# Patient Record
Sex: MALE | Race: WHITE | NOT HISPANIC OR LATINO | Employment: FULL TIME | URBAN - METROPOLITAN AREA
[De-identification: names, ages, dates, MRNs, and addresses within clinical notes are randomized per-mention and may not be internally consistent; named-entity substitution may affect disease eponyms.]

---

## 2017-01-20 RX ORDER — LISINOPRIL 40 MG/1
40 TABLET ORAL DAILY
Qty: 90 TABLET | Refills: 0 | Status: SHIPPED | OUTPATIENT
Start: 2017-01-20 | End: 2017-04-21 | Stop reason: SDUPTHER

## 2017-01-20 NOTE — TELEPHONE ENCOUNTER
Pharmacy called from next door saying pt need refill on Lisinopril told them MA would sent message to Giulia, pt is  Due this month for med check called pt left message no answer to call office he is due for med check.

## 2017-01-23 ENCOUNTER — OFFICE VISIT (OUTPATIENT)
Dept: FAMILY MEDICINE | Facility: CLINIC | Age: 52
End: 2017-01-23
Payer: COMMERCIAL

## 2017-01-23 VITALS
OXYGEN SATURATION: 97 % | HEART RATE: 87 BPM | WEIGHT: 229.25 LBS | BODY MASS INDEX: 31.05 KG/M2 | TEMPERATURE: 98 F | DIASTOLIC BLOOD PRESSURE: 64 MMHG | HEIGHT: 72 IN | SYSTOLIC BLOOD PRESSURE: 98 MMHG

## 2017-01-23 DIAGNOSIS — I10 ESSENTIAL HYPERTENSION: ICD-10-CM

## 2017-01-23 DIAGNOSIS — F90.0 ADHD (ATTENTION DEFICIT HYPERACTIVITY DISORDER), INATTENTIVE TYPE: Primary | ICD-10-CM

## 2017-01-23 PROCEDURE — 99213 OFFICE O/P EST LOW 20 MIN: CPT | Mod: S$GLB,,, | Performed by: NURSE PRACTITIONER

## 2017-01-23 PROCEDURE — 99999 PR PBB SHADOW E&M-EST. PATIENT-LVL IV: CPT | Mod: PBBFAC,,, | Performed by: NURSE PRACTITIONER

## 2017-01-23 RX ORDER — DEXTROAMPHETAMINE SULFATE, DEXTROAMPHETAMINE SACCHARATE, AMPHETAMINE SULFATE AND AMPHETAMINE ASPARTATE 7.5; 7.5; 7.5; 7.5 MG/1; MG/1; MG/1; MG/1
60 CAPSULE, EXTENDED RELEASE ORAL EVERY MORNING
Qty: 180 CAPSULE | Refills: 0 | Status: SHIPPED | OUTPATIENT
Start: 2017-01-23 | End: 2017-04-21 | Stop reason: SDUPTHER

## 2017-01-23 RX ORDER — TRIAMTERENE/HYDROCHLOROTHIAZID 37.5-25 MG
1 TABLET ORAL DAILY
Qty: 90 TABLET | Refills: 1 | Status: SHIPPED | OUTPATIENT
Start: 2017-01-23 | End: 2017-04-21 | Stop reason: SDUPTHER

## 2017-01-23 NOTE — PROGRESS NOTES
"Subjective:       Patient ID: Nick Mcdonough Sr. is a 51 y.o. male.    Chief Complaint: Medication Refill and Muscle Pain (both shoulders started about 2 weeks ago)    HPI Comments: Patient is here today for ADHD medication refill.  Patient is stable on current regimen.  Patient is able to focus and complete tasks effectively.  No side effects reported.    Patient has Hypertension that is controlled on present medication and actually much improved and on the lower end of normal.  BP 98/64  Pulse 87  Temp 97.6 °F (36.4 °C) (Oral)   Ht 5' 11.5" (1.816 m)  Wt 104 kg (229 lb 4.5 oz)  SpO2 97%  BMI 31.53 kg/m2  Patient states he lost weight and completely quit drinking alcohol. Lost 15 pounds in last 4 to 5 months.              Previous Medications    ADDERALL XR 30 MG 24 HR CAPSULE    Take 2 capsules (60 mg total) by mouth every morning.    AMLODIPINE (NORVASC) 10 MG TABLET    Take 1 tablet (10 mg total) by mouth once daily.    DOXAZOSIN (CARDURA) 8 MG TAB    Take 1 tablet (8 mg total) by mouth once daily.    ESOMEPRAZOLE (NEXIUM) 40 MG CAPSULE    Take 1 capsule (40 mg total) by mouth once daily.    FISH OIL-OMEGA-3 FATTY ACIDS 300-1,000 MG CAPSULE    Take 1 g by mouth once daily.    LISINOPRIL (PRINIVIL,ZESTRIL) 40 MG TABLET    Take 1 tablet (40 mg total) by mouth once daily.    NAPROXEN (NAPROSYN) 500 MG TABLET    Take 1 tablet (500 mg total) by mouth 2 (two) times daily with meals.    TRIAMTERENE-HYDROCHLOROTHIAZIDE 75-50 MG (MAXZIDE) 75-50 MG PER TABLET    Take 1 tablet by mouth once daily.       Past Medical History   Diagnosis Date    Adult ADHD     Alcohol dependency 9/2015     last alcohol intake September 2015    GERD (gastroesophageal reflux disease)     Hypertension        Past Surgical History   Procedure Laterality Date    Knee surgery Right     Vasectomy      Hernia repair      Inguinal hernia repair Left 2012     [facility] at [facility]       Family History   Problem Relation Age of Onset    " Hypertension Father     Cancer Maternal Grandmother     No Known Problems Maternal Grandfather     Heart disease Paternal Grandmother     Alcohol abuse Paternal Grandfather        Social History     Social History    Marital status:      Spouse name: N/A    Number of children: N/A    Years of education: N/A     Social History Main Topics    Smoking status: Current Every Day Smoker     Packs/day: 1.00     Years: 15.00    Smokeless tobacco: Never Used    Alcohol use No      Comment: socially    Drug use: No    Sexual activity: Not Asked     Other Topics Concern    None     Social History Narrative       Review of Systems   Constitutional: Negative for activity change, appetite change, fatigue, fever and unexpected weight change.   HENT: Negative for congestion, ear pain, mouth sores, nosebleeds, postnasal drip, rhinorrhea, sinus pressure, sneezing, sore throat, trouble swallowing and voice change.    Eyes: Negative.    Respiratory: Negative for cough, chest tightness and shortness of breath.    Cardiovascular: Negative for chest pain, palpitations and leg swelling.   Gastrointestinal: Negative.  Negative for abdominal pain, blood in stool, constipation, diarrhea, nausea and vomiting.   Endocrine: Negative.    Genitourinary: Negative for difficulty urinating, dysuria, flank pain, hematuria and urgency.   Musculoskeletal: Negative for arthralgias, back pain, gait problem, joint swelling, myalgias and neck pain.   Skin: Negative for color change, rash and wound.   Allergic/Immunologic: Negative for immunocompromised state.   Neurological: Negative for dizziness, tremors, seizures, syncope, speech difficulty and headaches.   Hematological: Negative for adenopathy. Does not bruise/bleed easily.   Psychiatric/Behavioral: Negative for behavioral problems, dysphoric mood, sleep disturbance and suicidal ideas. The patient is not nervous/anxious.          Objective:     Vitals:    01/23/17 1526 01/23/17 1550  "  BP: 100/70 98/64   BP Location: Left arm    Patient Position: Sitting    BP Method: Manual    Pulse: 87    Temp: 97.6 °F (36.4 °C)    TempSrc: Oral    SpO2: 97%    Weight: 104 kg (229 lb 4.5 oz)    Height: 5' 11.5" (1.816 m)           Physical Exam   Constitutional: He is oriented to person, place, and time. He appears well-developed and well-nourished. No distress.   + obesity with Body mass index is 31.53 kg/(m^2).       HENT:   Head: Normocephalic.   Right Ear: External ear normal.   Left Ear: External ear normal.   Nose: Nose normal.   Mouth/Throat: Oropharynx is clear and moist. No oropharyngeal exudate.   Eyes: EOM are normal. Pupils are equal, round, and reactive to light. Right eye exhibits no discharge. Left eye exhibits no discharge. No scleral icterus.   Neck: Normal range of motion. Neck supple. No JVD present. No tracheal deviation present. No thyromegaly present.   Cardiovascular: Normal rate, regular rhythm and normal heart sounds.    No murmur heard.  Pulmonary/Chest: Effort normal and breath sounds normal. No stridor. No respiratory distress.   Abdominal: Soft. He exhibits no distension and no mass. There is no guarding.   Musculoskeletal: Normal range of motion. He exhibits no edema.   Lymphadenopathy:     He has no cervical adenopathy.   Neurological: He is alert and oriented to person, place, and time. Coordination normal.   Skin: Skin is warm and dry. No rash noted. He is not diaphoretic.   Psychiatric: He has a normal mood and affect. His behavior is normal.         Assessment:         ICD-10-CM ICD-9-CM   1. ADHD (attention deficit hyperactivity disorder), inattentive type F90.0 314.00   2. Essential hypertension I10 401.9       Plan:       ADHD (attention deficit hyperactivity disorder), inattentive type  -  Controlled on present medication.  Follow up in 6 months.  -     ADDERALL XR 30 mg 24 hr capsule; Take 2 capsules (60 mg total) by mouth every morning.  Dispense: 180 capsule; Refill: " 0    Essential hypertension  -  Well controlled and actually on the lower end of normal - will cut back Maxzide 50 mg to Maxzide 25 mg.  Continue the Lisinopril and Amlodipine at present doses.  If Blood pressure stable, follow up in 3 months.  If blood pressure running low at home - return to office to recheck.  -     triamterene-hydrochlorothiazide 37.5-25 mg (MAXZIDE-25) 37.5-25 mg per tablet; Take 1 tablet by mouth once daily.  Dispense: 90 tablet; Refill: 1    Return in about 3 months (around 4/23/2017).     Patient's Medications   New Prescriptions    TRIAMTERENE-HYDROCHLOROTHIAZIDE 37.5-25 MG (MAXZIDE-25) 37.5-25 MG PER TABLET    Take 1 tablet by mouth once daily.   Previous Medications    AMLODIPINE (NORVASC) 10 MG TABLET    Take 1 tablet (10 mg total) by mouth once daily.    DOXAZOSIN (CARDURA) 8 MG TAB    Take 1 tablet (8 mg total) by mouth once daily.    ESOMEPRAZOLE (NEXIUM) 40 MG CAPSULE    Take 1 capsule (40 mg total) by mouth once daily.    FISH OIL-OMEGA-3 FATTY ACIDS 300-1,000 MG CAPSULE    Take 1 g by mouth once daily.    LISINOPRIL (PRINIVIL,ZESTRIL) 40 MG TABLET    Take 1 tablet (40 mg total) by mouth once daily.    NAPROXEN (NAPROSYN) 500 MG TABLET    Take 1 tablet (500 mg total) by mouth 2 (two) times daily with meals.   Modified Medications    Modified Medication Previous Medication    ADDERALL XR 30 MG 24 HR CAPSULE ADDERALL XR 30 mg 24 hr capsule       Take 2 capsules (60 mg total) by mouth every morning.    Take 2 capsules (60 mg total) by mouth every morning.   Discontinued Medications    TRIAMTERENE-HYDROCHLOROTHIAZIDE 75-50 MG (MAXZIDE) 75-50 MG PER TABLET    Take 1 tablet by mouth once daily.

## 2017-01-23 NOTE — MR AVS SNAPSHOT
[address] [place] [address]  Phone: [phone]  Fax: [phone]                  Nick Mcdonough [place]   2017 3:20 PM   Office Visit    Description:  Male : 1965   Provider:  Giulia Solis NP   Department:  [facility]           Reason for Visit     Medication Refill     Muscle Pain           Diagnoses this Visit        Comments    ADHD (attention deficit hyperactivity disorder), inattentive type    -  Primary     Essential hypertension                To Do List           Goals (5 Years of Data)     None      Follow-Up and Disposition     Return in about 3 months (around 2017).       These Medications        Disp Refills Start End    triamterene-hydrochlorothiazide 37.5-25 mg (MAXZIDE-25) 37.5-25 mg per tablet 90 tablet 1 2017    Take 1 tablet by mouth once daily. - Oral    Pharmacy: [facility] [place] [address] Ph #: [phone]       ADDERALL XR 30 mg 24 hr capsule 180 capsule 0 2017     Take 2 capsules (60 mg total) by mouth every morning. - Oral    Pharmacy: [facility]  [place] [address] Ph #: [phone]         [facility]     [facility] Nurse Care Line -  Assistance  Registered nurses in the [facility] provide clinical advisement, health education, appointment booking, and other advisory services.  Call for this free service at [phone].             Medications           Message regarding Medications     Verify the changes and/or additions to your medication regime listed below are the same as discussed with your clinician today.  If any of these changes or additions are incorrect, please notify your healthcare provider.        START taking these NEW medications        Refills    triamterene-hydrochlorothiazide 37.5-25 mg (MAXZIDE-25) 37.5-25 mg per tablet 1    Sig: Take 1  "tablet by mouth once daily.    Class: Normal    Route: Oral      STOP taking these medications     triamterene-hydrochlorothiazide 75-50 mg (MAXZIDE) 75-50 mg per tablet Take 1 tablet by mouth once daily.           Verify that the below list of medications is an accurate representation of the medications you are currently taking.  If none reported, the list may be blank. If incorrect, please contact your healthcare provider. Carry this list with you in case of emergency.           Current Medications     ADDERALL XR 30 mg 24 hr capsule Take 2 capsules (60 mg total) by mouth every morning.    amlodipine (NORVASC) 10 MG tablet Take 1 tablet (10 mg total) by mouth once daily.    doxazosin (CARDURA) 8 MG Tab Take 1 tablet (8 mg total) by mouth once daily.    esomeprazole (NEXIUM) 40 MG capsule Take 1 capsule (40 mg total) by mouth once daily.    fish oil-omega-3 fatty acids 300-1,000 mg capsule Take 1 g by mouth once daily.    lisinopril (PRINIVIL,ZESTRIL) 40 MG tablet Take 1 tablet (40 mg total) by mouth once daily.    naproxen (NAPROSYN) 500 MG tablet Take 1 tablet (500 mg total) by mouth 2 (two) times daily with meals.    triamterene-hydrochlorothiazide 37.5-25 mg (MAXZIDE-25) 37.5-25 mg per tablet Take 1 tablet by mouth once daily.           Clinical Reference Information           Vital Signs - Last Recorded  Most recent update: 1/23/2017  3:50 PM by Giulia Solis NP    BP Pulse Temp Ht Wt SpO2    98/64 87 97.6 °F (36.4 °C) (Oral) 5' 11.5" (1.816 m) 104 kg (229 lb 4.5 oz) 97%    BMI                31.53 kg/m2          Blood Pressure          Most Recent Value    BP  98/64      Allergies as of 1/23/2017     Pcn [Penicillins]      Immunizations Administered on Date of Encounter - 1/23/2017     None      Smoking Cessation     If you would like to quit smoking:   You may be eligible for free services if you are a [place] resident and started smoking cigarettes before September 1, 1988.  Call the Smoking Cessation " Trust ([facility]) toll free at ([phone] or [phone] [phone].   Call 5-800-QUIT-NOW if you do not meet the above criteria.

## 2017-01-30 ENCOUNTER — TELEPHONE (OUTPATIENT)
Dept: FAMILY MEDICINE | Facility: CLINIC | Age: 52
End: 2017-01-30

## 2017-01-30 NOTE — TELEPHONE ENCOUNTER
Spoke with pt inform him of paper work that Giulia had to fill out and MARVIN Erickson faxed it back and as soon as we hear something i will let him know, pt understood.

## 2017-01-30 NOTE — TELEPHONE ENCOUNTER
----- Message from Tiff Hurd sent at 1/30/2017  1:02 PM CST -----  Contact: self/[phone]  Patient is waiting on prior authorization for ADDERALL XR 30 mg 24 hr capsule.  Patient says he is all of the medication. Please advise

## 2017-02-01 ENCOUNTER — TELEPHONE (OUTPATIENT)
Dept: FAMILY MEDICINE | Facility: CLINIC | Age: 52
End: 2017-02-01

## 2017-04-21 ENCOUNTER — OFFICE VISIT (OUTPATIENT)
Dept: FAMILY MEDICINE | Facility: CLINIC | Age: 52
End: 2017-04-21
Payer: COMMERCIAL

## 2017-04-21 ENCOUNTER — TELEPHONE (OUTPATIENT)
Dept: FAMILY MEDICINE | Facility: CLINIC | Age: 52
End: 2017-04-21

## 2017-04-21 VITALS
BODY MASS INDEX: 35.53 KG/M2 | TEMPERATURE: 98 F | SYSTOLIC BLOOD PRESSURE: 130 MMHG | HEART RATE: 90 BPM | OXYGEN SATURATION: 97 % | DIASTOLIC BLOOD PRESSURE: 78 MMHG | HEIGHT: 69 IN | WEIGHT: 239.88 LBS

## 2017-04-21 DIAGNOSIS — F90.0 ADHD (ATTENTION DEFICIT HYPERACTIVITY DISORDER), INATTENTIVE TYPE: Primary | ICD-10-CM

## 2017-04-21 DIAGNOSIS — K21.9 GASTROESOPHAGEAL REFLUX DISEASE, ESOPHAGITIS PRESENCE NOT SPECIFIED: ICD-10-CM

## 2017-04-21 DIAGNOSIS — I10 ESSENTIAL HYPERTENSION: ICD-10-CM

## 2017-04-21 LAB
AMPHET+METHAMPHET UR QL: NORMAL
BARBITURATES UR QL SCN>200 NG/ML: NEGATIVE
BENZODIAZ UR QL SCN>200 NG/ML: NEGATIVE
BZE UR QL SCN: NEGATIVE
CANNABINOIDS UR QL SCN: NEGATIVE
CREAT UR-MCNC: 146 MG/DL
ETHANOL UR-MCNC: <10 MG/DL
METHADONE UR QL SCN>300 NG/ML: NEGATIVE
OPIATES UR QL SCN: NEGATIVE
PCP UR QL SCN>25 NG/ML: NEGATIVE
TOXICOLOGY INFORMATION: NORMAL

## 2017-04-21 PROCEDURE — 80307 DRUG TEST PRSMV CHEM ANLYZR: CPT

## 2017-04-21 PROCEDURE — 99999 PR PBB SHADOW E&M-EST. PATIENT-LVL III: CPT | Mod: PBBFAC,,, | Performed by: NURSE PRACTITIONER

## 2017-04-21 PROCEDURE — 99214 OFFICE O/P EST MOD 30 MIN: CPT | Mod: S$GLB,,, | Performed by: NURSE PRACTITIONER

## 2017-04-21 PROCEDURE — 1160F RVW MEDS BY RX/DR IN RCRD: CPT | Mod: S$GLB,,, | Performed by: NURSE PRACTITIONER

## 2017-04-21 PROCEDURE — 3078F DIAST BP <80 MM HG: CPT | Mod: S$GLB,,, | Performed by: NURSE PRACTITIONER

## 2017-04-21 PROCEDURE — 3075F SYST BP GE 130 - 139MM HG: CPT | Mod: S$GLB,,, | Performed by: NURSE PRACTITIONER

## 2017-04-21 RX ORDER — AMLODIPINE BESYLATE 10 MG/1
10 TABLET ORAL DAILY
Qty: 90 TABLET | Refills: 1 | Status: SHIPPED | OUTPATIENT
Start: 2017-04-21 | End: 2017-11-17 | Stop reason: SDUPTHER

## 2017-04-21 RX ORDER — DEXTROAMPHETAMINE SULFATE, DEXTROAMPHETAMINE SACCHARATE, AMPHETAMINE SULFATE AND AMPHETAMINE ASPARTATE 7.5; 7.5; 7.5; 7.5 MG/1; MG/1; MG/1; MG/1
60 CAPSULE, EXTENDED RELEASE ORAL EVERY MORNING
Qty: 180 CAPSULE | Refills: 0 | Status: SHIPPED | OUTPATIENT
Start: 2017-04-21 | End: 2017-06-16 | Stop reason: SDUPTHER

## 2017-04-21 RX ORDER — DOXAZOSIN 8 MG/1
8 TABLET ORAL DAILY
Qty: 90 TABLET | Refills: 1 | Status: SHIPPED | OUTPATIENT
Start: 2017-04-21 | End: 2018-05-30 | Stop reason: SDUPTHER

## 2017-04-21 RX ORDER — ESOMEPRAZOLE MAGNESIUM 40 MG/1
40 CAPSULE, DELAYED RELEASE ORAL DAILY
Qty: 90 CAPSULE | Refills: 1 | Status: SHIPPED | OUTPATIENT
Start: 2017-04-21 | End: 2018-02-12 | Stop reason: SDUPTHER

## 2017-04-21 RX ORDER — LISINOPRIL 40 MG/1
40 TABLET ORAL DAILY
Qty: 90 TABLET | Refills: 1 | Status: SHIPPED | OUTPATIENT
Start: 2017-04-21 | End: 2017-11-17 | Stop reason: SDUPTHER

## 2017-04-21 RX ORDER — TRIAMTERENE/HYDROCHLOROTHIAZID 37.5-25 MG
1 TABLET ORAL DAILY
Qty: 90 TABLET | Refills: 1 | Status: SHIPPED | OUTPATIENT
Start: 2017-04-21 | End: 2017-09-26 | Stop reason: SDUPTHER

## 2017-04-21 NOTE — MR AVS SNAPSHOT
[facility]  [address] [place] [address]  Phone: [phone]  Fax: [phone]                  Nick Mcdonough    2017 4:00 PM   Office Visit    Description:  Male : 1965   Provider:  Giulia Solis NP   Department:  [facility]           Reason for Visit     Medication Refill           Diagnoses this Visit        Comments    ADHD (attention deficit hyperactivity disorder), inattentive type    -  Primary     Gastroesophageal reflux disease, esophagitis presence not specified         Essential hypertension                To Do List           Goals (5 Years of Data)     None      Follow-Up and Disposition     Return in about 3 months (around 2017).       These Medications        Disp Refills Start End    ADDERALL XR 30 mg 24 hr capsule 180 capsule 0 2017     Take 2 capsules (60 mg total) by mouth every morning. - Oral    Pharmacy: [facility] [place] [address] AT [facility] [address] Ph #: [phone]       amlodipine (NORVASC) 10 MG tablet 90 tablet 1 2017     Take 1 tablet (10 mg total) by mouth once daily. - Oral    Pharmacy: [facility] [place] [address] AT [facility] [address] Ph #: [phone]       doxazosin (CARDURA) 8 MG Tab 90 tablet 1 2017     Take 1 tablet (8 mg total) by mouth once daily. - Oral    Pharmacy: [facility] [place] [address] AT [facility] [address] Ph #: [phone]       esomeprazole (NEXIUM) 40 MG capsule 90 capsule 1 2017     Take 1 capsule (40 mg total) by mouth once daily. - Oral    Pharmacy: [facility] [place] [address] AT [facility] [address] Ph #: [phone]       lisinopril (PRINIVIL,ZESTRIL) 40 MG tablet 90 tablet 1 2017     Take 1 tablet (40 mg total) by mouth once daily. - Oral    Pharmacy: [facility]  [id number]  [place], [place] - [address] AT [facility] [address] Ph #: [phone]       triamterene-hydrochlorothiazide 37.5-25 mg (MAXZIDE-25) 37.5-25 mg per tablet 90 tablet 1 4/21/2017 4/21/2018    Take 1 tablet by mouth once daily. - Oral    [facility] [id number]  [place], [place] [facility] [address] [facility] [address] [facility] [address] Ph #: [phone]         [facility]     [facility] On Call Nurse Care Line - 24/7 Assistance  Unless otherwise directed by your provider, please contact [facility], our nurse care line that is available for 24/7 assistance.     Registered nurses in the [facility] provide: appointment scheduling, clinical advisement, health education, and other advisory services.  Call: [phone] (toll free)               Medications           Message regarding Medications     Verify the changes and/or additions to your medication regime listed below are the same as discussed with your clinician today.  If any of these changes or additions are incorrect, please notify your healthcare provider.             Verify that the below list of medications is an accurate representation of the medications you are currently taking.  If none reported, the list may be blank. If incorrect, please contact your healthcare provider. Carry this list with you in case of emergency.           Current Medications     ADDERALL XR 30 mg 24 hr capsule Take 2 capsules (60 mg total) by mouth every morning.    amlodipine (NORVASC) 10 MG tablet Take 1 tablet (10 mg total) by mouth once daily.    doxazosin (CARDURA) 8 MG Tab Take 1 tablet (8 mg total) by mouth once daily.    esomeprazole (NEXIUM) 40 MG capsule Take 1 capsule (40 mg total) by mouth once daily.    fish oil-omega-3 fatty acids 300-1,000 mg capsule Take 1 g by mouth once daily.    lisinopril (PRINIVIL,ZESTRIL) 40 MG tablet Take 1 tablet (40 mg total) by mouth once daily.    naproxen (NAPROSYN) 500 MG tablet Take 1 tablet  "(500 mg total) by mouth 2 (two) times daily with meals.    triamterene-hydrochlorothiazide 37.5-25 mg (MAXZIDE-25) 37.5-25 mg per tablet Take 1 tablet by mouth once daily.           Clinical Reference Information           Your Vitals Were     BP Pulse Temp Height    130/78 (BP Location: Right arm, Patient Position: Sitting, BP Method: Manual) 90 98.3 °F (36.8 °C) (Oral) 5' 9.25" (1.759 m)    Weight SpO2 BMI    108.8 kg (239 lb 13.8 oz) 97% 35.17 kg/m2      Blood Pressure          Most Recent Value    BP  130/78      Allergies as of 4/21/2017     Pcn [Penicillins]      Immunizations Administered on Date of Encounter - 4/21/2017     None      Smoking Cessation     If you would like to quit smoking:   You may be eligible for free services if you are a [place] resident and started smoking cigarettes before September 1, 1988.  Call the Smoking Cessation Trust ([facility]) toll free at ([phone] [phone] or [phone] [phone].   Call 1-800-QUIT-NOW if you do not meet the above criteria.   Contact us via email: [email or web address]   View our website for more information: [email or web address]        Language Assistance Services     ATTENTION: Language assistance services are available, free of charge. Please call [phone].      ATENCIÓN: Si habla español, tiene a mclean disposición servicios gratuitos de asistencia lingüística. Llame al [phone].     CHÚ Ý: N?u b?n nói Ti?ng Vi?t, có các d?ch v? h? tr? ngôn ng? mi?n phí dành cho b?n. G?i s? [phone].         [facility] complies with applicable Federal civil rights laws and does not discriminate on the basis of race, color, national origin, age, disability, or sex.        "

## 2017-04-21 NOTE — PROGRESS NOTES
"Subjective:       Patient ID: Nick Mcdonough Sr. is a 51 y.o. male.    Chief Complaint: Medication Refill    HPI Comments: Patient is here today for 3 month follow up.    Patient is here today for ADHD medication refill. Patient is stable on current regimen. Patient is able to focus and complete tasks effectively. No side effects reported.    Patient has Hypertension that is controlled on current medications.  /78 (BP Location: Right arm, Patient Position: Sitting, BP Method: Manual)  Pulse 90  Temp 98.3 °F (36.8 °C) (Oral)   Ht 5' 9.25" (1.759 m)  Wt 108.8 kg (239 lb 13.8 oz)  SpO2 97%  BMI 35.17 kg/m2        Previous Medications    ADDERALL XR 30 MG 24 HR CAPSULE    Take 2 capsules (60 mg total) by mouth every morning.    AMLODIPINE (NORVASC) 10 MG TABLET    Take 1 tablet (10 mg total) by mouth once daily.    DOXAZOSIN (CARDURA) 8 MG TAB    Take 1 tablet (8 mg total) by mouth once daily.    ESOMEPRAZOLE (NEXIUM) 40 MG CAPSULE    Take 1 capsule (40 mg total) by mouth once daily.    FISH OIL-OMEGA-3 FATTY ACIDS 300-1,000 MG CAPSULE    Take 1 g by mouth once daily.    LISINOPRIL (PRINIVIL,ZESTRIL) 40 MG TABLET    Take 1 tablet (40 mg total) by mouth once daily.    NAPROXEN (NAPROSYN) 500 MG TABLET    Take 1 tablet (500 mg total) by mouth 2 (two) times daily with meals.    TRIAMTERENE-HYDROCHLOROTHIAZIDE 37.5-25 MG (MAXZIDE-25) 37.5-25 MG PER TABLET    Take 1 tablet by mouth once daily.       Past Medical History:   Diagnosis Date    Adult ADHD     Alcohol dependency 9/2015    last alcohol intake September 2015    GERD (gastroesophageal reflux disease)     Hypertension        Past Surgical History:   Procedure Laterality Date    HERNIA REPAIR      INGUINAL HERNIA REPAIR Left 2012    [facility] at [facility]    KNEE SURGERY Right     VASECTOMY         Family History   Problem Relation Age of Onset    Hypertension Father     Cancer Maternal Grandmother     No Known Problems Maternal Grandfather     " Heart disease Paternal Grandmother     Alcohol abuse Paternal Grandfather        Social History     Social History    Marital status:      Spouse name: N/A    Number of children: N/A    Years of education: N/A     Social History Main Topics    Smoking status: Current Every Day Smoker     Packs/day: 1.00     Years: 15.00    Smokeless tobacco: Never Used    Alcohol use No      Comment: socially    Drug use: No    Sexual activity: Not Asked     Other Topics Concern    None     Social History Narrative       Review of Systems   Constitutional: Negative for activity change, appetite change, fatigue, fever and unexpected weight change.   HENT: Negative for congestion, ear pain, mouth sores, nosebleeds, postnasal drip, rhinorrhea, sinus pressure, sneezing, sore throat, trouble swallowing and voice change.    Eyes: Negative.    Respiratory: Negative for cough, chest tightness and shortness of breath.    Cardiovascular: Negative for chest pain, palpitations and leg swelling.   Gastrointestinal: Negative.  Negative for abdominal pain, blood in stool, constipation, diarrhea, nausea and vomiting.   Endocrine: Negative.    Genitourinary: Negative for difficulty urinating, dysuria, flank pain, hematuria and urgency.   Musculoskeletal: Negative for arthralgias, back pain, gait problem, joint swelling, myalgias and neck pain.   Skin: Negative for color change, rash and wound.   Allergic/Immunologic: Negative for immunocompromised state.   Neurological: Negative for dizziness, tremors, seizures, syncope, speech difficulty and headaches.   Hematological: Negative for adenopathy. Does not bruise/bleed easily.   Psychiatric/Behavioral: Negative for behavioral problems, dysphoric mood, sleep disturbance and suicidal ideas. The patient is not nervous/anxious.          Objective:     Vitals:    04/21/17 1541   BP: 130/78   BP Location: Right arm   Patient Position: Sitting   BP Method: Manual   Pulse: 90   Temp: 98.3 °F  "(36.8 °C)   TempSrc: Oral   SpO2: 97%   Weight: 108.8 kg (239 lb 13.8 oz)   Height: 5' 9.25" (1.759 m)          Physical Exam   Constitutional: He is oriented to person, place, and time. He appears well-developed and well-nourished. No distress.   + obesity with Body mass index is 35.17 kg/(m^2).         HENT:   Head: Normocephalic.   Right Ear: External ear normal.   Left Ear: External ear normal.   Nose: Nose normal.   Mouth/Throat: Oropharynx is clear and moist. No oropharyngeal exudate.   Eyes: EOM are normal. Pupils are equal, round, and reactive to light. Right eye exhibits no discharge. Left eye exhibits no discharge. No scleral icterus.   Neck: Normal range of motion. Neck supple. No JVD present. No tracheal deviation present. No thyromegaly present.   Cardiovascular: Normal rate, regular rhythm and normal heart sounds.    No murmur heard.  Pulmonary/Chest: Effort normal and breath sounds normal. No stridor. No respiratory distress.   Abdominal: Soft. He exhibits no distension and no mass. There is no guarding.   Musculoskeletal: Normal range of motion. He exhibits no edema.   Lymphadenopathy:     He has no cervical adenopathy.   Neurological: He is alert and oriented to person, place, and time. Coordination normal.   Skin: Skin is warm and dry. No rash noted. He is not diaphoretic.   Psychiatric: He has a normal mood and affect. His behavior is normal.         Assessment:         ICD-10-CM ICD-9-CM   1. ADHD (attention deficit hyperactivity disorder), inattentive type F90.0 314.00   2. Gastroesophageal reflux disease, esophagitis presence not specified K21.9 530.81   3. Essential hypertension I10 401.9       Plan:       ADHD (attention deficit hyperactivity disorder), inattentive type  -   Controlled on present medications.  -  Random drug screen done today  -     ADDERALL XR 30 mg 24 hr capsule; Take 2 capsules (60 mg total) by mouth every morning.  Dispense: 180 capsule; Refill: 0    Gastroesophageal " reflux disease, esophagitis presence not specified  - Controlled on present medication  -     esomeprazole (NEXIUM) 40 MG capsule; Take 1 capsule (40 mg total) by mouth once daily.  Dispense: 90 capsule; Refill: 1    Essential hypertension  -  Controlled on present medication -follow up in October for wellness  -     triamterene-hydrochlorothiazide 37.5-25 mg (MAXZIDE-25) 37.5-25 mg per tablet; Take 1 tablet by mouth once daily.  Dispense: 90 tablet; Refill: 1    Other orders  -     amlodipine (NORVASC) 10 MG tablet; Take 1 tablet (10 mg total) by mouth once daily.  Dispense: 90 tablet; Refill: 1  -     doxazosin (CARDURA) 8 MG Tab; Take 1 tablet (8 mg total) by mouth once daily.  Dispense: 90 tablet; Refill: 1  -     lisinopril (PRINIVIL,ZESTRIL) 40 MG tablet; Take 1 tablet (40 mg total) by mouth once daily.  Dispense: 90 tablet; Refill: 1    Return in about 3 months (around 7/21/2017). for ADHD check    Patient's Medications   New Prescriptions    No medications on file   Previous Medications    FISH OIL-OMEGA-3 FATTY ACIDS 300-1,000 MG CAPSULE    Take 1 g by mouth once daily.    NAPROXEN (NAPROSYN) 500 MG TABLET    Take 1 tablet (500 mg total) by mouth 2 (two) times daily with meals.   Modified Medications    Modified Medication Previous Medication    ADDERALL XR 30 MG 24 HR CAPSULE ADDERALL XR 30 mg 24 hr capsule       Take 2 capsules (60 mg total) by mouth every morning.    Take 2 capsules (60 mg total) by mouth every morning.    AMLODIPINE (NORVASC) 10 MG TABLET amlodipine (NORVASC) 10 MG tablet       Take 1 tablet (10 mg total) by mouth once daily.    Take 1 tablet (10 mg total) by mouth once daily.    DOXAZOSIN (CARDURA) 8 MG TAB doxazosin (CARDURA) 8 MG Tab       Take 1 tablet (8 mg total) by mouth once daily.    Take 1 tablet (8 mg total) by mouth once daily.    ESOMEPRAZOLE (NEXIUM) 40 MG CAPSULE esomeprazole (NEXIUM) 40 MG capsule       Take 1 capsule (40 mg total) by mouth once daily.    Take 1 capsule  (40 mg total) by mouth once daily.    LISINOPRIL (PRINIVIL,ZESTRIL) 40 MG TABLET lisinopril (PRINIVIL,ZESTRIL) 40 MG tablet       Take 1 tablet (40 mg total) by mouth once daily.    Take 1 tablet (40 mg total) by mouth once daily.    TRIAMTERENE-HYDROCHLOROTHIAZIDE 37.5-25 MG (MAXZIDE-25) 37.5-25 MG PER TABLET triamterene-hydrochlorothiazide 37.5-25 mg (MAXZIDE-25) 37.5-25 mg per tablet       Take 1 tablet by mouth once daily.    Take 1 tablet by mouth once daily.   Discontinued Medications    No medications on file

## 2017-04-21 NOTE — TELEPHONE ENCOUNTER
----- Message from Carmen Reynolds sent at 4/21/2017 11:45 AM CDT -----  Contact: Self [phone]  Patient Returning Your Phone Call

## 2017-06-16 ENCOUNTER — OFFICE VISIT (OUTPATIENT)
Dept: FAMILY MEDICINE | Facility: CLINIC | Age: 52
End: 2017-06-16
Payer: COMMERCIAL

## 2017-06-16 VITALS
HEART RATE: 85 BPM | HEIGHT: 69 IN | DIASTOLIC BLOOD PRESSURE: 80 MMHG | BODY MASS INDEX: 35 KG/M2 | OXYGEN SATURATION: 97 % | TEMPERATURE: 98 F | WEIGHT: 236.31 LBS | SYSTOLIC BLOOD PRESSURE: 120 MMHG

## 2017-06-16 DIAGNOSIS — Z12.11 COLON CANCER SCREENING: ICD-10-CM

## 2017-06-16 DIAGNOSIS — F90.0 ADHD (ATTENTION DEFICIT HYPERACTIVITY DISORDER), INATTENTIVE TYPE: Primary | ICD-10-CM

## 2017-06-16 PROCEDURE — 99213 OFFICE O/P EST LOW 20 MIN: CPT | Mod: S$GLB,,, | Performed by: NURSE PRACTITIONER

## 2017-06-16 PROCEDURE — 99999 PR PBB SHADOW E&M-EST. PATIENT-LVL IV: CPT | Mod: PBBFAC,,, | Performed by: NURSE PRACTITIONER

## 2017-06-16 RX ORDER — DEXTROAMPHETAMINE SULFATE, DEXTROAMPHETAMINE SACCHARATE, AMPHETAMINE SULFATE AND AMPHETAMINE ASPARTATE 7.5; 7.5; 7.5; 7.5 MG/1; MG/1; MG/1; MG/1
60 CAPSULE, EXTENDED RELEASE ORAL EVERY MORNING
Qty: 180 CAPSULE | Refills: 0 | Status: SHIPPED | OUTPATIENT
Start: 2017-06-16 | End: 2017-09-22 | Stop reason: SDUPTHER

## 2017-06-16 NOTE — PROGRESS NOTES
Subjective:       Patient ID: Nick Mcdonough Sr. is a 51 y.o. male.    Chief Complaint: Medication Refill    Patient is here today with report he is here today for ADHD medication refill.    Patient currently takes Adderall XR 30 mg 2 capsules daily - I prescribe #180 - 3 month supply at once.  Patient reports no pharmacy around him carried that may Adderall XR so the pharmacy filled what they had available - quantity of 120 tablets - 2 month supply.   Works Monday - Friday 6:30 AM to 3 PM.  Works as a .                    Previous Medications    ADDERALL XR 30 MG 24 HR CAPSULE    Take 2 capsules (60 mg total) by mouth every morning.    AMLODIPINE (NORVASC) 10 MG TABLET    Take 1 tablet (10 mg total) by mouth once daily.    DOXAZOSIN (CARDURA) 8 MG TAB    Take 1 tablet (8 mg total) by mouth once daily.    ESOMEPRAZOLE (NEXIUM) 40 MG CAPSULE    Take 1 capsule (40 mg total) by mouth once daily.    FISH OIL-OMEGA-3 FATTY ACIDS 300-1,000 MG CAPSULE    Take 1 g by mouth once daily.    LISINOPRIL (PRINIVIL,ZESTRIL) 40 MG TABLET    Take 1 tablet (40 mg total) by mouth once daily.    NAPROXEN (NAPROSYN) 500 MG TABLET    Take 1 tablet (500 mg total) by mouth 2 (two) times daily with meals.    TRIAMTERENE-HYDROCHLOROTHIAZIDE 37.5-25 MG (MAXZIDE-25) 37.5-25 MG PER TABLET    Take 1 tablet by mouth once daily.       Past Medical History:   Diagnosis Date    Adult ADHD     Alcohol dependency 9/2015    last alcohol intake September 2015    GERD (gastroesophageal reflux disease)     Hypertension        Past Surgical History:   Procedure Laterality Date    HERNIA REPAIR      INGUINAL HERNIA REPAIR Left 2012    done at [facility]    KNEE SURGERY Right     VASECTOMY         Family History   Problem Relation Age of Onset    Hypertension Father     Cancer Maternal Grandmother     No Known Problems Maternal Grandfather     Heart disease Paternal Grandmother     Alcohol abuse Paternal Grandfather         Social History     Social History    Marital status:      Spouse name: N/A    Number of children: N/A    Years of education: N/A     Social History Main Topics    Smoking status: Current Every Day Smoker     Packs/day: 1.00     Years: 15.00    Smokeless tobacco: Never Used    Alcohol use No      Comment: socially    Drug use: No    Sexual activity: Not Asked     Other Topics Concern    None     Social History Narrative    None       Review of Systems   Constitutional: Negative for activity change, appetite change, fatigue, fever and unexpected weight change.   HENT: Negative for congestion, ear pain, mouth sores, nosebleeds, postnasal drip, rhinorrhea, sinus pressure, sneezing, sore throat, trouble swallowing and voice change.    Eyes: Negative.  Negative for discharge.   Respiratory: Negative for cough, chest tightness, shortness of breath and wheezing.    Cardiovascular: Negative for chest pain, palpitations and leg swelling.   Gastrointestinal: Negative.  Negative for abdominal pain, blood in stool, constipation, diarrhea, nausea and vomiting.   Endocrine: Negative.  Negative for polydipsia and polyuria.   Genitourinary: Negative for difficulty urinating, dysuria, flank pain, hematuria and urgency.   Musculoskeletal: Negative for arthralgias, back pain, gait problem, joint swelling, myalgias and neck pain.   Skin: Negative for color change, rash and wound.   Allergic/Immunologic: Negative for immunocompromised state.   Neurological: Negative for dizziness, tremors, seizures, syncope, speech difficulty, weakness and headaches.   Hematological: Negative for adenopathy. Does not bruise/bleed easily.   Psychiatric/Behavioral: Negative for behavioral problems, confusion, dysphoric mood, sleep disturbance and suicidal ideas. The patient is not nervous/anxious.          Objective:     Vitals:    06/16/17 1340   BP: 120/80   BP Location: Right arm   Patient Position: Sitting   BP Method: Manual  "  Pulse: 85   Temp: 97.8 °F (36.6 °C)   TempSrc: Oral   SpO2: 97%   Weight: 107.2 kg (236 lb 5.3 oz)   Height: 5' 9.25" (1.759 m)          Physical Exam   Constitutional: He is oriented to person, place, and time. He appears well-developed and well-nourished. No distress.   + obesity with Body mass index is 34.65 kg/m².   HENT:   Head: Normocephalic.   Right Ear: External ear normal.   Left Ear: External ear normal.   Nose: Nose normal.   Mouth/Throat: Oropharynx is clear and moist. No oropharyngeal exudate.   Eyes: EOM are normal. Pupils are equal, round, and reactive to light. Right eye exhibits no discharge. Left eye exhibits no discharge. No scleral icterus.   Neck: Normal range of motion. Neck supple. No JVD present. No tracheal deviation present. No thyromegaly present.   Cardiovascular: Normal rate, regular rhythm and normal heart sounds.    No murmur heard.  Pulmonary/Chest: Effort normal and breath sounds normal. No stridor. No respiratory distress.   Abdominal: Soft. He exhibits no distension and no mass. There is no guarding.   Musculoskeletal: Normal range of motion. He exhibits no edema.   Lymphadenopathy:     He has no cervical adenopathy.   Neurological: He is alert and oriented to person, place, and time. Coordination normal.   Skin: Skin is warm and dry. No rash noted. He is not diaphoretic.   Psychiatric: He has a normal mood and affect. His behavior is normal.         Assessment:         ICD-10-CM ICD-9-CM   1. ADHD (attention deficit hyperactivity disorder), inattentive type F90.0 314.00   2. Colon cancer screening Z12.11 V76.51       Plan:       ADHD (attention deficit hyperactivity disorder), inattentive type  -  Controlled on present medication - follow up in 3 months.  -     ADDERALL XR 30 mg 24 hr capsule; Take 2 capsules (60 mg total) by mouth every morning.  Dispense: 180 capsule; Refill: 0    Colon cancer screening  -     Case request GI: COLONOSCOPY      Return in about 3 months (around " 9/16/2017).     Patient's Medications   New Prescriptions    No medications on file   Previous Medications    AMLODIPINE (NORVASC) 10 MG TABLET    Take 1 tablet (10 mg total) by mouth once daily.    DOXAZOSIN (CARDURA) 8 MG TAB    Take 1 tablet (8 mg total) by mouth once daily.    ESOMEPRAZOLE (NEXIUM) 40 MG CAPSULE    Take 1 capsule (40 mg total) by mouth once daily.    FISH OIL-OMEGA-3 FATTY ACIDS 300-1,000 MG CAPSULE    Take 1 g by mouth once daily.    LISINOPRIL (PRINIVIL,ZESTRIL) 40 MG TABLET    Take 1 tablet (40 mg total) by mouth once daily.    TRIAMTERENE-HYDROCHLOROTHIAZIDE 37.5-25 MG (MAXZIDE-25) 37.5-25 MG PER TABLET    Take 1 tablet by mouth once daily.   Modified Medications    Modified Medication Previous Medication    ADDERALL XR 30 MG 24 HR CAPSULE ADDERALL XR 30 mg 24 hr capsule       Take 2 capsules (60 mg total) by mouth every morning.    Take 2 capsules (60 mg total) by mouth every morning.   Discontinued Medications    NAPROXEN (NAPROSYN) 500 MG TABLET    Take 1 tablet (500 mg total) by mouth 2 (two) times daily with meals.

## 2017-06-22 ENCOUNTER — TELEPHONE (OUTPATIENT)
Dept: GASTROENTEROLOGY | Facility: CLINIC | Age: 52
End: 2017-06-22

## 2017-06-22 NOTE — TELEPHONE ENCOUNTER
Referral was sent from Dr. Solis to schedule patient for an Colonoscopy Screening. Patient stated that he would like to call back at a later time in regards to getting scheduled due to other health problems.

## 2017-07-30 PROBLEM — E87.6 HYPOKALEMIA: Status: RESOLVED | Noted: 2017-07-30 | Resolved: 2017-07-30

## 2017-07-30 PROBLEM — Z72.0 TOBACCO ABUSE: Status: ACTIVE | Noted: 2017-07-30

## 2017-07-30 PROBLEM — Z77.098 CHLORINE GAS EXPOSURE: Status: ACTIVE | Noted: 2017-07-30

## 2017-07-30 PROBLEM — E87.6 HYPOKALEMIA: Status: ACTIVE | Noted: 2017-07-30

## 2017-07-30 PROBLEM — D72.829 LEUKOCYTOSIS: Status: ACTIVE | Noted: 2017-07-30

## 2017-09-22 ENCOUNTER — OFFICE VISIT (OUTPATIENT)
Dept: FAMILY MEDICINE | Facility: CLINIC | Age: 52
End: 2017-09-22
Payer: COMMERCIAL

## 2017-09-22 VITALS
TEMPERATURE: 98 F | DIASTOLIC BLOOD PRESSURE: 84 MMHG | HEART RATE: 93 BPM | OXYGEN SATURATION: 97 % | BODY MASS INDEX: 36.14 KG/M2 | SYSTOLIC BLOOD PRESSURE: 130 MMHG | WEIGHT: 252.44 LBS | HEIGHT: 70 IN

## 2017-09-22 DIAGNOSIS — Z13.0 SCREENING, ANEMIA, DEFICIENCY, IRON: ICD-10-CM

## 2017-09-22 DIAGNOSIS — Z12.11 COLON CANCER SCREENING: ICD-10-CM

## 2017-09-22 DIAGNOSIS — F90.0 ADHD (ATTENTION DEFICIT HYPERACTIVITY DISORDER), INATTENTIVE TYPE: Primary | ICD-10-CM

## 2017-09-22 DIAGNOSIS — Z12.5 PROSTATE CANCER SCREENING: ICD-10-CM

## 2017-09-22 DIAGNOSIS — Z13.1 DIABETES MELLITUS SCREENING: ICD-10-CM

## 2017-09-22 DIAGNOSIS — Z13.29 THYROID DISORDER SCREEN: ICD-10-CM

## 2017-09-22 DIAGNOSIS — Z13.220 SCREENING CHOLESTEROL LEVEL: ICD-10-CM

## 2017-09-22 DIAGNOSIS — Z23 FLU VACCINE NEED: ICD-10-CM

## 2017-09-22 PROCEDURE — 90686 IIV4 VACC NO PRSV 0.5 ML IM: CPT | Mod: S$GLB,,, | Performed by: NURSE PRACTITIONER

## 2017-09-22 PROCEDURE — 99213 OFFICE O/P EST LOW 20 MIN: CPT | Mod: 25,S$GLB,, | Performed by: NURSE PRACTITIONER

## 2017-09-22 PROCEDURE — 3079F DIAST BP 80-89 MM HG: CPT | Mod: S$GLB,,, | Performed by: NURSE PRACTITIONER

## 2017-09-22 PROCEDURE — 3008F BODY MASS INDEX DOCD: CPT | Mod: S$GLB,,, | Performed by: NURSE PRACTITIONER

## 2017-09-22 PROCEDURE — 99999 PR PBB SHADOW E&M-EST. PATIENT-LVL IV: CPT | Mod: PBBFAC,,, | Performed by: NURSE PRACTITIONER

## 2017-09-22 PROCEDURE — 3075F SYST BP GE 130 - 139MM HG: CPT | Mod: S$GLB,,, | Performed by: NURSE PRACTITIONER

## 2017-09-22 PROCEDURE — 90471 IMMUNIZATION ADMIN: CPT | Mod: S$GLB,,, | Performed by: NURSE PRACTITIONER

## 2017-09-22 RX ORDER — DEXTROAMPHETAMINE SULFATE, DEXTROAMPHETAMINE SACCHARATE, AMPHETAMINE SULFATE AND AMPHETAMINE ASPARTATE 7.5; 7.5; 7.5; 7.5 MG/1; MG/1; MG/1; MG/1
60 CAPSULE, EXTENDED RELEASE ORAL EVERY MORNING
Qty: 180 CAPSULE | Refills: 0 | Status: SHIPPED | OUTPATIENT
Start: 2017-09-22 | End: 2017-12-18 | Stop reason: SDUPTHER

## 2017-09-22 NOTE — PROGRESS NOTES
Subjective:       Patient ID: Nick Mcdonough Sr. is a 52 y.o. male.    Chief Complaint: Medication Refill    Patient is here today for ADHD medication refill. Patient is stable on current regimen. Patient is able to focus and complete tasks effectively. No side effects reported.    Patient is due for colon cancer screen.  Reports on shut down so does not have time for colonoscopy.  FITKIT ordered.  FitKit was given to patient on 9/22/2017 4:20 PM             Previous Medications    ADDERALL XR 30 MG 24 HR CAPSULE    Take 2 capsules (60 mg total) by mouth every morning.    AMLODIPINE (NORVASC) 10 MG TABLET    Take 1 tablet (10 mg total) by mouth once daily.    DOXAZOSIN (CARDURA) 8 MG TAB    Take 1 tablet (8 mg total) by mouth once daily.    ESOMEPRAZOLE (NEXIUM) 40 MG CAPSULE    Take 1 capsule (40 mg total) by mouth once daily.    FISH OIL-OMEGA-3 FATTY ACIDS 300-1,000 MG CAPSULE    Take 1 g by mouth once daily.    LISINOPRIL (PRINIVIL,ZESTRIL) 40 MG TABLET    Take 1 tablet (40 mg total) by mouth once daily.    TRIAMTERENE-HYDROCHLOROTHIAZIDE 37.5-25 MG (MAXZIDE-25) 37.5-25 MG PER TABLET    Take 1 tablet by mouth once daily.       Past Medical History:   Diagnosis Date    Adult ADHD     Alcohol dependency 9/2015    last alcohol intake September 2015    GERD (gastroesophageal reflux disease)     Hypertension        Past Surgical History:   Procedure Laterality Date    HERNIA REPAIR      INGUINAL HERNIA REPAIR Left 2012    [place]    KNEE SURGERY Right     VASECTOMY         Family History   Problem Relation Age of Onset    Hypertension Father     Cancer Maternal Grandmother     No Known Problems Maternal Grandfather     Heart disease Paternal Grandmother     Alcohol abuse Paternal Grandfather        Social History     Social History    Marital status:      Spouse name: N/A    Number of children: N/A    Years of education: N/A     Social History Main Topics    Smoking status: Current  "Every Day Smoker     Packs/day: 1.00     Years: 15.00    Smokeless tobacco: Never Used    Alcohol use No      Comment: socially    Drug use: No    Sexual activity: Not Asked     Other Topics Concern    None     Social History Narrative    None       Review of Systems   Constitutional: Negative for activity change and unexpected weight change.   HENT: Negative for hearing loss, rhinorrhea and trouble swallowing.    Eyes: Negative for discharge and visual disturbance.   Respiratory: Negative for chest tightness and wheezing.    Cardiovascular: Negative for chest pain and palpitations.   Gastrointestinal: Negative for blood in stool, constipation, diarrhea and vomiting.   Endocrine: Negative for polydipsia and polyuria.   Genitourinary: Negative for difficulty urinating, hematuria and urgency.   Musculoskeletal: Negative for arthralgias, joint swelling and neck pain.   Neurological: Negative for weakness and headaches.   Psychiatric/Behavioral: Negative for confusion and dysphoric mood.         Objective:     Vitals:    09/22/17 1520 09/22/17 1539   BP: (!) 140/90 130/84   BP Location: Right arm    Patient Position: Sitting    BP Method: Medium (Manual)    Pulse: 93    Temp: 98.4 °F (36.9 °C)    TempSrc: Oral    SpO2: 97%    Weight: 114.5 kg (252 lb 6.8 oz)    Height: 5' 10" (1.778 m)           Physical Exam   Constitutional: He is oriented to person, place, and time. He appears well-developed and well-nourished. No distress.   + obesity with Body mass index is 36.22 kg/m².     HENT:   Head: Normocephalic.   Right Ear: External ear normal.   Left Ear: External ear normal.   Nose: Nose normal.   Mouth/Throat: Oropharynx is clear and moist. No oropharyngeal exudate.   Eyes: EOM are normal. Pupils are equal, round, and reactive to light. Right eye exhibits no discharge. Left eye exhibits no discharge. No scleral icterus.   Neck: Normal range of motion. Neck supple. No JVD present. No tracheal deviation present. No " thyromegaly present.   Cardiovascular: Normal rate, regular rhythm and normal heart sounds.    No murmur heard.  Pulmonary/Chest: Effort normal and breath sounds normal. No stridor. No respiratory distress.   Abdominal: Soft. He exhibits no distension and no mass. There is no guarding.   Musculoskeletal: Normal range of motion. He exhibits no edema.   Lymphadenopathy:     He has no cervical adenopathy.   Neurological: He is alert and oriented to person, place, and time. Coordination normal.   Skin: Skin is warm and dry. No rash noted. He is not diaphoretic.   Psychiatric: He has a normal mood and affect. His behavior is normal.         Assessment:         ICD-10-CM ICD-9-CM   1. ADHD (attention deficit hyperactivity disorder), inattentive type F90.0 314.00   2. Colon cancer screening Z12.11 V76.51   3. Flu vaccine need Z23 V04.81   4. Screening cholesterol level Z13.220 V77.91   5. Prostate cancer screening Z12.5 V76.44   6. Thyroid disorder screen Z13.29 V77.0   7. Screening, anemia, deficiency, iron Z13.0 V78.0   8. Diabetes mellitus screening Z13.1 V77.1       Plan:       ADHD (attention deficit hyperactivity disorder), inattentive type  -  Follow up in 3 months.  -     ADDERALL XR 30 mg 24 hr capsule; Take 2 capsules (60 mg total) by mouth every morning.  Dispense: 180 capsule; Refill: 0    Colon cancer screening  -  Collect at home and turn in to [facility] lab.  -     Fecal Immunochemical Test (iFOBT); Future; Expected date: 09/22/2017    Flu vaccine need  -     Influenza - Quadrivalent (3 years & older) (PF)    Screening cholesterol level  -     Lipid panel; Future; Expected date: 12/04/2017    Prostate cancer screening  -     PSA, Screening; Future; Expected date: 12/04/2017    Thyroid disorder screen  -     TSH; Future; Expected date: 12/04/2017    Screening, anemia, deficiency, iron  -     CBC auto differential; Future; Expected date: 12/04/2017    Diabetes mellitus screening  -     Comprehensive metabolic  panel; Future; Expected date: 12/04/2017      Return in about 3 months (around 12/22/2017) for fasting labs and wellness exam.     Patient's Medications   New Prescriptions    No medications on file   Previous Medications    AMLODIPINE (NORVASC) 10 MG TABLET    Take 1 tablet (10 mg total) by mouth once daily.    DOXAZOSIN (CARDURA) 8 MG TAB    Take 1 tablet (8 mg total) by mouth once daily.    ESOMEPRAZOLE (NEXIUM) 40 MG CAPSULE    Take 1 capsule (40 mg total) by mouth once daily.    FISH OIL-OMEGA-3 FATTY ACIDS 300-1,000 MG CAPSULE    Take 1 g by mouth once daily.    LISINOPRIL (PRINIVIL,ZESTRIL) 40 MG TABLET    Take 1 tablet (40 mg total) by mouth once daily.    TRIAMTERENE-HYDROCHLOROTHIAZIDE 37.5-25 MG (MAXZIDE-25) 37.5-25 MG PER TABLET    Take 1 tablet by mouth once daily.   Modified Medications    Modified Medication Previous Medication    ADDERALL XR 30 MG 24 HR CAPSULE ADDERALL XR 30 mg 24 hr capsule       Take 2 capsules (60 mg total) by mouth every morning.    Take 2 capsules (60 mg total) by mouth every morning.   Discontinued Medications    No medications on file

## 2017-09-26 DIAGNOSIS — I10 ESSENTIAL HYPERTENSION: ICD-10-CM

## 2017-09-26 RX ORDER — TRIAMTERENE/HYDROCHLOROTHIAZID 37.5-25 MG
1 TABLET ORAL DAILY
Qty: 90 TABLET | Refills: 1 | Status: SHIPPED | OUTPATIENT
Start: 2017-09-26 | End: 2018-02-12 | Stop reason: SDUPTHER

## 2017-09-26 NOTE — TELEPHONE ENCOUNTER
Pt requesting refill on Maxzide return on 12/22/17 pt wants medication sent to [facility] in [place].

## 2017-11-17 ENCOUNTER — TELEPHONE (OUTPATIENT)
Dept: FAMILY MEDICINE | Facility: CLINIC | Age: 52
End: 2017-11-17

## 2017-11-17 ENCOUNTER — PATIENT MESSAGE (OUTPATIENT)
Dept: FAMILY MEDICINE | Facility: CLINIC | Age: 52
End: 2017-11-17

## 2017-11-17 RX ORDER — AMLODIPINE BESYLATE 10 MG/1
10 TABLET ORAL DAILY
Qty: 90 TABLET | Refills: 1 | Status: CANCELLED | OUTPATIENT
Start: 2017-11-17

## 2017-11-17 RX ORDER — LISINOPRIL 40 MG/1
TABLET ORAL
Qty: 90 TABLET | Refills: 0 | Status: SHIPPED | OUTPATIENT
Start: 2017-11-17 | End: 2018-05-30 | Stop reason: SDUPTHER

## 2017-11-17 RX ORDER — AMLODIPINE BESYLATE 10 MG/1
10 TABLET ORAL DAILY
Qty: 90 TABLET | Refills: 1 | Status: SHIPPED | OUTPATIENT
Start: 2017-11-17 | End: 2018-05-30 | Stop reason: SDUPTHER

## 2017-11-17 RX ORDER — AMLODIPINE BESYLATE 10 MG/1
TABLET ORAL
Qty: 90 TABLET | Refills: 1 | OUTPATIENT
Start: 2017-11-17

## 2017-11-17 NOTE — TELEPHONE ENCOUNTER
----- Message from Kristen Shahid sent at 11/17/2017  3:16 PM CST -----  Contact: Self/ [phone]  Patient would like a refill for his blood pressure medication he will pick it up at [facility] today. Please advise.

## 2017-11-17 NOTE — TELEPHONE ENCOUNTER
----- Message from Delvin Mas sent at 11/17/2017  1:53 PM CST -----  Contact: [phone]/self   Refill request for  amlodipine (NORVASC) 10 MG tablet.   Pt would like to prescription sent to [facility]

## 2017-12-15 ENCOUNTER — PATIENT MESSAGE (OUTPATIENT)
Dept: ADMINISTRATIVE | Facility: HOSPITAL | Age: 52
End: 2017-12-15

## 2017-12-16 ENCOUNTER — OFFICE VISIT (OUTPATIENT)
Dept: URGENT CARE | Facility: CLINIC | Age: 52
End: 2017-12-16
Payer: COMMERCIAL

## 2017-12-16 VITALS
DIASTOLIC BLOOD PRESSURE: 80 MMHG | HEART RATE: 78 BPM | SYSTOLIC BLOOD PRESSURE: 130 MMHG | OXYGEN SATURATION: 98 % | TEMPERATURE: 96 F | WEIGHT: 252 LBS | HEIGHT: 70 IN | RESPIRATION RATE: 18 BRPM | BODY MASS INDEX: 36.08 KG/M2

## 2017-12-16 DIAGNOSIS — S80.812A INFECTED ABRASION OF LEFT LOWER EXTREMITY, INITIAL ENCOUNTER: ICD-10-CM

## 2017-12-16 DIAGNOSIS — S89.92XA LEFT LEG INJURY, INITIAL ENCOUNTER: Primary | ICD-10-CM

## 2017-12-16 DIAGNOSIS — L08.9 INFECTED ABRASION OF LEFT LOWER EXTREMITY, INITIAL ENCOUNTER: ICD-10-CM

## 2017-12-16 DIAGNOSIS — S80.12XA CONTUSION OF MULTIPLE SITES OF LEFT LOWER EXTREMITY, INITIAL ENCOUNTER: ICD-10-CM

## 2017-12-16 PROCEDURE — 99203 OFFICE O/P NEW LOW 30 MIN: CPT | Mod: S$GLB,,, | Performed by: PHYSICIAN ASSISTANT

## 2017-12-16 RX ORDER — SULFAMETHOXAZOLE AND TRIMETHOPRIM 800; 160 MG/1; MG/1
1 TABLET ORAL 2 TIMES DAILY
Qty: 20 TABLET | Refills: 0 | Status: SHIPPED | OUTPATIENT
Start: 2017-12-16 | End: 2017-12-26

## 2017-12-16 RX ORDER — SULFAMETHOXAZOLE AND TRIMETHOPRIM 800; 160 MG/1; MG/1
1 TABLET ORAL 2 TIMES DAILY
Qty: 20 TABLET | Refills: 0 | Status: SHIPPED | OUTPATIENT
Start: 2017-12-16 | End: 2017-12-16 | Stop reason: SDUPTHER

## 2017-12-16 NOTE — PROGRESS NOTES
"Subjective:       Patient ID: Nick Mcdonough Sr. is a 52 y.o. male.    Vitals:  height is 5' 10" (1.778 m) and weight is 114.3 kg (252 lb). His temperature is 96.4 °F (35.8 °C). His blood pressure is 130/80 and his pulse is 78. His respiration is 18 and oxygen saturation is 98%.     Chief Complaint: Leg Pain    Leg Pain    The incident occurred 5 to 7 days ago. The incident occurred at home. The injury mechanism was a fall. The pain is present in the left leg. The quality of the pain is described as aching and cramping. The pain is at a severity of 9/10. The pain has been constant since onset. Pertinent negatives include no numbness. He reports no foreign bodies present. The symptoms are aggravated by movement and weight bearing. He has tried acetaminophen and rest for the symptoms. The treatment provided no relief.     Review of Systems   Constitution: Negative for weakness and malaise/fatigue.   HENT: Negative for nosebleeds.    Cardiovascular: Negative for chest pain and syncope.   Respiratory: Negative for shortness of breath.    Musculoskeletal: Positive for joint pain, muscle cramps and stiffness. Negative for back pain and neck pain.   Gastrointestinal: Negative for abdominal pain.   Genitourinary: Negative for hematuria.   Neurological: Negative for dizziness and numbness.       Objective:      Physical Exam   Constitutional: He is oriented to person, place, and time. He appears well-developed and well-nourished. He is cooperative.  Non-toxic appearance. He does not appear ill. No distress.   HENT:   Head: Normocephalic and atraumatic. Head is without abrasion, without contusion and without laceration.   Right Ear: Hearing, tympanic membrane, external ear and ear canal normal. No hemotympanum.   Left Ear: Hearing, tympanic membrane, external ear and ear canal normal. No hemotympanum.   Nose: Nose normal. No mucosal edema, rhinorrhea or nasal deformity. No epistaxis. Right sinus exhibits no maxillary sinus " tenderness and no frontal sinus tenderness. Left sinus exhibits no maxillary sinus tenderness and no frontal sinus tenderness.   Mouth/Throat: Uvula is midline, oropharynx is clear and moist and mucous membranes are normal. No trismus in the jaw. Normal dentition. No uvula swelling. No posterior oropharyngeal erythema.   Eyes: Conjunctivae, EOM and lids are normal. Pupils are equal, round, and reactive to light. Right eye exhibits no discharge. Left eye exhibits no discharge. No scleral icterus.   Sclera clear bilat   Neck: Trachea normal, normal range of motion, full passive range of motion without pain and phonation normal. Neck supple. No spinous process tenderness and no muscular tenderness present. No neck rigidity. No tracheal deviation present.   Cardiovascular: Normal rate, regular rhythm, normal heart sounds, intact distal pulses and normal pulses.    Pulmonary/Chest: Effort normal and breath sounds normal. No respiratory distress.   Abdominal: Soft. Normal appearance and bowel sounds are normal. He exhibits no distension, no pulsatile midline mass and no mass. There is no tenderness.   Musculoskeletal: Normal range of motion. He exhibits no deformity.        Left knee: He exhibits swelling, effusion, ecchymosis and bony tenderness. He exhibits normal range of motion, no deformity, no laceration, no erythema, normal alignment, no LCL laxity, normal patellar mobility, normal meniscus and no MCL laxity. Tenderness found. Medial joint line tenderness noted. No lateral joint line, no MCL, no LCL and no patellar tendon tenderness noted.        Left ankle: He exhibits swelling and ecchymosis. He exhibits normal range of motion, no deformity, no laceration and normal pulse. No tenderness. Achilles tendon normal.        Left lower leg: He exhibits swelling and edema. He exhibits no tenderness, no bony tenderness, no deformity and no laceration.        Legs:       Left foot: There is normal range of motion, no  tenderness, no bony tenderness, no swelling, normal capillary refill, no crepitus, no deformity and no laceration.   FULL ROM B LE AND ABLE TO BEAR WEIGHT AND AMBULATE WITH SMOOTH RHYTHMIC GAIT   Neurological: He is alert and oriented to person, place, and time. He has normal strength. No cranial nerve deficit or sensory deficit. He exhibits normal muscle tone. He displays no seizure activity. Coordination normal. GCS eye subscore is 4. GCS verbal subscore is 5. GCS motor subscore is 6.   Skin: Skin is warm, dry and intact. Capillary refill takes less than 2 seconds. No abrasion, no bruising, no burn, no ecchymosis and no laceration noted. He is not diaphoretic. No pallor.   Psychiatric: He has a normal mood and affect. His speech is normal and behavior is normal. Judgment and thought content normal. Cognition and memory are normal.   Nursing note and vitals reviewed.      XRAY: No acute fractures or dislocations    Assessment:       1. Left leg injury, initial encounter    2. Infected abrasion of left lower extremity, initial encounter    3. Contusion of multiple sites of left lower extremity, initial encounter        Plan:         Left leg injury, initial encounter  -     X-Ray Knee 3 View Left; Future; Expected date: 12/16/2017  -     X-Ray Tibia Fibula 2 View Left; Future; Expected date: 12/16/2017    Infected abrasion of left lower extremity, initial encounter  -     sulfamethoxazole-trimethoprim 800-160mg (BACTRIM DS) 800-160 mg Tab; Take 1 tablet by mouth 2 (two) times daily.  Dispense: 20 tablet; Refill: 0    Contusion of multiple sites of left lower extremity, initial encounter        Abrasions  Abrasions are skin scrapes. Their treatment depends on how large and deep the abrasion is.  Home care  You may be prescribed an antibiotic cream or ointment to apply to the wound. This helps prevent infection. Follow instructions when using this medicine.  General care  · To care for the abrasion, do the following  each day for as long as directed by your healthcare provider.  ¨ If you were given a bandage, change it once a day. If your bandage sticks to the wound, soak it in warm water until it loosens.  ¨ Wash the area with soap and warm water. You may do this in a sink or under a tub faucet or shower. Rinse off the soap. Then pat the area dry with a clean towel.  ¨ If antibiotic ointment or cream was prescribed, reapply it to the wound as directed. Cover the wound with a fresh nonstick bandage. If the bandage becomes wet or dirty, change it as soon as possible.  ¨ Some antibiotic ointments or cream can cause an allergic reaction or dermatitis. This may cause redness, itching and or hives. If this occurs, stop using the ointment immediately and wash off any remaining ointment. You may need to take some allergy medicine to relieve symptoms.  · You may use acetaminophen or ibuprofen to control pain unless another pain medicine was prescribed. Talk with your healthcare provider before using these medicines if you have chronic liver or kidney disease or ever had a stomach ulcer or GI bleeding. Dont use ibuprofen in children younger than six months old.  · Most skin wounds heal within 10 days. But an infection may occur even with treatment. So its important to watch the wound for signs of infection as listed below.  Follow-up care  Follow up with your healthcare provider, or as advised.  When to seek medical advice  Call your healthcare provider right away if any of these occur:  · Fever of 100.4ºF (38ºC) or higher, or as directed by your healthcare provider  · Increasing pain, redness, swelling, or drainage from the wound  · Bleeding from the wound that does not stop after a few minutes of steady, firm pressure  · Decreased ability to move any body part near the wound  Date Last Reviewed: 3/3/2017  © 3366-4569 [employer]. [address] [place] [address], [place] [address]. All rights reserved. This information is not  intended as a substitute for professional medical care. Always follow your healthcare professional's instructions.      Soft Tissue Contusion  You have a contusion. This is also called a bruise. There is swelling and some bleeding under the skin. This injury generally takes a few days to a few weeks to heal.  During that time, the bruise will typically change in color from reddish, to purple-blue, to greenish-yellow, then to yellow-brown.  Home care  · Elevate the injured area to reduce pain and swelling. As much as possible, sit or lie down with the injured area raised about the level of your heart. This is especially important during the first 48 hours.  · Ice the injured area to help reduce pain and swelling. Wrap a cold source (ice pack or ice cubes in a plastic bag) in a thin towel. Apply to the bruised area for 20 minutes every 1 to 2 hours the first day. Continue this 3 to 4 times a day until the pain and swelling goes away.  · Unless another medication was prescribed, you can take acetaminophen, ibuprofen, or naproxen to control pain. (If you have chronic liver or kidney disease or ever had a stomach ulcer or GI bleeding, talk with your doctor before using these medicines.)  Follow up  Follow up with your health care provider or our staff as advised. Call if you are not better in 1 to 2 weeks.  When to seek medical advice   Call your health care provider right away if you have any of the following:  · Increased pain or swelling  · Bruise is on an arm or leg and arm or leg becomes cold, blue, numb or tingly  · Signs of infection: Warmth, drainage, or increased redness or pain around the contusion  · Inability to move the injured area or body part   · Bruise is near your eye and you have problems with your eyesight or eye   · Frequent bruising for unknown reasons  Date Last Reviewed: 4/29/2015  © 4877-4110 [employer]. [address] [place] [address], [place] [address]. All rights reserved. This  information is not intended as a substitute for professional medical care. Always follow your healthcare professional's instructions.      Please follow up with your Primary care provider within 2-5 days if your signs and symptoms have not resolved or worsen.     If your condition worsens or fails to improve we recommend that you receive another evaluation at the emergency room immediately or contact your primary medical clinic to discuss your concerns.   You must understand that you have received an Urgent Care treatment only and that you may be released before all of your medical problems are known or treated. You, the patient, will arrange for follow up care as instructed.

## 2017-12-16 NOTE — PATIENT INSTRUCTIONS
Abrasions  Abrasions are skin scrapes. Their treatment depends on how large and deep the abrasion is.  Home care  You may be prescribed an antibiotic cream or ointment to apply to the wound. This helps prevent infection. Follow instructions when using this medicine.  General care  · To care for the abrasion, do the following each day for as long as directed by your healthcare provider.  ¨ If you were given a bandage, change it once a day. If your bandage sticks to the wound, soak it in warm water until it loosens.  ¨ Wash the area with soap and warm water. You may do this in a sink or under a tub faucet or shower. Rinse off the soap. Then pat the area dry with a clean towel.  ¨ If antibiotic ointment or cream was prescribed, reapply it to the wound as directed. Cover the wound with a fresh nonstick bandage. If the bandage becomes wet or dirty, change it as soon as possible.  ¨ Some antibiotic ointments or cream can cause an allergic reaction or dermatitis. This may cause redness, itching and or hives. If this occurs, stop using the ointment immediately and wash off any remaining ointment. You may need to take some allergy medicine to relieve symptoms.  · You may use acetaminophen or ibuprofen to control pain unless another pain medicine was prescribed. Talk with your healthcare provider before using these medicines if you have chronic liver or kidney disease or ever had a stomach ulcer or GI bleeding. Dont use ibuprofen in children younger than six months old.  · Most skin wounds heal within 10 days. But an infection may occur even with treatment. So its important to watch the wound for signs of infection as listed below.  Follow-up care  Follow up with your healthcare provider, or as advised.  When to seek medical advice  Call your healthcare provider right away if any of these occur:  · Fever of 100.4ºF (38ºC) or higher, or as directed by your healthcare provider  · Increasing pain, redness, swelling, or  drainage from the wound  · Bleeding from the wound that does not stop after a few minutes of steady, firm pressure  · Decreased ability to move any body part near the wound  Date Last Reviewed: 3/3/2017  © 0893-1753 [employer]. [address] [place] [address], [place] [address]. All rights reserved. This information is not intended as a substitute for professional medical care. Always follow your healthcare professional's instructions.      Soft Tissue Contusion  You have a contusion. This is also called a bruise. There is swelling and some bleeding under the skin. This injury generally takes a few days to a few weeks to heal.  During that time, the bruise will typically change in color from reddish, to purple-blue, to greenish-yellow, then to yellow-brown.  Home care  · Elevate the injured area to reduce pain and swelling. As much as possible, sit or lie down with the injured area raised about the level of your heart. This is especially important during the first 48 hours.  · Ice the injured area to help reduce pain and swelling. Wrap a cold source (ice pack or ice cubes in a plastic bag) in a thin towel. Apply to the bruised area for 20 minutes every 1 to 2 hours the first day. Continue this 3 to 4 times a day until the pain and swelling goes away.  · Unless another medication was prescribed, you can take acetaminophen, ibuprofen, or naproxen to control pain. (If you have chronic liver or kidney disease or ever had a stomach ulcer or GI bleeding, talk with your doctor before using these medicines.)  Follow up  Follow up with your health care provider or our staff as advised. Call if you are not better in 1 to 2 weeks.  When to seek medical advice   Call your health care provider right away if you have any of the following:  · Increased pain or swelling  · Bruise is on an arm or leg and arm or leg becomes cold, blue, numb or tingly  · Signs of infection: Warmth, drainage, or increased redness or pain around  the contusion  · Inability to move the injured area or body part   · Bruise is near your eye and you have problems with your eyesight or eye   · Frequent bruising for unknown reasons  Date Last Reviewed: 4/29/2015  © 8085-1165 [facility]. [address] [place] [address]. All rights reserved. This information is not intended as a substitute for professional medical care. Always follow your healthcare professional's instructions.      Please follow up with your Primary care provider within 2-5 days if your signs and symptoms have not resolved or worsen.     If your condition worsens or fails to improve we recommend that you receive another evaluation at the emergency room immediately or contact your primary medical clinic to discuss your concerns.   You must understand that you have received an Urgent Care treatment only and that you may be released before all of your medical problems are known or treated. You, the patient, will arrange for follow up care as instructed.

## 2017-12-18 ENCOUNTER — OFFICE VISIT (OUTPATIENT)
Dept: FAMILY MEDICINE | Facility: CLINIC | Age: 52
End: 2017-12-18
Payer: COMMERCIAL

## 2017-12-18 VITALS
SYSTOLIC BLOOD PRESSURE: 130 MMHG | HEIGHT: 70 IN | WEIGHT: 249.13 LBS | HEART RATE: 103 BPM | TEMPERATURE: 98 F | OXYGEN SATURATION: 98 % | BODY MASS INDEX: 35.66 KG/M2 | DIASTOLIC BLOOD PRESSURE: 80 MMHG

## 2017-12-18 DIAGNOSIS — Z12.11 COLON CANCER SCREENING: ICD-10-CM

## 2017-12-18 DIAGNOSIS — S89.92XD INJURY OF LEFT LOWER EXTREMITY, SUBSEQUENT ENCOUNTER: ICD-10-CM

## 2017-12-18 DIAGNOSIS — F90.0 ADHD (ATTENTION DEFICIT HYPERACTIVITY DISORDER), INATTENTIVE TYPE: Primary | ICD-10-CM

## 2017-12-18 PROCEDURE — 99999 PR PBB SHADOW E&M-EST. PATIENT-LVL IV: CPT | Mod: PBBFAC,,, | Performed by: NURSE PRACTITIONER

## 2017-12-18 PROCEDURE — 99213 OFFICE O/P EST LOW 20 MIN: CPT | Mod: S$GLB,,, | Performed by: NURSE PRACTITIONER

## 2017-12-18 RX ORDER — DEXTROAMPHETAMINE SULFATE, DEXTROAMPHETAMINE SACCHARATE, AMPHETAMINE SULFATE AND AMPHETAMINE ASPARTATE 7.5; 7.5; 7.5; 7.5 MG/1; MG/1; MG/1; MG/1
60 CAPSULE, EXTENDED RELEASE ORAL EVERY MORNING
Qty: 180 CAPSULE | Refills: 0 | Status: SHIPPED | OUTPATIENT
Start: 2017-12-18 | End: 2018-03-12 | Stop reason: SDUPTHER

## 2017-12-18 NOTE — PROGRESS NOTES
Subjective:       Patient ID: Nick Mcdonough Sr. is a 52 y.o. male.    Chief Complaint: Medication Refill and Leg Pain ([place] and was slipped on pice of wood that hit Lt leg went to urgent care [facility] had X-Ray nothing broke leg swollen in pain and bursed)    Patient is here today for ADHD medication refill. Patient is stable on current regimen. Patient is able to focus and complete tasks effectively. No side effects reported.    Patient reports he has chosen to have colonoscopy performed instead of FIT testing - asking me to order colonoscopy for [facility].    Patient is overdue for fasting labs and WELLNESS exam - he will schedule for next 3 month visit.    Patient has left leg injury - he has an infected abrasion to left lower leg/shin area with redness/swelling and bruising present - he was seen at [facility] on 12/16/17 and put on Bactrim DS daily - xrays were negative for fracture.        Leg Pain          Previous Medications    AMLODIPINE (NORVASC) 10 MG TABLET    Take 1 tablet (10 mg total) by mouth once daily.    DOXAZOSIN (CARDURA) 8 MG TAB    Take 1 tablet (8 mg total) by mouth once daily.    ESOMEPRAZOLE (NEXIUM) 40 MG CAPSULE    Take 1 capsule (40 mg total) by mouth once daily.    FISH OIL-OMEGA-3 FATTY ACIDS 300-1,000 MG CAPSULE    Take 1 g by mouth once daily.    LISINOPRIL (PRINIVIL,ZESTRIL) 40 MG TABLET    TAKE ONE TABLET BY MOUTH EVERY DAY    SULFAMETHOXAZOLE-TRIMETHOPRIM 800-160MG (BACTRIM DS) 800-160 MG TAB    Take 1 tablet by mouth 2 (two) times daily.    TRIAMTERENE-HYDROCHLOROTHIAZIDE 37.5-25 MG (MAXZIDE-25) 37.5-25 MG PER TABLET    Take 1 tablet by mouth once daily.       Past Medical History:   Diagnosis Date    Adult ADHD     Alcohol dependency 9/2015    last alcohol intake September 2015    GERD (gastroesophageal reflux disease)     Hypertension        Past Surgical History:   Procedure Laterality Date    HERNIA REPAIR      INGUINAL HERNIA REPAIR Left  2012    [facility] at [facility]    KNEE SURGERY Right     VASECTOMY         Family History   Problem Relation Age of Onset    Hypertension Father     Cancer Maternal Grandmother     No Known Problems Maternal Grandfather     Heart disease Paternal Grandmother     Alcohol abuse Paternal Grandfather        Social History     Social History    Marital status:      Spouse name: N/A    Number of children: N/A    Years of education: N/A     Social History Main Topics    Smoking status: Current Every Day Smoker     Packs/day: 1.00     Years: 15.00    Smokeless tobacco: Never Used    Alcohol use No      Comment: socially    Drug use: No    Sexual activity: Not Asked     Other Topics Concern    None     Social History Narrative    None       Review of Systems   Constitutional: Negative for activity change, appetite change, fatigue, fever and unexpected weight change.   HENT: Negative for congestion, ear pain, mouth sores, nosebleeds, postnasal drip, rhinorrhea, sinus pressure, sneezing, sore throat, trouble swallowing and voice change.    Eyes: Negative.    Respiratory: Negative for cough, chest tightness and shortness of breath.    Cardiovascular: Negative for chest pain, palpitations and leg swelling.   Gastrointestinal: Negative.  Negative for abdominal pain, blood in stool, constipation, diarrhea, nausea and vomiting.   Endocrine: Negative.    Genitourinary: Negative for difficulty urinating, dysuria, flank pain, hematuria and urgency.   Musculoskeletal: Positive for arthralgias and myalgias. Negative for back pain, gait problem, joint swelling and neck pain.        Left leg pain s/p trauma/fall   Skin: Negative for color change, rash and wound.   Allergic/Immunologic: Negative for immunocompromised state.   Neurological: Negative for dizziness, tremors, seizures, syncope, speech difficulty and headaches.   Hematological: Negative for adenopathy. Does not bruise/bleed easily.   Psychiatric/Behavioral:  "Negative for behavioral problems, dysphoric mood, sleep disturbance and suicidal ideas. The patient is not nervous/anxious.          Objective:     Vitals:    12/18/17 1335   BP: 130/80   BP Location: Right arm   Patient Position: Sitting   BP Method: Medium (Manual)   Pulse: 103   Temp: 98.3 °F (36.8 °C)   TempSrc: Oral   SpO2: 98%   Weight: 113 kg (249 lb 1.9 oz)   Height: 5' 10" (1.778 m)          Physical Exam   Constitutional: He is oriented to person, place, and time. He appears well-developed and well-nourished. No distress.   + obesity with Body mass index is 36.22 kg/m².     HENT:   Head: Normocephalic.   Right Ear: External ear normal.   Left Ear: External ear normal.   Nose: Nose normal.   Mouth/Throat: Oropharynx is clear and moist. No oropharyngeal exudate.   Eyes: EOM are normal. Pupils are equal, round, and reactive to light. Right eye exhibits no discharge. Left eye exhibits no discharge. No scleral icterus.   Neck: Normal range of motion. Neck supple. No JVD present. No tracheal deviation present. No thyromegaly present.   Cardiovascular: Normal rate, regular rhythm and normal heart sounds.    No murmur heard.  Pulmonary/Chest: Effort normal and breath sounds normal. No stridor. No respiratory distress.   Abdominal: Soft. He exhibits no distension and no mass. There is no guarding.   Musculoskeletal: Normal range of motion. He exhibits no edema.        Left lower leg: He exhibits tenderness and swelling.        Legs:  Multiple abrasions to shin and ankle with surrounding redness and swelling present.     Lymphadenopathy:     He has no cervical adenopathy.   Neurological: He is alert and oriented to person, place, and time. Coordination normal.   Skin: Skin is warm and dry. No rash noted. He is not diaphoretic.   Psychiatric: He has a normal mood and affect. His behavior is normal.         Assessment:         ICD-10-CM ICD-9-CM   1. ADHD (attention deficit hyperactivity disorder), inattentive type " F90.0 314.00   2. Injury of left lower extremity, subsequent encounter S89.92XD V58.89     959.7   3. Colon cancer screening Z12.11 V76.51       Plan:       ADHD (attention deficit hyperactivity disorder), inattentive type  -  Controlled on present medication.  Recheck in 3 months.  -     ADDERALL XR 30 mg 24 hr capsule; Take 2 capsules (60 mg total) by mouth every morning.  Dispense: 180 capsule; Refill: 0    Injury of left lower extremity, subsequent encounter  -  Seen at Urgent Care on 12/16/17 - started on Bactrim - take until completed - wound care as per Urgent Care instructions.    Colon cancer screening  -  Colonoscopy requested -someone from department will call to set up appointment.  -     Case request GI: COLONOSCOPY      Return in about 3 months (around 3/18/2018) for fasting labs and WELLNESS EXAM.     Patient's Medications   New Prescriptions    No medications on file   Previous Medications    AMLODIPINE (NORVASC) 10 MG TABLET    Take 1 tablet (10 mg total) by mouth once daily.    DOXAZOSIN (CARDURA) 8 MG TAB    Take 1 tablet (8 mg total) by mouth once daily.    ESOMEPRAZOLE (NEXIUM) 40 MG CAPSULE    Take 1 capsule (40 mg total) by mouth once daily.    FISH OIL-OMEGA-3 FATTY ACIDS 300-1,000 MG CAPSULE    Take 1 g by mouth once daily.    LISINOPRIL (PRINIVIL,ZESTRIL) 40 MG TABLET    TAKE ONE TABLET BY MOUTH EVERY DAY    SULFAMETHOXAZOLE-TRIMETHOPRIM 800-160MG (BACTRIM DS) 800-160 MG TAB    Take 1 tablet by mouth 2 (two) times daily.    TRIAMTERENE-HYDROCHLOROTHIAZIDE 37.5-25 MG (MAXZIDE-25) 37.5-25 MG PER TABLET    Take 1 tablet by mouth once daily.   Modified Medications    Modified Medication Previous Medication    ADDERALL XR 30 MG 24 HR CAPSULE ADDERALL XR 30 mg 24 hr capsule       Take 2 capsules (60 mg total) by mouth every morning.    Take 2 capsules (60 mg total) by mouth every morning.   Discontinued Medications    No medications on file

## 2018-01-16 ENCOUNTER — TELEPHONE (OUTPATIENT)
Dept: GASTROENTEROLOGY | Facility: CLINIC | Age: 53
End: 2018-01-16

## 2018-01-16 ENCOUNTER — PATIENT MESSAGE (OUTPATIENT)
Dept: GASTROENTEROLOGY | Facility: CLINIC | Age: 53
End: 2018-01-16

## 2018-01-16 DIAGNOSIS — Z12.11 SCREENING FOR COLON CANCER: Primary | ICD-10-CM

## 2018-02-02 ENCOUNTER — HOSPITAL ENCOUNTER (OUTPATIENT)
Facility: HOSPITAL | Age: 53
Discharge: HOME | End: 2018-02-02
Attending: INTERNAL MEDICINE | Admitting: INTERNAL MEDICINE
Payer: COMMERCIAL

## 2018-02-02 ENCOUNTER — ANESTHESIA (OUTPATIENT)
Dept: ENDOSCOPY | Facility: HOSPITAL | Age: 53
End: 2018-02-02
Payer: COMMERCIAL

## 2018-02-02 ENCOUNTER — SURGERY (OUTPATIENT)
Age: 53
End: 2018-02-02

## 2018-02-02 ENCOUNTER — ANESTHESIA EVENT (OUTPATIENT)
Dept: ENDOSCOPY | Facility: HOSPITAL | Age: 53
End: 2018-02-02
Payer: COMMERCIAL

## 2018-02-02 VITALS
HEIGHT: 70 IN | HEART RATE: 60 BPM | TEMPERATURE: 98 F | WEIGHT: 235 LBS | SYSTOLIC BLOOD PRESSURE: 110 MMHG | BODY MASS INDEX: 33.64 KG/M2 | RESPIRATION RATE: 14 BRPM | OXYGEN SATURATION: 98 % | DIASTOLIC BLOOD PRESSURE: 68 MMHG

## 2018-02-02 DIAGNOSIS — Z12.11 SCREENING FOR MALIGNANT NEOPLASM OF COLON: Primary | ICD-10-CM

## 2018-02-02 PROCEDURE — 45385 COLONOSCOPY W/LESION REMOVAL: CPT | Performed by: INTERNAL MEDICINE

## 2018-02-02 PROCEDURE — 27201089 HC SNARE, DISP (ANY): Performed by: INTERNAL MEDICINE

## 2018-02-02 PROCEDURE — 88305 TISSUE EXAM BY PATHOLOGIST: CPT | Mod: 26,,, | Performed by: PATHOLOGY

## 2018-02-02 PROCEDURE — 88305 TISSUE EXAM BY PATHOLOGIST: CPT | Performed by: PATHOLOGY

## 2018-02-02 PROCEDURE — 25000003 PHARM REV CODE 250: Performed by: INTERNAL MEDICINE

## 2018-02-02 PROCEDURE — 45385 COLONOSCOPY W/LESION REMOVAL: CPT | Mod: 33,,, | Performed by: INTERNAL MEDICINE

## 2018-02-02 PROCEDURE — 63600175 PHARM REV CODE 636 W HCPCS: Performed by: NURSE ANESTHETIST, CERTIFIED REGISTERED

## 2018-02-02 PROCEDURE — 37000008 HC ANESTHESIA 1ST 15 MINUTES: Performed by: INTERNAL MEDICINE

## 2018-02-02 PROCEDURE — 37000009 HC ANESTHESIA EA ADD 15 MINS: Performed by: INTERNAL MEDICINE

## 2018-02-02 RX ORDER — PROPOFOL 10 MG/ML
VIAL (ML) INTRAVENOUS CONTINUOUS PRN
Status: DISCONTINUED | OUTPATIENT
Start: 2018-02-02 | End: 2018-02-02

## 2018-02-02 RX ORDER — PROPOFOL 10 MG/ML
VIAL (ML) INTRAVENOUS
Status: DISCONTINUED | OUTPATIENT
Start: 2018-02-02 | End: 2018-02-02

## 2018-02-02 RX ORDER — LIDOCAINE HCL/PF 100 MG/5ML
SYRINGE (ML) INTRAVENOUS
Status: DISCONTINUED | OUTPATIENT
Start: 2018-02-02 | End: 2018-02-02

## 2018-02-02 RX ORDER — SODIUM CHLORIDE 9 MG/ML
INJECTION, SOLUTION INTRAVENOUS CONTINUOUS
Status: DISCONTINUED | OUTPATIENT
Start: 2018-02-02 | End: 2018-02-02 | Stop reason: HOSPADM

## 2018-02-02 RX ADMIN — SODIUM CHLORIDE: 0.9 INJECTION, SOLUTION INTRAVENOUS at 09:02

## 2018-02-02 RX ADMIN — PROPOFOL 200 MCG/KG/MIN: 10 INJECTION, EMULSION INTRAVENOUS at 11:02

## 2018-02-02 RX ADMIN — LIDOCAINE HYDROCHLORIDE 75 MG: 20 INJECTION, SOLUTION INTRAVENOUS at 11:02

## 2018-02-02 RX ADMIN — PROPOFOL 70 MG: 10 INJECTION, EMULSION INTRAVENOUS at 11:02

## 2018-02-02 NOTE — PLAN OF CARE
Past Medical History:   Diagnosis Date    Adult ADHD     Alcohol dependency 9/2015    last alcohol intake September 2015    GERD (gastroesophageal reflux disease)     Hypertension      Accompanied by spouse, ok for MD to speak to her re results of exam.

## 2018-02-02 NOTE — DISCHARGE INSTRUCTIONS
Discharge Summary/Instructions for after Colonoscopy with Biopsy/Polypectomy    Nick Mcdonough Sr.  2/2/2018  Eduard Medley MD    Restrictions on Activity:    -  Do not drive car, or operate machinery, make critical decisions, or do activities that   require coordination or balance for 24 hours.  - The following day: return to full activity including work.  - For 3 days: No heavy lifting, straining or running.  - Diet: Eat and drink normally unless instructed otherwise.    Treatment for Common Side Effects:  - Mild abdominal pain and bloating or excessive gas: rest, eat lightly and use a heating pad.     Symptoms to watch for and report to your physician:  1. Severe abdominal pain.  2. Fever within 24 hours after a procedure.  3. A large amount of rectal bleeding. (A small amount of blood from the rectum is not serious, especially if hemorrhoids are present.)  4. Because air was put into your colon during the procedure, expelling large amount of air from your rectum is normal.  5. You may not have a bowel movement for 1-3 days because of the colonoscopy prep. This is normal.  6. Go directly to the emergency room if you notice any of the following:     Chills and/or fever over 101   Persistent vomiting   Severe abdominal pain, other than gas cramps   Severe chest pain   Black, tarry stools   Any bleeding - exceeding one tablespoon    If you have any questions or problems, please call your Physician:    Eduard Medley MD      Lab Results: Contact Physician's Office      If a complication or emergency situation arises and you are unable to reach your Physician - GO TO THE EMERGENCY ROOM.

## 2018-02-02 NOTE — TRANSFER OF CARE
"Anesthesia Transfer of Care Note    Patient: Nick Mcdonough Sr.    Procedure(s) Performed: Procedure(s) (LRB):  COLONOSCOPY (N/A)    Patient location: GI    Anesthesia Type: MAC    Transport from OR: Transported from OR on room air with adequate spontaneous ventilation    Post pain: adequate analgesia    Post assessment: no apparent anesthetic complications    Post vital signs: stable    Level of consciousness: responds to stimulation and sedated    Nausea/Vomiting: no nausea/vomiting    Complications: none    Transfer of care protocol was followed      Last vitals:   Visit Vitals  /74   Pulse 61   Temp 37.2 °C (98.9 °F) (Oral)   Resp 20   Ht 5' 10" (1.778 m)   Wt 106.6 kg (235 lb)   SpO2 98%   BMI 33.72 kg/m²     "

## 2018-02-02 NOTE — H&P
[facility]  Gastroenterology  H&P    Patient Name: Nick Mcdonough Sr.  MRN: [id number]  Admission Date: 2/2/2018  Code Status: Prior    Attending Provider: Eduard Medley MD   Primary Care Physician: Giulia Solis NP  Principal Problem:<principal problem not specified>    Subjective:     History of Present Illness: Screening colonoscopy    Past Medical History:   Diagnosis Date    Adult ADHD     Alcohol dependency 9/2015    last alcohol intake September 2015    GERD (gastroesophageal reflux disease)     Hypertension        Past Surgical History:   Procedure Laterality Date    HERNIA REPAIR      INGUINAL HERNIA REPAIR Left 2012    [facility]    KNEE SURGERY Right     VASECTOMY         Review of patient's allergies indicates:   Allergen Reactions    Pcn [penicillins]      Family History     Problem Relation (Age of Onset)    Alcohol abuse Paternal Grandfather    Cancer Maternal Grandmother    Heart disease Paternal Grandmother    Hypertension Father    No Known Problems Maternal Grandfather        Social History Main Topics    Smoking status: Current Every Day Smoker     Packs/day: 1.00     Years: 15.00    Smokeless tobacco: Never Used    Alcohol use No      Comment: socially    Drug use: No    Sexual activity: Yes     Partners: Female     Review of Systems   Cardiovascular: Negative for chest pain and leg swelling.   Gastrointestinal: Negative for abdominal distention.     Objective:     Vital Signs (Most Recent):  Temp: 98.9 °F (37.2 °C) (02/02/18 0931)  Pulse: 61 (02/02/18 0931)  Resp: 20 (02/02/18 0931)  BP: 125/74 (02/02/18 0945)  SpO2: 98 % (02/02/18 0931) Vital Signs (24h Range):  Temp:  [98.9 °F (37.2 °C)] 98.9 °F (37.2 °C)  Pulse:  [61] 61  Resp:  [20] 20  SpO2:  [98 %] 98 %  BP: (125)/(74) 125/74     Weight: 106.6 kg (235 lb) (02/02/18 0931)  Body mass index is 33.72 kg/m².    No intake or output data in the 24 hours ending 02/02/18 0952    Lines/Drains/Airways      Peripheral Intravenous Line                 Peripheral IV - Single Lumen 02/02/18 0945 Right Antecubital less than 1 day                Physical Exam   Constitutional: He is oriented to person, place, and time. He appears well-developed and well-nourished. No distress.   HENT:   Head: Normocephalic and atraumatic.   Nose: Nose normal.   Eyes: Conjunctivae and EOM are normal. No scleral icterus.   Neck: Normal range of motion. Neck supple. No thyromegaly present.   Cardiovascular: Normal rate, regular rhythm and normal heart sounds.  Exam reveals no gallop and no friction rub.    Pulmonary/Chest: Effort normal and breath sounds normal. No respiratory distress. He has no wheezes.   Abdominal: Soft. Bowel sounds are normal. He exhibits no distension. There is no tenderness.   Neurological: He is alert and oriented to person, place, and time.   Skin: Skin is warm and dry. No rash noted. He is not diaphoretic. No erythema.   Psychiatric: He has a normal mood and affect. His behavior is normal.   Nursing note and vitals reviewed.        Assessment/Plan:   - Colon cancer screening    Plan  1. Colonoscopy    Eduard Medley MD  Gastroenterology  [facility]

## 2018-02-02 NOTE — ANESTHESIA PREPROCEDURE EVALUATION
02/02/2018  Nick Mcdonough Sr. is a 52 y.o., male for colonoscopy under MAC.    Anesthesia Evaluation     I have reviewed the Nursing Notes.   I have reviewed the Medications.     Review of Systems  Social:  Smoker   Cardiovascular:   Hypertension    Hepatic/GI:   Bowel Prep. GERD    Psych:   Psychiatric History (ADHD)          Physical Exam  General:  Obesity       Chest/Lungs:  Chest/Lungs Clear    Heart/Vascular:  Heart Findings: Normal            Anesthesia Plan  Type of Anesthesia, risks & benefits discussed:  Anesthesia Type:  MAC  Patient's Preference:   Intra-op Monitoring Plan:   Intra-op Monitoring Plan Comments:   Post Op Pain Control Plan:   Post Op Pain Control Plan Comments:   Induction:    Beta Blocker:  Patient is not currently on a Beta-Blocker (No further documentation required).       Informed Consent: Patient understands risks and agrees with Anesthesia plan.  Questions answered. Anesthesia consent signed with patient.  ASA Score: 2     Day of Surgery Review of History & Physical:            Ready For Surgery From Anesthesia Perspective.

## 2018-02-12 ENCOUNTER — TELEPHONE (OUTPATIENT)
Dept: GASTROENTEROLOGY | Facility: CLINIC | Age: 53
End: 2018-02-12

## 2018-02-12 ENCOUNTER — PATIENT MESSAGE (OUTPATIENT)
Dept: FAMILY MEDICINE | Facility: CLINIC | Age: 53
End: 2018-02-12

## 2018-02-12 ENCOUNTER — PATIENT MESSAGE (OUTPATIENT)
Dept: GASTROENTEROLOGY | Facility: CLINIC | Age: 53
End: 2018-02-12

## 2018-02-12 DIAGNOSIS — I10 ESSENTIAL HYPERTENSION: ICD-10-CM

## 2018-02-12 DIAGNOSIS — K21.9 GASTROESOPHAGEAL REFLUX DISEASE, ESOPHAGITIS PRESENCE NOT SPECIFIED: ICD-10-CM

## 2018-02-12 RX ORDER — TRIAMTERENE/HYDROCHLOROTHIAZID 37.5-25 MG
1 TABLET ORAL DAILY
Qty: 90 TABLET | Refills: 0 | Status: SHIPPED | OUTPATIENT
Start: 2018-02-12 | End: 2018-03-12 | Stop reason: SDUPTHER

## 2018-02-12 RX ORDER — ESOMEPRAZOLE MAGNESIUM 40 MG/1
CAPSULE, DELAYED RELEASE ORAL
Qty: 90 CAPSULE | Refills: 0 | Status: SHIPPED | OUTPATIENT
Start: 2018-02-12 | End: 2018-05-30 | Stop reason: SDUPTHER

## 2018-03-09 ENCOUNTER — PATIENT MESSAGE (OUTPATIENT)
Dept: FAMILY MEDICINE | Facility: CLINIC | Age: 53
End: 2018-03-09

## 2018-03-12 ENCOUNTER — OFFICE VISIT (OUTPATIENT)
Dept: FAMILY MEDICINE | Facility: CLINIC | Age: 53
End: 2018-03-12
Payer: COMMERCIAL

## 2018-03-12 VITALS
HEIGHT: 70 IN | SYSTOLIC BLOOD PRESSURE: 122 MMHG | WEIGHT: 250.44 LBS | BODY MASS INDEX: 35.85 KG/M2 | TEMPERATURE: 98 F | HEART RATE: 90 BPM | DIASTOLIC BLOOD PRESSURE: 80 MMHG | OXYGEN SATURATION: 97 %

## 2018-03-12 DIAGNOSIS — Z72.0 TOBACCO USER: ICD-10-CM

## 2018-03-12 DIAGNOSIS — I10 ESSENTIAL HYPERTENSION: ICD-10-CM

## 2018-03-12 DIAGNOSIS — Z23 NEED FOR STREPTOCOCCUS PNEUMONIAE VACCINATION: ICD-10-CM

## 2018-03-12 DIAGNOSIS — F90.0 ADHD (ATTENTION DEFICIT HYPERACTIVITY DISORDER), INATTENTIVE TYPE: ICD-10-CM

## 2018-03-12 DIAGNOSIS — Z00.00 ANNUAL PHYSICAL EXAM: Primary | ICD-10-CM

## 2018-03-12 DIAGNOSIS — E78.5 HYPERLIPIDEMIA, UNSPECIFIED HYPERLIPIDEMIA TYPE: ICD-10-CM

## 2018-03-12 DIAGNOSIS — R60.0 FLUID RETENTION IN LEGS: ICD-10-CM

## 2018-03-12 DIAGNOSIS — K21.9 GASTROESOPHAGEAL REFLUX DISEASE, ESOPHAGITIS PRESENCE NOT SPECIFIED: ICD-10-CM

## 2018-03-12 PROBLEM — K63.5 COLON POLYP: Status: ACTIVE | Noted: 2018-02-02

## 2018-03-12 PROCEDURE — 99396 PREV VISIT EST AGE 40-64: CPT | Mod: 25,S$GLB,, | Performed by: NURSE PRACTITIONER

## 2018-03-12 PROCEDURE — 90471 IMMUNIZATION ADMIN: CPT | Mod: S$GLB,,, | Performed by: FAMILY MEDICINE

## 2018-03-12 PROCEDURE — 90732 PPSV23 VACC 2 YRS+ SUBQ/IM: CPT | Mod: S$GLB,,, | Performed by: FAMILY MEDICINE

## 2018-03-12 PROCEDURE — 99999 PR PBB SHADOW E&M-EST. PATIENT-LVL IV: CPT | Mod: PBBFAC,,, | Performed by: NURSE PRACTITIONER

## 2018-03-12 RX ORDER — TRIAMTERENE/HYDROCHLOROTHIAZID 37.5-25 MG
1.5 TABLET ORAL DAILY
Qty: 135 TABLET | Refills: 0 | Status: SHIPPED | OUTPATIENT
Start: 2018-03-12 | End: 2018-05-30 | Stop reason: SDUPTHER

## 2018-03-12 RX ORDER — ROSUVASTATIN CALCIUM 10 MG/1
10 TABLET, COATED ORAL DAILY
Qty: 90 TABLET | Refills: 0 | Status: SHIPPED | OUTPATIENT
Start: 2018-03-12 | End: 2018-05-30 | Stop reason: SDUPTHER

## 2018-03-12 RX ORDER — DEXTROAMPHETAMINE SULFATE, DEXTROAMPHETAMINE SACCHARATE, AMPHETAMINE SULFATE AND AMPHETAMINE ASPARTATE 7.5; 7.5; 7.5; 7.5 MG/1; MG/1; MG/1; MG/1
60 CAPSULE, EXTENDED RELEASE ORAL EVERY MORNING
Qty: 180 CAPSULE | Refills: 0 | Status: SHIPPED | OUTPATIENT
Start: 2018-03-12 | End: 2018-06-20 | Stop reason: SDUPTHER

## 2018-03-12 NOTE — PROGRESS NOTES
"Subjective:       Patient ID: Nick Mcdonough Sr. is a 52 y.o. male.    Chief Complaint: Annual Exam    Patient is a 52-year-old white male here today for annual physical exam with fasting lab results.    Patient has hypertension that is controlled on present medications.  /80 (BP Location: Left arm, Patient Position: Sitting, BP Method: Medium (Manual))   Pulse 90   Temp 98.4 °F (36.9 °C) (Oral)   Ht 5' 10" (1.778 m)   Wt 113.6 kg (250 lb 7.1 oz)   SpO2 97%   BMI 35.93 kg/m²     Patient has GERD that is controlled on Nexium at present dose.    Patient is here today for ADHD medication refill. Patient is stable on current regimen. Patient is able to focus and complete tasks effectively. No side effects reported.    Patient is a smoker and declines smoking cessation program.      Patient had fasting labs drawn this morning and the cholesterol and PSA levels are not back yet. However, due to patient being  age 52 with hypertension and a smoker, his 10 year cardiovascular risk will be elevated based off of last year's cholesterol level.  Discussed medication for cholesterol and cardiovascular risk reduction.  Patient is willing to start medication.      Patient complains of fluid retention to the lower extremities for the past couple of weeks. Patient denies increased sodium intake.  Patient is currently taking Maxzide 25 mg daily for diuretic.    Wellness labs:  -  CBC is within acceptable range  -  CMP as well as then normal limits other than an ALT of 47.  -  Thyroid screening is normal.  -  Await the results of lipid panel and PSA levels.  Labs were drawn today and results are not back yet at time of visit    Health maintenance:  -  Due for pneumonia vaccine.  -  Declines smoking cessation program      Component      Latest Ref Rng & Units 3/12/2018 10/19/2016 1/23/2015   WBC      3.90 - 12.70 K/uL 6.50 9.16 7.92   RBC      4.60 - 6.20 M/uL 4.83 4.95 4.93   Hemoglobin      14.0 - 18.0 g/dL 15.3 16.3 " 16.1   Hematocrit      40.0 - 54.0 % 44.3 46.4 47.0   MCV      82 - 98 fL 92 94 95   MCH      27.0 - 31.0 pg 31.7 (H) 32.9 (H) 32.7 (H)   MCHC      32.0 - 36.0 g/dL 34.5 35.1 34.3   RDW      11.5 - 14.5 % 15.0 (H) 15.2 (H) 14.1   Platelets      150 - 350 K/uL 260 279 286   MPV      9.2 - 12.9 fL 10.9 11.4 11.1   Gran # (ANC)      1.8 - 7.7 K/uL 3.6 6.1 5.0   Lymph #      1.0 - 4.8 K/uL 1.9 2.2 1.9   Mono #      0.3 - 1.0 K/uL 0.6 0.7 0.7   Eos #      0.0 - 0.5 K/uL 0.3 0.1 0.2   Baso #      0.00 - 0.20 K/uL 0.06 0.03 0.05   Gran%      38.0 - 73.0 % 55.3 66.3 63.5   Lymph%      18.0 - 48.0 % 29.7 24.0 23.9   Mono%      4.0 - 15.0 % 8.9 7.6 8.7   Eosinophil%      0.0 - 8.0 % 5.2 1.1 2.8   Basophil%      0.0 - 1.9 % 0.9 0.3 0.6   Differential Method       Automated Automated Automated   Sodium      136 - 145 mmol/L 142 141 141   Potassium      3.5 - 5.1 mmol/L 3.8 3.6 3.9   Chloride      95 - 110 mmol/L 109 106 103   CO2      23 - 29 mmol/L 23 25 26   Glucose      70 - 110 mg/dL 91 99 103   BUN, Bld      2 - 20 mg/dL 16 23 (H) 19   Creatinine      0.50 - 1.40 mg/dL 0.79 1.1 1.0   Calcium      8.7 - 10.5 mg/dL 9.2 10.1 9.4   Total Protein      6.0 - 8.4 g/dL 7.1 7.4 6.9   Albumin      3.5 - 5.2 g/dL 4.1 4.3 3.8   Total Bilirubin      0.1 - 1.0 mg/dL 0.3 0.7 0.4   Alkaline Phosphatase      38 - 126 U/L 96 76 96   AST      15 - 46 U/L 32 37 36   ALT      10 - 44 U/L 47 (H) 44 56 (H)   Anion Gap      8 - 16 mmol/L 10 10 12   eGFR if African American      >60 mL/min/1.73 m:2 >60.0 >60.0 >60.0   eGFR if non African American      >60 mL/min/1.73 m:2 >60.0 >60.0 >60.0   Cholesterol      120 - 199 mg/dL  181 177   Triglycerides      30 - 150 mg/dL  142 115   HDL      40 - 75 mg/dL  34 (L) 28 (L)   LDL Cholesterol      63.0 - 159.0 mg/dL  118.6 126.0   HDL/Chol Ratio      20.0 - 50.0 %  18.8 (L) 15.8 (L)   Total Cholesterol/HDL Ratio      2.0 - 5.0  5.3 (H) 6.3 (H)   Non-HDL Cholesterol      mg/dL  147 149   TSH      0.400 -  4.000 uIU/mL 2.480 2.030 1.932   PSA, SCREEN      0.00 - 4.00 ng/mL  0.74      Previous Medications    ADDERALL XR 30 MG 24 HR CAPSULE    Take 2 capsules (60 mg total) by mouth every morning.    AMLODIPINE (NORVASC) 10 MG TABLET    Take 1 tablet (10 mg total) by mouth once daily.    DOXAZOSIN (CARDURA) 8 MG TAB    Take 1 tablet (8 mg total) by mouth once daily.    ESOMEPRAZOLE (NEXIUM) 40 MG CAPSULE    TAKE ONE CAPSULE BY MOUTH ONCE DAILY    FISH OIL-OMEGA-3 FATTY ACIDS 300-1,000 MG CAPSULE    Take 1 g by mouth once daily.    LISINOPRIL (PRINIVIL,ZESTRIL) 40 MG TABLET    TAKE ONE TABLET BY MOUTH EVERY DAY    TRIAMTERENE-HYDROCHLOROTHIAZIDE 37.5-25 MG (MAXZIDE-25) 37.5-25 MG PER TABLET    Take 1 tablet by mouth once daily.       Past Medical History:   Diagnosis Date    Adult ADHD     Alcohol dependency 9/2015    last alcohol intake September 2015    Colon polyp 02/02/2018    2 polyps - tubular adenoma - repeat colonoscopy in 5 years    GERD (gastroesophageal reflux disease)     Hypertension        Past Surgical History:   Procedure Laterality Date    COLONOSCOPY N/A 2/2/2018    Procedure: COLONOSCOPY;  Surgeon: Eduard Medley MD;  Location: [facility];  Service: Endoscopy;  Laterality: N/A;    HERNIA REPAIR      INGUINAL HERNIA REPAIR Left 2012    [facility] at [facility]    KNEE SURGERY Right     VASECTOMY         Family History   Problem Relation Age of Onset    Hypertension Father     Cancer Maternal Grandmother     No Known Problems Maternal Grandfather     Heart disease Paternal Grandmother     Alcohol abuse Paternal Grandfather        Social History     Social History    Marital status:      Spouse name: N/A    Number of children: N/A    Years of education: N/A     Social History Main Topics    Smoking status: Current Every Day Smoker     Packs/day: 1.00     Years: 15.00    Smokeless tobacco: Never Used    Alcohol use No    Drug use: No    Sexual activity: Yes     Partners: Female  "    Other Topics Concern    None     Social History Narrative    None       Review of Systems   Constitutional: Negative for activity change, appetite change, fatigue, fever and unexpected weight change.   HENT: Negative for congestion, ear pain, mouth sores, nosebleeds, postnasal drip, rhinorrhea, sinus pressure, sneezing, sore throat, trouble swallowing and voice change.    Eyes: Negative.    Respiratory: Negative for cough, chest tightness and shortness of breath.    Cardiovascular: Positive for leg swelling. Negative for chest pain and palpitations.   Gastrointestinal: Negative.  Negative for abdominal pain, blood in stool, constipation, diarrhea, nausea and vomiting.   Endocrine: Negative.    Genitourinary: Negative for difficulty urinating, dysuria, flank pain, hematuria and urgency.   Musculoskeletal: Negative for arthralgias, back pain, gait problem, joint swelling, myalgias and neck pain.   Skin: Negative for color change, rash and wound.   Allergic/Immunologic: Negative for immunocompromised state.   Neurological: Negative for dizziness, tremors, seizures, syncope, speech difficulty and headaches.   Hematological: Negative for adenopathy. Does not bruise/bleed easily.   Psychiatric/Behavioral: Negative for behavioral problems, dysphoric mood, sleep disturbance and suicidal ideas. The patient is not nervous/anxious.          Objective:     Vitals:    03/12/18 1319   BP: 122/80   BP Location: Left arm   Patient Position: Sitting   BP Method: Medium (Manual)   Pulse: 90   Temp: 98.4 °F (36.9 °C)   TempSrc: Oral   SpO2: 97%   Weight: 113.6 kg (250 lb 7.1 oz)   Height: 5' 10" (1.778 m)          Physical Exam   Constitutional: He is oriented to person, place, and time. He appears well-developed and well-nourished. No distress.   + obesity with Body mass index is 35.93 kg/m².       HENT:   Head: Normocephalic.   Right Ear: External ear normal.   Left Ear: External ear normal.   Nose: Nose normal. "   Mouth/Throat: Oropharynx is clear and moist. No oropharyngeal exudate.   Eyes: EOM are normal. Pupils are equal, round, and reactive to light. Right eye exhibits no discharge. Left eye exhibits no discharge. No scleral icterus.   Neck: Normal range of motion. Neck supple. No JVD present. No tracheal deviation present. No thyromegaly present.   Cardiovascular: Normal rate, regular rhythm and normal heart sounds.    No murmur heard.  Pulmonary/Chest: Effort normal and breath sounds normal. No stridor. No respiratory distress.   Abdominal: Soft. He exhibits no distension and no mass. There is no guarding.   Musculoskeletal: Normal range of motion. He exhibits edema.   Trace to 1+ edema to lower legs   Lymphadenopathy:     He has no cervical adenopathy.   Neurological: He is alert and oriented to person, place, and time. Coordination normal.   Skin: Skin is warm and dry. No rash noted. He is not diaphoretic.   Psychiatric: He has a normal mood and affect. His behavior is normal.         Assessment:         ICD-10-CM ICD-9-CM   1. Annual physical exam Z00.00 V70.0   2. Essential hypertension I10 401.9   3. Fluid retention in legs R60.0 782.3   4. ADHD (attention deficit hyperactivity disorder), inattentive type F90.0 314.00   5. Hyperlipidemia, unspecified hyperlipidemia type E78.5 272.4   6. Gastroesophageal reflux disease, esophagitis presence not specified K21.9 530.81   7. Tobacco user Z72.0 305.1   8. Need for Streptococcus pneumoniae vaccination Z23 V03.82       Plan:       Annual physical exam  Health Maintenance Summary     Lipid Panel Next Due 10/19/2021     Done 10/19/2016 LIPID PANEL     Done 1/23/2015 LIPID PANEL    Colonoscopy Next Due 2/2/2023     Done 2/2/2018 COLONOSCOPY   TETANUS VACCINE Next Due 7/30/2027     Done 7/30/2017 Imm Admin: Tdap    Done 9/22/2005 Imm Admin: Td (ADULT)   Pneumococcal PPSV23 (Medium Risk) Next Due 8/21/2031     Done 3/12/2018 Imm Admin: Pneumococcal Polysaccharide - 23 Valent    Influenza Vaccine Completed     Done 9/22/2017 Imm Admin: Influenza - Quadrivalent - PF    Done 9/10/2013 Imm Admin: Influenza - Intranasal         Essential hypertension  -  May increase the Maxzide 25 mg to 1-1/2 tablets daily - continue other medications at present dose.  Recheck in 3 months  -     triamterene-hydrochlorothiazide 37.5-25 mg (MAXZIDE-25) 37.5-25 mg per tablet; Take 1.5 tablets by mouth once daily.  Dispense: 135 tablet; Refill: 0  -     Comprehensive metabolic panel; Future; Expected date: 03/12/2018    Fluid retention in legs  -  Increasing the Maxide to 1-1/2 tablets daily.    ADHD (attention deficit hyperactivity disorder), inattentive type  -  Controlled on present medication and dose.  -     ADDERALL XR 30 mg 24 hr capsule; Take 2 capsules (60 mg total) by mouth every morning.  Dispense: 180 capsule; Refill: 0    Hyperlipidemia, unspecified hyperlipidemia type  -  Cholesterol panel was not back for labs drawn today.  However, based on patient's age of 52, hypertension, and a current day smoker, his 10 year cardiovascular risk is elevated based on his labs in 2016 and a statin is currently recommended.  Patient is in agreement to start medication.  Start Crestor 10 mg daily and will recheck fasting labs in 3 months.  -     rosuvastatin (CRESTOR) 10 MG tablet; Take 1 tablet (10 mg total) by mouth once daily.  Dispense: 90 tablet; Refill: 0  -     Lipid panel; Future; Expected date: 03/12/2018    Gastroesophageal reflux disease, esophagitis presence not specified    Tobacco user  -  Declined smoking cessation program.  -     Pneumococcal Polysaccharide Vaccine (23 Valent) (SQ/IM)    Need for Streptococcus pneumoniae vaccination  -     Pneumococcal Polysaccharide Vaccine (23 Valent) (SQ/IM)      Follow-up in about 3 months (around 6/12/2018) for fasting labs and office visit.     Patient's Medications   New Prescriptions    ROSUVASTATIN (CRESTOR) 10 MG TABLET    Take 1 tablet (10 mg total) by  mouth once daily.   Previous Medications    AMLODIPINE (NORVASC) 10 MG TABLET    Take 1 tablet (10 mg total) by mouth once daily.    DOXAZOSIN (CARDURA) 8 MG TAB    Take 1 tablet (8 mg total) by mouth once daily.    ESOMEPRAZOLE (NEXIUM) 40 MG CAPSULE    TAKE ONE CAPSULE BY MOUTH ONCE DAILY    FISH OIL-OMEGA-3 FATTY ACIDS 300-1,000 MG CAPSULE    Take 1 g by mouth once daily.    LISINOPRIL (PRINIVIL,ZESTRIL) 40 MG TABLET    TAKE ONE TABLET BY MOUTH EVERY DAY   Modified Medications    Modified Medication Previous Medication    ADDERALL XR 30 MG 24 HR CAPSULE ADDERALL XR 30 mg 24 hr capsule       Take 2 capsules (60 mg total) by mouth every morning.    Take 2 capsules (60 mg total) by mouth every morning.    TRIAMTERENE-HYDROCHLOROTHIAZIDE 37.5-25 MG (MAXZIDE-25) 37.5-25 MG PER TABLET triamterene-hydrochlorothiazide 37.5-25 mg (MAXZIDE-25) 37.5-25 mg per tablet       Take 1.5 tablets by mouth once daily.    Take 1 tablet by mouth once daily.   Discontinued Medications    No medications on file

## 2018-05-30 DIAGNOSIS — I10 ESSENTIAL HYPERTENSION: ICD-10-CM

## 2018-05-30 DIAGNOSIS — K21.9 GASTROESOPHAGEAL REFLUX DISEASE, ESOPHAGITIS PRESENCE NOT SPECIFIED: ICD-10-CM

## 2018-05-30 DIAGNOSIS — E78.5 HYPERLIPIDEMIA, UNSPECIFIED HYPERLIPIDEMIA TYPE: ICD-10-CM

## 2018-05-31 RX ORDER — DOXAZOSIN 8 MG/1
8 TABLET ORAL DAILY
Qty: 90 TABLET | Refills: 0 | Status: SHIPPED | OUTPATIENT
Start: 2018-05-31 | End: 2018-08-17 | Stop reason: SDUPTHER

## 2018-05-31 RX ORDER — ESOMEPRAZOLE MAGNESIUM 40 MG/1
40 CAPSULE, DELAYED RELEASE ORAL DAILY
Qty: 90 CAPSULE | Refills: 0 | Status: SHIPPED | OUTPATIENT
Start: 2018-05-31 | End: 2018-08-17 | Stop reason: SDUPTHER

## 2018-05-31 RX ORDER — TRIAMTERENE/HYDROCHLOROTHIAZID 37.5-25 MG
1.5 TABLET ORAL DAILY
Qty: 135 TABLET | Refills: 0 | Status: SHIPPED | OUTPATIENT
Start: 2018-05-31 | End: 2018-08-17 | Stop reason: SDUPTHER

## 2018-05-31 RX ORDER — ROSUVASTATIN CALCIUM 10 MG/1
10 TABLET, COATED ORAL DAILY
Qty: 90 TABLET | Refills: 0 | Status: SHIPPED | OUTPATIENT
Start: 2018-05-31 | End: 2018-08-17 | Stop reason: SDUPTHER

## 2018-05-31 RX ORDER — AMLODIPINE BESYLATE 10 MG/1
10 TABLET ORAL DAILY
Qty: 90 TABLET | Refills: 0 | Status: SHIPPED | OUTPATIENT
Start: 2018-05-31 | End: 2018-08-17 | Stop reason: SDUPTHER

## 2018-05-31 RX ORDER — LISINOPRIL 40 MG/1
40 TABLET ORAL DAILY
Qty: 90 TABLET | Refills: 0 | Status: SHIPPED | OUTPATIENT
Start: 2018-05-31 | End: 2018-08-17 | Stop reason: SDUPTHER

## 2018-06-05 ENCOUNTER — PATIENT MESSAGE (OUTPATIENT)
Dept: FAMILY MEDICINE | Facility: CLINIC | Age: 53
End: 2018-06-05

## 2018-06-20 ENCOUNTER — OFFICE VISIT (OUTPATIENT)
Dept: FAMILY MEDICINE | Facility: CLINIC | Age: 53
End: 2018-06-20
Payer: COMMERCIAL

## 2018-06-20 VITALS
TEMPERATURE: 99 F | WEIGHT: 257.69 LBS | BODY MASS INDEX: 36.89 KG/M2 | OXYGEN SATURATION: 98 % | HEART RATE: 87 BPM | SYSTOLIC BLOOD PRESSURE: 130 MMHG | DIASTOLIC BLOOD PRESSURE: 74 MMHG | HEIGHT: 70 IN

## 2018-06-20 DIAGNOSIS — E78.5 HYPERLIPIDEMIA, UNSPECIFIED HYPERLIPIDEMIA TYPE: ICD-10-CM

## 2018-06-20 DIAGNOSIS — R60.0 FLUID RETENTION IN LEGS: ICD-10-CM

## 2018-06-20 DIAGNOSIS — I10 ESSENTIAL HYPERTENSION: Primary | ICD-10-CM

## 2018-06-20 DIAGNOSIS — K21.9 GASTROESOPHAGEAL REFLUX DISEASE, ESOPHAGITIS PRESENCE NOT SPECIFIED: ICD-10-CM

## 2018-06-20 DIAGNOSIS — F90.0 ADHD (ATTENTION DEFICIT HYPERACTIVITY DISORDER), INATTENTIVE TYPE: ICD-10-CM

## 2018-06-20 PROCEDURE — 3008F BODY MASS INDEX DOCD: CPT | Mod: CPTII,S$GLB,, | Performed by: NURSE PRACTITIONER

## 2018-06-20 PROCEDURE — 99999 PR PBB SHADOW E&M-EST. PATIENT-LVL IV: CPT | Mod: PBBFAC,,, | Performed by: NURSE PRACTITIONER

## 2018-06-20 PROCEDURE — 3075F SYST BP GE 130 - 139MM HG: CPT | Mod: CPTII,S$GLB,, | Performed by: NURSE PRACTITIONER

## 2018-06-20 PROCEDURE — 3078F DIAST BP <80 MM HG: CPT | Mod: CPTII,S$GLB,, | Performed by: NURSE PRACTITIONER

## 2018-06-20 PROCEDURE — 99214 OFFICE O/P EST MOD 30 MIN: CPT | Mod: S$GLB,,, | Performed by: NURSE PRACTITIONER

## 2018-06-20 RX ORDER — DEXTROAMPHETAMINE SULFATE, DEXTROAMPHETAMINE SACCHARATE, AMPHETAMINE SULFATE AND AMPHETAMINE ASPARTATE 7.5; 7.5; 7.5; 7.5 MG/1; MG/1; MG/1; MG/1
60 CAPSULE, EXTENDED RELEASE ORAL EVERY MORNING
Qty: 180 CAPSULE | Refills: 0 | Status: SHIPPED | OUTPATIENT
Start: 2018-06-20 | End: 2018-09-19 | Stop reason: SDUPTHER

## 2018-06-20 NOTE — PROGRESS NOTES
"Subjective:       Patient ID: Nick Mcdonough Sr. is a 52 y.o. male.    Chief Complaint: Follow-up (lab results) and Medication Refill    Patient is a 52-year-old white male with hypertension, GERD, ADHD, tobacco user, fluid retention to lower extremities, and now on hyperlipidemia medication that is here today for follow-up with fasting lab results.    Patient has hypertension that is controlled on present medications.  /74 (BP Location: Right arm, Patient Position: Sitting, BP Method: Medium (Manual))   Pulse 87   Temp 99.2 °F (37.3 °C) (Oral)   Ht 5' 10" (1.778 m)   Wt 116.9 kg (257 lb 11.5 oz)   SpO2 98%   BMI 36.98 kg/m²     Patient had complained of fluid retention to lower extremities at last visit.  I increased his Maxide 25 mg to 1-1/2 tablets daily and patient reports fluid retention is resolved.  Kidney function is stable on this increased dose.    Patient has GERD that is controlled on Nexium at present dose.    Patient has ADHD that is stable on current regimen.  Patient is able to focus and complete tasks effectively.  No side effects reported.    Patient is a smoker and declines smoking cessation program.    Patient has history of hyperlipidemia.  However due to patient age of 52, hypertension, smoker, his 10 year risk was greater than 10% so was started on Crestor 10 mg daily at last visit.  Cholesterol levels are improved and LDL is 54.0.          Component      Latest Ref Rng & Units 6/16/2018 3/12/2018 10/19/2016 1/23/2015   Sodium      136 - 145 mmol/L 144 142 141 141   Potassium      3.5 - 5.1 mmol/L 3.5 3.8 3.6 3.9   Chloride      95 - 110 mmol/L 102 109 106 103   CO2      23 - 29 mmol/L 30 (H) 23 25 26   Glucose      70 - 110 mg/dL 102 91 99 103   BUN, Bld      2 - 20 mg/dL 22 (H) 16 23 (H) 19   Creatinine      0.50 - 1.40 mg/dL 0.94 0.79 1.1 1.0   Calcium      8.7 - 10.5 mg/dL 9.3 9.2 10.1 9.4   Total Protein      6.0 - 8.4 g/dL 6.9 7.1 7.4 6.9   Albumin      3.5 - 5.2 g/dL 4.0 " 4.1 4.3 3.8   Total Bilirubin      0.1 - 1.0 mg/dL 0.5 0.3 0.7 0.4   Alkaline Phosphatase      38 - 126 U/L 81 96 76 96   AST      15 - 46 U/L 38 32 37 36   ALT      10 - 44 U/L 49 (H) 47 (H) 44 56 (H)   Anion Gap      8 - 16 mmol/L 12 10 10 12   eGFR if African American      >60 mL/min/1.73 m:2 >60.0 >60.0 >60.0 >60.0   eGFR if non African American      >60 mL/min/1.73 m:2 >60.0 >60.0 >60.0 >60.0   Cholesterol      120 - 199 mg/dL 103 (L) 154 181 177   Triglycerides      30 - 150 mg/dL 115 149 142 115   HDL      40 - 75 mg/dL 26 (L) 30 (L) 34 (L) 28 (L)   LDL Cholesterol      63.0 - 159.0 mg/dL 54.0 (L) 94.2 118.6 126.0   HDL/Chol Ratio      20.0 - 50.0 % 25.2 19.5 (L) 18.8 (L) 15.8 (L)   Total Cholesterol/HDL Ratio      2.0 - 5.0 4.0 5.1 (H) 5.3 (H) 6.3 (H)   Non-HDL Cholesterol      mg/dL 77 124 147 149       Previous Medications    ADDERALL XR 30 MG 24 HR CAPSULE    Take 2 capsules (60 mg total) by mouth every morning.    AMLODIPINE (NORVASC) 10 MG TABLET    Take 1 tablet (10 mg total) by mouth once daily.    DOXAZOSIN (CARDURA) 8 MG TAB    Take 1 tablet (8 mg total) by mouth once daily.    ESOMEPRAZOLE (NEXIUM) 40 MG CAPSULE    Take 1 capsule (40 mg total) by mouth once daily.    FISH OIL-OMEGA-3 FATTY ACIDS 300-1,000 MG CAPSULE    Take 1 g by mouth once daily.    LISINOPRIL (PRINIVIL,ZESTRIL) 40 MG TABLET    Take 1 tablet (40 mg total) by mouth once daily.    ROSUVASTATIN (CRESTOR) 10 MG TABLET    Take 1 tablet (10 mg total) by mouth once daily.    TRIAMTERENE-HYDROCHLOROTHIAZIDE 37.5-25 MG (MAXZIDE-25) 37.5-25 MG PER TABLET    Take 1.5 tablets by mouth once daily.       Past Medical History:   Diagnosis Date    Adult ADHD     Alcohol dependency 9/2015    last alcohol intake September 2015    Colon polyp 02/02/2018    2 polyps - tubular adenoma - repeat colonoscopy in 5 years    GERD (gastroesophageal reflux disease)     Hypertension        Past Surgical History:   Procedure Laterality Date     COLONOSCOPY N/A 2018    Procedure: COLONOSCOPY;  Surgeon: Eduard Medley MD;  Location: [facility] ENDO;  Service: Endoscopy;  Laterality: N/A;    HERNIA REPAIR      INGUINAL HERNIA REPAIR Left 2012    done at [facility]    KNEE SURGERY Right     VASECTOMY         Family History   Problem Relation Age of Onset    Hypertension Father     Heart disease Brother         acute MI -  at at 46    Cancer Maternal Grandmother     No Known Problems Maternal Grandfather     Heart disease Paternal Grandmother     Alcohol abuse Paternal Grandfather        Social History     Social History    Marital status:      Spouse name: N/A    Number of children: N/A    Years of education: N/A     Social History Main Topics    Smoking status: Current Every Day Smoker     Packs/day: 1.00     Years: 15.00    Smokeless tobacco: Never Used    Alcohol use No    Drug use: No    Sexual activity: Yes     Partners: Female     Other Topics Concern    None     Social History Narrative    None       Review of Systems   Constitutional: Negative for activity change, appetite change, fatigue, fever and unexpected weight change.   HENT: Negative for congestion, ear pain, mouth sores, nosebleeds, postnasal drip, rhinorrhea, sinus pressure, sneezing, sore throat, trouble swallowing and voice change.    Eyes: Negative.    Respiratory: Negative for cough, chest tightness and shortness of breath.    Cardiovascular: Negative for chest pain, palpitations and leg swelling.   Gastrointestinal: Negative.  Negative for abdominal pain, blood in stool, constipation, diarrhea, nausea and vomiting.   Endocrine: Negative.    Genitourinary: Negative for difficulty urinating, dysuria, flank pain, hematuria and urgency.   Musculoskeletal: Negative for arthralgias, back pain, gait problem, joint swelling, myalgias and neck pain.   Skin: Negative for color change, rash and wound.   Allergic/Immunologic: Negative for immunocompromised state.  "  Neurological: Negative for dizziness, tremors, seizures, syncope, speech difficulty and headaches.   Hematological: Negative for adenopathy. Does not bruise/bleed easily.   Psychiatric/Behavioral: Negative for behavioral problems, dysphoric mood, sleep disturbance and suicidal ideas. The patient is not nervous/anxious.          Objective:     Vitals:    06/20/18 1456   BP: 130/74   BP Location: Right arm   Patient Position: Sitting   BP Method: Medium (Manual)   Pulse: 87   Temp: 99.2 °F (37.3 °C)   TempSrc: Oral   SpO2: 98%   Weight: 116.9 kg (257 lb 11.5 oz)   Height: 5' 10" (1.778 m)          Physical Exam   Constitutional: He is oriented to person, place, and time. He appears well-developed and well-nourished. No distress.   + obesity with Body mass index is 36.98 kg/m².         HENT:   Head: Normocephalic.   Right Ear: External ear normal.   Left Ear: External ear normal.   Nose: Nose normal.   Mouth/Throat: Oropharynx is clear and moist. No oropharyngeal exudate.   Eyes: EOM are normal. Pupils are equal, round, and reactive to light. Right eye exhibits no discharge. Left eye exhibits no discharge. No scleral icterus.   Neck: Normal range of motion. Neck supple. No JVD present. No tracheal deviation present. No thyromegaly present.   Cardiovascular: Normal rate, regular rhythm and normal heart sounds.    No murmur heard.  Pulmonary/Chest: Effort normal and breath sounds normal. No stridor. No respiratory distress.   Abdominal: Soft. He exhibits no distension and no mass. There is no guarding.   Musculoskeletal: Normal range of motion.   Lymphadenopathy:     He has no cervical adenopathy.   Neurological: He is alert and oriented to person, place, and time. Coordination normal.   Skin: Skin is warm and dry. No rash noted. He is not diaphoretic.   Psychiatric: He has a normal mood and affect. His behavior is normal.         Assessment:         ICD-10-CM ICD-9-CM   1. Essential hypertension I10 401.9   2. Fluid " retention in legs R60.0 782.3   3. ADHD (attention deficit hyperactivity disorder), inattentive type F90.0 314.00   4. Hyperlipidemia, unspecified hyperlipidemia type E78.5 272.4   5. Gastroesophageal reflux disease, esophagitis presence not specified K21.9 530.81       Plan:       Essential hypertension  -  Controlled on amlodipine 10 mg daily doxazosin 8 mg daily lisinopril 40 mg daily and Maxide 25 mg one half tablets daily.  Will send refill request when needed.  Recheck in 6 months.    Fluid retention in legs    ADHD (attention deficit hyperactivity disorder), inattentive type  -  Controlled on present medication.  Follow up in 6 months  -     ADDERALL XR 30 mg 24 hr capsule; Take 2 capsules (60 mg total) by mouth every morning.  Dispense: 180 capsule; Refill: 0    Hyperlipidemia, unspecified hyperlipidemia type  -  Continue Crestor present dose.  Follow-up for fasting labs and office visit in 6 months.    Gastroesophageal reflux disease, esophagitis presence not specified  -  Controlled on Nexium at present dose      Follow-up in about 3 months (around 9/20/2018) for ADHD check only; 6 months will be labs and visit.     Patient's Medications   New Prescriptions    No medications on file   Previous Medications    AMLODIPINE (NORVASC) 10 MG TABLET    Take 1 tablet (10 mg total) by mouth once daily.    DOXAZOSIN (CARDURA) 8 MG TAB    Take 1 tablet (8 mg total) by mouth once daily.    ESOMEPRAZOLE (NEXIUM) 40 MG CAPSULE    Take 1 capsule (40 mg total) by mouth once daily.    FISH OIL-OMEGA-3 FATTY ACIDS 300-1,000 MG CAPSULE    Take 1 g by mouth once daily.    LISINOPRIL (PRINIVIL,ZESTRIL) 40 MG TABLET    Take 1 tablet (40 mg total) by mouth once daily.    ROSUVASTATIN (CRESTOR) 10 MG TABLET    Take 1 tablet (10 mg total) by mouth once daily.    TRIAMTERENE-HYDROCHLOROTHIAZIDE 37.5-25 MG (MAXZIDE-25) 37.5-25 MG PER TABLET    Take 1.5 tablets by mouth once daily.   Modified Medications    Modified Medication  Previous Medication    ADDERALL XR 30 MG 24 HR CAPSULE ADDERALL XR 30 mg 24 hr capsule       Take 2 capsules (60 mg total) by mouth every morning.    Take 2 capsules (60 mg total) by mouth every morning.   Discontinued Medications    No medications on file

## 2018-08-17 DIAGNOSIS — E78.5 HYPERLIPIDEMIA, UNSPECIFIED HYPERLIPIDEMIA TYPE: ICD-10-CM

## 2018-08-17 DIAGNOSIS — K21.9 GASTROESOPHAGEAL REFLUX DISEASE, ESOPHAGITIS PRESENCE NOT SPECIFIED: ICD-10-CM

## 2018-08-17 DIAGNOSIS — I10 ESSENTIAL HYPERTENSION: ICD-10-CM

## 2018-08-20 RX ORDER — DOXAZOSIN 8 MG/1
8 TABLET ORAL DAILY
Qty: 90 TABLET | Refills: 0 | Status: SHIPPED | OUTPATIENT
Start: 2018-08-20 | End: 2018-11-09 | Stop reason: SDUPTHER

## 2018-08-20 RX ORDER — ROSUVASTATIN CALCIUM 10 MG/1
10 TABLET, COATED ORAL DAILY
Qty: 90 TABLET | Refills: 0 | Status: SHIPPED | OUTPATIENT
Start: 2018-08-20 | End: 2018-11-09 | Stop reason: SDUPTHER

## 2018-08-20 RX ORDER — ESOMEPRAZOLE MAGNESIUM 40 MG/1
40 CAPSULE, DELAYED RELEASE ORAL DAILY
Qty: 90 CAPSULE | Refills: 0 | Status: SHIPPED | OUTPATIENT
Start: 2018-08-20 | End: 2018-11-09 | Stop reason: SDUPTHER

## 2018-08-20 RX ORDER — LISINOPRIL 40 MG/1
40 TABLET ORAL DAILY
Qty: 90 TABLET | Refills: 0 | Status: SHIPPED | OUTPATIENT
Start: 2018-08-20 | End: 2018-11-09 | Stop reason: SDUPTHER

## 2018-08-20 RX ORDER — AMLODIPINE BESYLATE 10 MG/1
10 TABLET ORAL DAILY
Qty: 90 TABLET | Refills: 0 | Status: SHIPPED | OUTPATIENT
Start: 2018-08-20 | End: 2018-11-09 | Stop reason: SDUPTHER

## 2018-08-20 RX ORDER — TRIAMTERENE/HYDROCHLOROTHIAZID 37.5-25 MG
1.5 TABLET ORAL DAILY
Qty: 135 TABLET | Refills: 0 | Status: SHIPPED | OUTPATIENT
Start: 2018-08-20 | End: 2018-11-09 | Stop reason: SDUPTHER

## 2018-09-19 ENCOUNTER — OFFICE VISIT (OUTPATIENT)
Dept: FAMILY MEDICINE | Facility: CLINIC | Age: 53
End: 2018-09-19
Payer: COMMERCIAL

## 2018-09-19 VITALS
TEMPERATURE: 99 F | WEIGHT: 256.19 LBS | DIASTOLIC BLOOD PRESSURE: 76 MMHG | SYSTOLIC BLOOD PRESSURE: 120 MMHG | HEART RATE: 85 BPM | HEIGHT: 70 IN | BODY MASS INDEX: 36.68 KG/M2 | OXYGEN SATURATION: 98 %

## 2018-09-19 DIAGNOSIS — F90.0 ADHD (ATTENTION DEFICIT HYPERACTIVITY DISORDER), INATTENTIVE TYPE: Primary | ICD-10-CM

## 2018-09-19 PROBLEM — K63.5 COLON POLYP: Status: RESOLVED | Noted: 2018-02-02 | Resolved: 2018-09-19

## 2018-09-19 PROBLEM — Z86.0100 HISTORY OF COLON POLYPS: Status: ACTIVE | Noted: 2018-09-19

## 2018-09-19 PROBLEM — Z86.010 HISTORY OF COLON POLYPS: Status: ACTIVE | Noted: 2018-09-19

## 2018-09-19 PROBLEM — Z12.11 SCREENING FOR MALIGNANT NEOPLASM OF COLON: Status: RESOLVED | Noted: 2018-02-02 | Resolved: 2018-09-19

## 2018-09-19 PROBLEM — D72.829 LEUKOCYTOSIS: Status: RESOLVED | Noted: 2017-07-30 | Resolved: 2018-09-19

## 2018-09-19 PROCEDURE — 90686 IIV4 VACC NO PRSV 0.5 ML IM: CPT | Mod: S$GLB,,, | Performed by: FAMILY MEDICINE

## 2018-09-19 PROCEDURE — 3074F SYST BP LT 130 MM HG: CPT | Mod: CPTII,S$GLB,, | Performed by: FAMILY MEDICINE

## 2018-09-19 PROCEDURE — 99213 OFFICE O/P EST LOW 20 MIN: CPT | Mod: 25,S$GLB,, | Performed by: FAMILY MEDICINE

## 2018-09-19 PROCEDURE — 99999 PR PBB SHADOW E&M-EST. PATIENT-LVL IV: CPT | Mod: PBBFAC,,, | Performed by: FAMILY MEDICINE

## 2018-09-19 PROCEDURE — 3078F DIAST BP <80 MM HG: CPT | Mod: CPTII,S$GLB,, | Performed by: FAMILY MEDICINE

## 2018-09-19 PROCEDURE — 3008F BODY MASS INDEX DOCD: CPT | Mod: CPTII,S$GLB,, | Performed by: FAMILY MEDICINE

## 2018-09-19 PROCEDURE — 90471 IMMUNIZATION ADMIN: CPT | Mod: S$GLB,,, | Performed by: FAMILY MEDICINE

## 2018-09-19 RX ORDER — DEXTROAMPHETAMINE SULFATE, DEXTROAMPHETAMINE SACCHARATE, AMPHETAMINE SULFATE AND AMPHETAMINE ASPARTATE 7.5; 7.5; 7.5; 7.5 MG/1; MG/1; MG/1; MG/1
60 CAPSULE, EXTENDED RELEASE ORAL EVERY MORNING
Qty: 180 CAPSULE | Refills: 0 | Status: SHIPPED | OUTPATIENT
Start: 2018-09-19 | End: 2018-11-30 | Stop reason: SDUPTHER

## 2018-09-19 RX ORDER — DEXTROAMPHETAMINE SULFATE, DEXTROAMPHETAMINE SACCHARATE, AMPHETAMINE SULFATE AND AMPHETAMINE ASPARTATE 7.5; 7.5; 7.5; 7.5 MG/1; MG/1; MG/1; MG/1
60 CAPSULE, EXTENDED RELEASE ORAL EVERY MORNING
Qty: 180 CAPSULE | Refills: 0 | Status: SHIPPED | OUTPATIENT
Start: 2018-09-19 | End: 2018-09-19 | Stop reason: SDUPTHER

## 2018-09-19 NOTE — PROGRESS NOTES
FAMILY MEDICINE    Patient Active Problem List   Diagnosis    GERD (gastroesophageal reflux disease)    HTN (hypertension)    ADHD (attention deficit hyperactivity disorder), inattentive type    Chlorine gas exposure    Tobacco abuse    Hyperlipidemia    History of colon polyps       CC:   Chief Complaint   Patient presents with    Medication Refill       SUBJECTIVE:  Nick Mcdonough Sr.   is a 53 y.o. male  - smoker with HTN, HLD, GERD and ADHD presents for refill of Adderal XR 30 mg 2 tabs daily. His provider Giulia Solis has been out of the office. He is tolerating the medication and has been on ADHD treatment since 7 yo. No unwanted side effects.   Still smoking and readiness to quit 2/10 and defers cessation assistance at this time        ROS: Review of Systems   Constitutional: Negative for activity change, appetite change and unexpected weight change.   HENT: Negative for trouble swallowing.    Eyes: Negative for visual disturbance.   Respiratory: Negative for chest tightness and shortness of breath.    Cardiovascular: Positive for leg swelling (chronic). Negative for chest pain and palpitations.   Gastrointestinal: Negative for abdominal pain, constipation and nausea.   Genitourinary: Negative for difficulty urinating.   Musculoskeletal: Positive for myalgias.   Neurological: Negative for dizziness, tremors, light-headedness and headaches.   Psychiatric/Behavioral: Negative for dysphoric mood and sleep disturbance. The patient is not nervous/anxious.        Past Medical History:   Diagnosis Date    Adult ADHD     Alcohol dependency 9/2015    last alcohol intake September 2015    Colon polyp 02/02/2018    2 polyps - tubular adenoma - repeat colonoscopy in 5 years    GERD (gastroesophageal reflux disease)     Hypertension        Past Surgical History:   Procedure Laterality Date    COLONOSCOPY N/A 2/2/2018    Procedure: COLONOSCOPY;  Surgeon: Eduard Medley MD;  Location: [facility];  Service:  "Endoscopy;  Laterality: N/A;    COLONOSCOPY N/A 2/2/2018    Performed by [facility] at [facility]O    HERNIA REPAIR      INGUINAL HERNIA REPAIR Left 2012    done at [facility]    KNEE SURGERY Right     VASECTOMY         ALLERGIES:   Review of patient's allergies indicates:   Allergen Reactions    Pcn [penicillins]        MEDS:   Current Outpatient Medications:     ADDERALL XR 30 mg 24 hr capsule, Take 2 capsules (60 mg total) by mouth every morning., Disp: 180 capsule, Rfl: 0    amLODIPine (NORVASC) 10 MG tablet, Take 1 tablet (10 mg total) by mouth once daily., Disp: 90 tablet, Rfl: 0    doxazosin (CARDURA) 8 MG Tab, Take 1 tablet (8 mg total) by mouth once daily., Disp: 90 tablet, Rfl: 0    esomeprazole (NEXIUM) 40 MG capsule, Take 1 capsule (40 mg total) by mouth once daily., Disp: 90 capsule, Rfl: 0    fish oil-omega-3 fatty acids 300-1,000 mg capsule, Take 1 g by mouth once daily., Disp: , Rfl:     lisinopril (PRINIVIL,ZESTRIL) 40 MG tablet, Take 1 tablet (40 mg total) by mouth once daily., Disp: 90 tablet, Rfl: 0    rosuvastatin (CRESTOR) 10 MG tablet, Take 1 tablet (10 mg total) by mouth once daily., Disp: 90 tablet, Rfl: 0    triamterene-hydrochlorothiazide 37.5-25 mg (MAXZIDE-25) 37.5-25 mg per tablet, Take 1.5 tablets by mouth once daily., Disp: 135 tablet, Rfl: 0    OBJECTIVE:   Vitals:    09/19/18 1554   BP: 120/76   BP Location: Left arm   Patient Position: Sitting   BP Method: Large (Manual)   Pulse: 85   Temp: 98.7 °F (37.1 °C)   TempSrc: Oral   SpO2: 98%   Weight: 116.2 kg (256 lb 3.2 oz)   Height: 5' 10" (1.778 m)     Body mass index is 36.76 kg/m².    Physical Exam   Constitutional: No distress.   Cardiovascular: Normal rate, regular rhythm, normal heart sounds and intact distal pulses. Exam reveals no friction rub.   No murmur heard.  Pulmonary/Chest: Effort normal and breath sounds normal.   Musculoskeletal: He exhibits edema (1+ bilateral pittind edema LE).   Skin: Skin is warm. "   Psychiatric: He has a normal mood and affect. His behavior is normal.         PERTINENT RESULTS:   No visits with results within 1 Month(s) from this visit.   Latest known visit with results is:   Lab Visit on 06/16/2018   Component Date Value Ref Range Status    Sodium 06/16/2018 144  136 - 145 mmol/L Final    Potassium 06/16/2018 3.5  3.5 - 5.1 mmol/L Final    Chloride 06/16/2018 102  95 - 110 mmol/L Final    CO2 06/16/2018 30* 23 - 29 mmol/L Final    Glucose 06/16/2018 102  70 - 110 mg/dL Final    BUN, Bld 06/16/2018 22* 2 - 20 mg/dL Final    Creatinine 06/16/2018 0.94  0.50 - 1.40 mg/dL Final    Calcium 06/16/2018 9.3  8.7 - 10.5 mg/dL Final    Total Protein 06/16/2018 6.9  6.0 - 8.4 g/dL Final    Albumin 06/16/2018 4.0  3.5 - 5.2 g/dL Final    Total Bilirubin 06/16/2018 0.5  0.1 - 1.0 mg/dL Final    Comment: For infants and newborns, interpretation of results should be based  on gestational age, weight and in agreement with clinical  observations.  Premature Infant recommended reference ranges:  Up to 24 hours.............<8.0 mg/dL  Up to 48 hours............<12.0 mg/dL  3-5 days..................<15.0 mg/dL  6-29 days.................<15.0 mg/dL      Alkaline Phosphatase 06/16/2018 81  38 - 126 U/L Final    AST 06/16/2018 38  15 - 46 U/L Final    ALT 06/16/2018 49* 10 - 44 U/L Final    Anion Gap 06/16/2018 12  8 - 16 mmol/L Final    eGFR if African American 06/16/2018 >60.0  >60 mL/min/1.73 m^2 Final    eGFR if non African American 06/16/2018 >60.0  >60 mL/min/1.73 m^2 Final    Comment: Calculation used to obtain the estimated glomerular filtration  rate (eGFR) is the CKD-EPI equation.       Cholesterol 06/16/2018 103* 120 - 199 mg/dL Final    Comment: The National Cholesterol Education Program (NCEP) has set the  following guidelines (reference ranges) for Cholesterol:  Optimal.....................<200 mg/dL  Borderline High.............200-239 mg/dL  High........................> or =  240 mg/dL      Triglycerides 06/16/2018 115  30 - 150 mg/dL Final    Comment: The National Cholesterol Education Program (NCEP) has set the  following guidelines (reference values) for triglycerides:  Normal......................<150 mg/dL  Borderline High.............150-199 mg/dL  High........................200-499 mg/dL      HDL 06/16/2018 26* 40 - 75 mg/dL Final    Comment: The National Cholesterol Education Program (NCEP) has set the  following guidelines (reference values) for HDL Cholesterol:  Low...............<40 mg/dL  Optimal...........>60 mg/dL      LDL Cholesterol 06/16/2018 54.0* 63.0 - 159.0 mg/dL Final    Comment: The National Cholesterol Education Program (NCEP) has set the  following guidelines (reference values) for LDL Cholesterol:  Optimal.......................<130 mg/dL  Borderline High...............130-159 mg/dL  High..........................160-189 mg/dL  Very High.....................>190 mg/dL      HDL/Chol Ratio 06/16/2018 25.2  20.0 - 50.0 % Final    Total Cholesterol/HDL Ratio 06/16/2018 4.0  2.0 - 5.0 Final    Non-HDL Cholesterol 06/16/2018 77  mg/dL Final    Comment: Risk category and Non-HDL cholesterol goals:  Coronary heart disease (CHD)or equivalent (10-year risk of CHD >20%):  Non-HDL cholesterol goal     <130 mg/dL  Two or more CHD risk factors and 10-year risk of CHD <= 20%:  Non-HDL cholesterol goal     <160 mg/dL  0 to 1 CHD risk factor:  Non-HDL cholesterol goal     <190 mg/dL            ASSESSMENT:  Problem List Items Addressed This Visit     ADHD (attention deficit hyperactivity disorder), inattentive type - Primary    Relevant Medications    ADDERALL XR 30 mg 24 hr capsule          PLAN:   Orders Placed This Encounter    Influenza - Quadrivalent (3 years & older) (PF)    ADDERALL XR 30 mg 24 hr capsule     Follow-up with Giulia Solis in 3 months.     Dr. Tiff Fontaine D.O.   [facility]

## 2018-10-17 ENCOUNTER — OFFICE VISIT (OUTPATIENT)
Dept: URGENT CARE | Facility: CLINIC | Age: 53
End: 2018-10-17
Payer: COMMERCIAL

## 2018-10-17 VITALS
BODY MASS INDEX: 36.65 KG/M2 | RESPIRATION RATE: 18 BRPM | OXYGEN SATURATION: 95 % | WEIGHT: 256 LBS | DIASTOLIC BLOOD PRESSURE: 74 MMHG | TEMPERATURE: 99 F | HEIGHT: 70 IN | SYSTOLIC BLOOD PRESSURE: 118 MMHG | HEART RATE: 70 BPM

## 2018-10-17 DIAGNOSIS — J06.9 UPPER RESPIRATORY TRACT INFECTION, UNSPECIFIED TYPE: Primary | ICD-10-CM

## 2018-10-17 DIAGNOSIS — B37.0 THRUSH: ICD-10-CM

## 2018-10-17 DIAGNOSIS — T24.132A SUPERFICIAL BURN OF LEFT LOWER LEG, INITIAL ENCOUNTER: ICD-10-CM

## 2018-10-17 LAB
CTP QC/QA: YES
FLUAV AG NPH QL: NEGATIVE
FLUBV AG NPH QL: NEGATIVE

## 2018-10-17 PROCEDURE — 3078F DIAST BP <80 MM HG: CPT | Mod: CPTII,S$GLB,, | Performed by: FAMILY MEDICINE

## 2018-10-17 PROCEDURE — 3074F SYST BP LT 130 MM HG: CPT | Mod: CPTII,S$GLB,, | Performed by: FAMILY MEDICINE

## 2018-10-17 PROCEDURE — 99214 OFFICE O/P EST MOD 30 MIN: CPT | Mod: S$GLB,,, | Performed by: FAMILY MEDICINE

## 2018-10-17 PROCEDURE — 3008F BODY MASS INDEX DOCD: CPT | Mod: CPTII,S$GLB,, | Performed by: FAMILY MEDICINE

## 2018-10-17 PROCEDURE — 87804 INFLUENZA ASSAY W/OPTIC: CPT | Mod: QW,S$GLB,, | Performed by: FAMILY MEDICINE

## 2018-10-17 RX ORDER — IBUPROFEN 800 MG/1
800 TABLET ORAL 3 TIMES DAILY
Qty: 30 TABLET | Refills: 0 | Status: SHIPPED | OUTPATIENT
Start: 2018-10-17 | End: 2018-10-27

## 2018-10-17 RX ORDER — NYSTATIN 100000 [USP'U]/ML
6 SUSPENSION ORAL 4 TIMES DAILY
Qty: 240 ML | Refills: 0 | Status: SHIPPED | OUTPATIENT
Start: 2018-10-17 | End: 2018-10-27

## 2018-10-17 NOTE — LETTER
October 17, 2018      [facility]  [address] [address], [address]  [place] [address]  Phone: [phone]  Fax: [phone]       Patient: Nick Mcdonough   YOB: 1965  Date of Visit: 10/17/2018    To Whom It May Concern:    Luisito Mcdonough  was at [facility] on 10/17/2018. He may return to work/school on 10/18/18 with no restrictions. If you have any questions or concerns, or if I can be of further assistance, please do not hesitate to contact me.    Sincerely,    Oliverio Lee MD

## 2018-10-17 NOTE — PROGRESS NOTES
"Subjective:       Patient ID: Nick Mcdonough Sr. is a 53 y.o. male.    Vitals:  height is 5' 10" (1.778 m) and weight is 116.1 kg (256 lb). His oral temperature is 99.2 °F (37.3 °C). His blood pressure is 118/74 and his pulse is 70. His respiration is 18 and oxygen saturation is 95%.         Chief Complaint: Sinus Problem    This is a 53 y.o. male who presents today with a chief complaint of body aches, headache on the left side to the front for 2 days. He also c/o "flash burn" on his left leg. He is a .    Got a flu shot - 1 month ago.          Sinus Problem   This is a new problem. The current episode started in the past 7 days (2 days ago). There has been no fever. His pain is at a severity of 10/10. The pain is severe. Associated symptoms include congestion, coughing, headaches and sinus pressure. Pertinent negatives include no chills, ear pain, hoarse voice, shortness of breath or sore throat. Past treatments include acetaminophen. The treatment provided mild relief.     Review of Systems   Constitution: Negative for chills, fever and malaise/fatigue.   HENT: Positive for congestion and sinus pressure. Negative for ear pain, hoarse voice and sore throat.    Eyes: Negative for discharge and redness.   Cardiovascular: Negative for chest pain, dyspnea on exertion and leg swelling.   Respiratory: Positive for cough. Negative for shortness of breath, sputum production and wheezing.    Musculoskeletal: Negative for myalgias.   Gastrointestinal: Negative for abdominal pain and nausea.   Neurological: Positive for headaches.       Objective:      Physical Exam   Constitutional: He is oriented to person, place, and time. He appears well-developed and well-nourished. He is cooperative.  Non-toxic appearance. He does not appear ill. No distress.   HENT:   Head: Normocephalic and atraumatic.   Right Ear: Hearing, tympanic membrane, external ear and ear canal normal.   Left Ear: Hearing, tympanic " membrane, external ear and ear canal normal.   Nose: Nose normal. No mucosal edema, rhinorrhea or nasal deformity. No epistaxis. Right sinus exhibits no maxillary sinus tenderness and no frontal sinus tenderness. Left sinus exhibits no maxillary sinus tenderness and no frontal sinus tenderness.   Mouth/Throat: Uvula is midline, oropharynx is clear and moist and mucous membranes are normal. No trismus in the jaw. Normal dentition. No uvula swelling. No posterior oropharyngeal erythema (thick pnd).       Thick white coated tongue   Eyes: Conjunctivae and lids are normal. No scleral icterus.   Sclera clear bilat   Neck: Trachea normal, full passive range of motion without pain and phonation normal. Neck supple.   Cardiovascular: Normal rate, regular rhythm, normal heart sounds, intact distal pulses and normal pulses.   Pulmonary/Chest: Effort normal and breath sounds normal. No respiratory distress.   Abdominal: Soft. Normal appearance and bowel sounds are normal. He exhibits no distension. There is no tenderness.   Musculoskeletal: Normal range of motion. He exhibits no edema or deformity.   Neurological: He is alert and oriented to person, place, and time. He exhibits normal muscle tone. Coordination normal.   Skin: Skin is warm, dry and intact. He is not diaphoretic. No pallor.        Anterior shin there is a 1at degree burn - erythema - no blistering   Psychiatric: He has a normal mood and affect. His speech is normal and behavior is normal. Judgment and thought content normal. Cognition and memory are normal.   Nursing note and vitals reviewed.      Assessment:       1. Body aches    2. Thrush    3. Superficial burn of left lower leg, initial encounter        Plan:         Body aches  -     POCT Influenza A/B  -     ibuprofen (ADVIL,MOTRIN) 800 MG tablet; Take 1 tablet (800 mg total) by mouth 3 (three) times daily. for 10 days  Dispense: 30 tablet; Refill: 0    Thrush  -     nystatin (MYCOSTATIN) 100,000 unit/mL  suspension; Take 6 mLs (600,000 Units total) by mouth 4 (four) times daily. for 10 days  Dispense: 240 mL; Refill: 0    Superficial burn of left lower leg, initial encounter  -     ibuprofen (ADVIL,MOTRIN) 800 MG tablet; Take 1 tablet (800 mg total) by mouth 3 (three) times daily. for 10 days  Dispense: 30 tablet; Refill: 0          Patient Instructions   If you were prescribed a narcotic or controlled medication, do not drive or operate heavy equipment or machinery while taking these medications.  You must understand that you've received an Urgent Care treatment only and that you may be released before all your medical problems are known or treated. You, the patient, will arrange for follow up care as instructed.  Follow up with your PCP or specialty clinic as directed in the next 1-2 weeks if not improved or as needed.  You can call [phone] to schedule an appointment with the appropriate provider.  If your condition worsens we recommend that you receive another evaluation at the emergency room immediately or contact your primary medical clinics after hours call service to discuss your concerns.  Please return here or go to the Emergency Department for any concerns or worsening of condition.  Take Mucinex D in the morning and Mucinex DM at night.   Mucinex D has 120 mg of Pseudoephedrine in it and can cause palpitations and high blood pressure. If you experience this stop the Mucinex D and only take Mucinex DM (twice a day).

## 2018-10-19 ENCOUNTER — PATIENT MESSAGE (OUTPATIENT)
Dept: FAMILY MEDICINE | Facility: CLINIC | Age: 53
End: 2018-10-19

## 2018-10-19 ENCOUNTER — OFFICE VISIT (OUTPATIENT)
Dept: URGENT CARE | Facility: CLINIC | Age: 53
End: 2018-10-19
Payer: COMMERCIAL

## 2018-10-19 VITALS
HEART RATE: 75 BPM | BODY MASS INDEX: 36.65 KG/M2 | WEIGHT: 256 LBS | SYSTOLIC BLOOD PRESSURE: 126 MMHG | TEMPERATURE: 98 F | HEIGHT: 70 IN | DIASTOLIC BLOOD PRESSURE: 71 MMHG | RESPIRATION RATE: 18 BRPM | OXYGEN SATURATION: 97 %

## 2018-10-19 DIAGNOSIS — R60.0 LEG EDEMA, LEFT: Primary | ICD-10-CM

## 2018-10-19 PROCEDURE — 99214 OFFICE O/P EST MOD 30 MIN: CPT | Mod: S$GLB,,, | Performed by: EMERGENCY MEDICINE

## 2018-10-19 NOTE — TELEPHONE ENCOUNTER
Tried to call patient and no answer - left message as well as sent message over portal that picture suggests cellulitis and should go to Urgent Care or ER today for evaluation and treatment.

## 2018-10-19 NOTE — PROGRESS NOTES
"Subjective:       Patient ID: Nick Mcdonough Sr. is a 53 y.o. male.    Vitals:  height is 5' 10" (1.778 m) and weight is 116.1 kg (256 lb). His temperature is 97.6 °F (36.4 °C). His blood pressure is 126/71 and his pulse is 75. His respiration is 18 and oxygen saturation is 97%.     Chief Complaint: Leg Pain    This is a 53 y.o. male who presents today with a chief complaint of left lower leg redness, swelling & draining for 3 days. Patient noticed the inflammation after starting his new medication, crestor.        Leg Pain    The incident occurred 3 to 5 days ago. The incident occurred at home. There was no injury mechanism. The pain is present in the left leg. The quality of the pain is described as aching. Associated symptoms include tingling. He reports no foreign bodies present. He has tried NSAIDs for the symptoms. The treatment provided mild relief.     Review of Systems   Constitution: Negative for chills and fever.   HENT: Negative for sore throat.    Respiratory: Negative for shortness of breath.    Skin: Negative for itching and rash.        Swelling & redness   Musculoskeletal: Negative for joint pain.   Neurological: Positive for tingling.       Objective:      Physical Exam   Constitutional: He is oriented to person, place, and time. He appears well-developed and well-nourished.   HENT:   Head: Normocephalic and atraumatic.   Neck: Normal range of motion. Neck supple.   Cardiovascular: Normal rate, regular rhythm, normal heart sounds and intact distal pulses.   Pulmonary/Chest: Breath sounds normal.   Musculoskeletal:   Swollen red warm left calf, tender   Neurological: He is alert and oriented to person, place, and time.   Psychiatric: He has a normal mood and affect. His behavior is normal.       Assessment:       1. Leg edema, left        Plan:         Leg edema, left        Peyman Rogers MD  Go to the Emergency Department for any problems  Call your PCP for follow up next available.    "

## 2018-10-21 ENCOUNTER — TELEPHONE (OUTPATIENT)
Dept: URGENT CARE | Facility: CLINIC | Age: 53
End: 2018-10-21

## 2018-11-09 DIAGNOSIS — I10 ESSENTIAL HYPERTENSION: ICD-10-CM

## 2018-11-09 DIAGNOSIS — E78.5 HYPERLIPIDEMIA, UNSPECIFIED HYPERLIPIDEMIA TYPE: ICD-10-CM

## 2018-11-09 DIAGNOSIS — K21.9 GASTROESOPHAGEAL REFLUX DISEASE, ESOPHAGITIS PRESENCE NOT SPECIFIED: ICD-10-CM

## 2018-11-09 RX ORDER — AMLODIPINE BESYLATE 10 MG/1
10 TABLET ORAL DAILY
Qty: 90 TABLET | Refills: 0 | Status: SHIPPED | OUTPATIENT
Start: 2018-11-09 | End: 2019-01-21 | Stop reason: SDUPTHER

## 2018-11-09 RX ORDER — TRIAMTERENE/HYDROCHLOROTHIAZID 37.5-25 MG
1.5 TABLET ORAL DAILY
Qty: 135 TABLET | Refills: 0 | Status: SHIPPED | OUTPATIENT
Start: 2018-11-09 | End: 2019-01-21 | Stop reason: SDUPTHER

## 2018-11-09 RX ORDER — DOXAZOSIN 8 MG/1
8 TABLET ORAL DAILY
Qty: 90 TABLET | Refills: 0 | Status: SHIPPED | OUTPATIENT
Start: 2018-11-09 | End: 2019-01-21 | Stop reason: SDUPTHER

## 2018-11-09 RX ORDER — ROSUVASTATIN CALCIUM 10 MG/1
10 TABLET, COATED ORAL DAILY
Qty: 90 TABLET | Refills: 0 | Status: SHIPPED | OUTPATIENT
Start: 2018-11-09 | End: 2018-11-30

## 2018-11-12 ENCOUNTER — TELEPHONE (OUTPATIENT)
Dept: FAMILY MEDICINE | Facility: CLINIC | Age: 53
End: 2018-11-12

## 2018-11-12 DIAGNOSIS — E78.5 HYPERLIPIDEMIA, UNSPECIFIED HYPERLIPIDEMIA TYPE: ICD-10-CM

## 2018-11-12 DIAGNOSIS — I10 ESSENTIAL HYPERTENSION: Primary | ICD-10-CM

## 2018-11-12 RX ORDER — LISINOPRIL 40 MG/1
40 TABLET ORAL DAILY
Qty: 90 TABLET | Refills: 0 | Status: SHIPPED | OUTPATIENT
Start: 2018-11-12 | End: 2019-01-21 | Stop reason: SDUPTHER

## 2018-11-12 RX ORDER — ESOMEPRAZOLE MAGNESIUM 40 MG/1
40 CAPSULE, DELAYED RELEASE ORAL DAILY
Qty: 90 CAPSULE | Refills: 0 | Status: SHIPPED | OUTPATIENT
Start: 2018-11-12 | End: 2019-01-21 | Stop reason: SDUPTHER

## 2018-11-12 NOTE — TELEPHONE ENCOUNTER
----- Message from Joselyn Castañeda sent at 11/9/2018  4:17 PM CST -----  Patient came in the pharmacy today starting that his insurance will not cover the Brand Adderall anymore,because he is switching insurance plans but we can do a PA for the medication, we will just need a RX , patient states that he has 30 day left if we receive the RX will not fill it until it is time for him fill it, we will just start the PA process before it is time for his refill   Thanks if you have any questions don't hesitate to call.  Joselyn

## 2018-11-16 NOTE — TELEPHONE ENCOUNTER
Staff  -  Call patient and advise Patient must have an appointment before I give him the next prescription - ADHD medications require 3 month visits.  Please call patient and advise him to make an appointment and  come in 1 to 2 weeks before the prescription is due and I can post-date the prescription but give it to him so it will allow pharmacy time to get prior authorization before out of medication but must have visit before I can give next prescription.    ###ALSO patient needs fasting labs PRIOR to the office visit because during his ER visit - his kidney function was acutely decreased so need to make sure that has improved as well as time to check cholesterol so schedule patient appt for fasting labs CMP and Lipid and then visit for results and ADHD refill.

## 2018-11-19 ENCOUNTER — TELEPHONE (OUTPATIENT)
Dept: FAMILY MEDICINE | Facility: CLINIC | Age: 53
End: 2018-11-19

## 2018-11-30 ENCOUNTER — OFFICE VISIT (OUTPATIENT)
Dept: FAMILY MEDICINE | Facility: CLINIC | Age: 53
End: 2018-11-30
Payer: COMMERCIAL

## 2018-11-30 VITALS
HEIGHT: 70 IN | TEMPERATURE: 98 F | HEART RATE: 63 BPM | SYSTOLIC BLOOD PRESSURE: 132 MMHG | RESPIRATION RATE: 16 BRPM | BODY MASS INDEX: 36.35 KG/M2 | WEIGHT: 253.88 LBS | DIASTOLIC BLOOD PRESSURE: 82 MMHG | OXYGEN SATURATION: 97 %

## 2018-11-30 DIAGNOSIS — Z13.1 DIABETES MELLITUS SCREENING: ICD-10-CM

## 2018-11-30 DIAGNOSIS — E78.5 HYPERLIPIDEMIA, UNSPECIFIED HYPERLIPIDEMIA TYPE: ICD-10-CM

## 2018-11-30 DIAGNOSIS — Z72.0 TOBACCO USER: ICD-10-CM

## 2018-11-30 DIAGNOSIS — Z12.5 PROSTATE CANCER SCREENING: ICD-10-CM

## 2018-11-30 DIAGNOSIS — Z13.0 SCREENING, ANEMIA, DEFICIENCY, IRON: ICD-10-CM

## 2018-11-30 DIAGNOSIS — K21.9 GASTROESOPHAGEAL REFLUX DISEASE, ESOPHAGITIS PRESENCE NOT SPECIFIED: ICD-10-CM

## 2018-11-30 DIAGNOSIS — Z13.29 THYROID DISORDER SCREEN: ICD-10-CM

## 2018-11-30 DIAGNOSIS — L03.116 CELLULITIS OF LEFT LOWER LEG: ICD-10-CM

## 2018-11-30 DIAGNOSIS — F90.0 ADHD (ATTENTION DEFICIT HYPERACTIVITY DISORDER), INATTENTIVE TYPE: ICD-10-CM

## 2018-11-30 DIAGNOSIS — I10 ESSENTIAL HYPERTENSION: Primary | ICD-10-CM

## 2018-11-30 PROCEDURE — 87077 CULTURE AEROBIC IDENTIFY: CPT

## 2018-11-30 PROCEDURE — 99214 OFFICE O/P EST MOD 30 MIN: CPT | Mod: S$GLB,,, | Performed by: NURSE PRACTITIONER

## 2018-11-30 PROCEDURE — 87070 CULTURE OTHR SPECIMN AEROBIC: CPT

## 2018-11-30 PROCEDURE — 3008F BODY MASS INDEX DOCD: CPT | Mod: CPTII,S$GLB,, | Performed by: NURSE PRACTITIONER

## 2018-11-30 PROCEDURE — 87186 SC STD MICRODIL/AGAR DIL: CPT

## 2018-11-30 PROCEDURE — 99999 PR PBB SHADOW E&M-EST. PATIENT-LVL V: CPT | Mod: PBBFAC,,, | Performed by: NURSE PRACTITIONER

## 2018-11-30 PROCEDURE — 3075F SYST BP GE 130 - 139MM HG: CPT | Mod: CPTII,S$GLB,, | Performed by: NURSE PRACTITIONER

## 2018-11-30 PROCEDURE — 3079F DIAST BP 80-89 MM HG: CPT | Mod: CPTII,S$GLB,, | Performed by: NURSE PRACTITIONER

## 2018-11-30 RX ORDER — DEXTROAMPHETAMINE SULFATE, DEXTROAMPHETAMINE SACCHARATE, AMPHETAMINE SULFATE AND AMPHETAMINE ASPARTATE 7.5; 7.5; 7.5; 7.5 MG/1; MG/1; MG/1; MG/1
60 CAPSULE, EXTENDED RELEASE ORAL EVERY MORNING
Qty: 180 CAPSULE | Refills: 0 | Status: SHIPPED | OUTPATIENT
Start: 2018-11-30 | End: 2019-03-15 | Stop reason: SDUPTHER

## 2018-11-30 RX ORDER — GLUCOSAMINE/CHONDR SU A SOD 167-133 MG
500 CAPSULE ORAL NIGHTLY
Qty: 90 TABLET | Refills: 1 | Status: SHIPPED | OUTPATIENT
Start: 2018-11-30 | End: 2023-12-14 | Stop reason: SDUPTHER

## 2018-11-30 RX ORDER — SULFAMETHOXAZOLE AND TRIMETHOPRIM 800; 160 MG/1; MG/1
1 TABLET ORAL 2 TIMES DAILY
Qty: 20 TABLET | Refills: 0 | Status: SHIPPED | OUTPATIENT
Start: 2018-11-30 | End: 2018-11-30 | Stop reason: SDUPTHER

## 2018-11-30 RX ORDER — SULFAMETHOXAZOLE AND TRIMETHOPRIM 800; 160 MG/1; MG/1
1 TABLET ORAL 2 TIMES DAILY
Qty: 20 TABLET | Refills: 0 | Status: SHIPPED | OUTPATIENT
Start: 2018-11-30 | End: 2018-12-10

## 2018-11-30 RX ORDER — PRAVASTATIN SODIUM 20 MG/1
20 TABLET ORAL DAILY
Qty: 90 TABLET | Refills: 1 | Status: SHIPPED | OUTPATIENT
Start: 2018-11-30 | End: 2019-05-02

## 2018-11-30 NOTE — PROGRESS NOTES
"Subjective:       Patient ID: Nick Mcdonough Sr. is a 53 y.o. male.    Chief Complaint: Medication Refill    Patient is a 52-year-old white male with hypertension, GERD, ADHD, tobacco user, fluid retention to lower extremities with venous stasis dermatitis , and now on hyperlipidemia medication that is here today for follow-up with fasting lab results.    Patient has hypertension that is controlled on present medications, Lisinopril 40 mg daily and Maxzide 25 mg 1 1/2 tablets daily.  /82   Pulse 63   Temp 98.4 °F (36.9 °C) (Oral)   Resp 16   Ht 5' 10" (1.778 m)   Wt 115.2 kg (253 lb 13.8 oz)   SpO2 97%   BMI 36.43 kg/m²     Patient has GERD that is controlled on Nexium at present dose.     Patient has ADHD that is stable on current regimen.  Patient is able to focus and complete tasks effectively.  No side effects reported.     Patient is a smoker and declines smoking cessation program.     Patient has history of hyperlipidemia.  However due to patient age of 52, hypertension, smoker, his 10 year risk was greater than 10% so was started on Crestor 10 mg daily in March 2018.  Cholesterol levels were improved and LDL was 54.0 in June 2018 but patient reports that the Crestor caused acute itching so stopped medication.  We will need to trial a different medication.  HDL is chronically low and declines smoking cessation so will trial a low dose pravastatin with Niacin at bedtime.      Patient had ACUTE CELLULITIS of the left lower leg and went to ER on 10/19/2018. Patient with an erythematous swollen left leg.  History of injury to this leg.  X-ray only shows soft tissue swelling, DVT study negative.  Likely cellulitis.  Started on broad-spectrum antibiotics - Clindamycin from the ER.  Patient did not return here for follow up until now.  He reports he still has a small 2mm opening to the lower leg that has some drainage and swelling present.  See picture below under physical exam for further info.  " Patient did have ACUTE RENAL function changes in October with Cellulitis that has improved with current labs.                Component      Latest Ref Rng & Units 11/26/2018 10/19/2018 6/16/2018 3/12/2018   Sodium      136 - 145 mmol/L 141 140 144 142   Potassium      3.5 - 5.1 mmol/L 3.3 (L) 3.5 3.5 3.8   Chloride      95 - 110 mmol/L 107 104 102 109   CO2      23 - 29 mmol/L 25 23 30 (H) 23   Glucose      70 - 110 mg/dL 87 99 102 91   BUN, Bld      2 - 20 mg/dL 19 38 (H) 22 (H) 16   Creatinine      0.50 - 1.40 mg/dL 1.05 1.60 (H) 0.94 0.79   Calcium      8.7 - 10.5 mg/dL 9.5 9.3 9.3 9.2   Total Protein      6.0 - 8.4 g/dL 7.4 7.8 6.9 7.1   Albumin      3.5 - 5.2 g/dL 4.3 4.4 4.0 4.1   Total Bilirubin      0.1 - 1.0 mg/dL 0.5 0.5 0.5 0.3   Alkaline Phosphatase      38 - 126 U/L 84 74 81 96   AST      15 - 46 U/L 40 27 38 32   ALT      10 - 44 U/L 47 (H) 28 49 (H) 47 (H)   Anion Gap      8 - 16 mmol/L 9 13 12 10   eGFR if African American      >60 mL/min/1.73 m:2 >60.0 56.4 (A) >60.0 >60.0   eGFR if non African American      >60 mL/min/1.73 m:2 >60.0 48.8 (A) >60.0 >60.0   Cholesterol      120 - 199 mg/dL 175  103 (L) 154   Triglycerides      30 - 150 mg/dL 135  115 149   HDL      40 - 75 mg/dL 35 (L)  26 (L) 30 (L)   LDL Cholesterol      63.0 - 159.0 mg/dL 113.0  54.0 (L) 94.2   HDL/Chol Ratio      20.0 - 50.0 % 20.0  25.2 19.5 (L)   Total Cholesterol/HDL Ratio      2.0 - 5.0 5.0  4.0 5.1 (H)   Non-HDL Cholesterol      mg/dL 140  77 124     Current Outpatient Medications   Medication Sig Dispense Refill    ADDERALL XR 30 mg 24 hr capsule Take 2 capsules (60 mg total) by mouth every morning. 180 capsule 0    amLODIPine (NORVASC) 10 MG tablet Take 1 tablet (10 mg total) by mouth once daily. 90 tablet 0    doxazosin (CARDURA) 8 MG Tab Take 1 tablet (8 mg total) by mouth once daily. 90 tablet 0    esomeprazole (NEXIUM) 40 MG capsule Take 1 capsule (40 mg total) by mouth once daily. 90 capsule 0    fish oil-omega-3  fatty acids 300-1,000 mg capsule Take 1 g by mouth once daily.      lisinopril (PRINIVIL,ZESTRIL) 40 MG tablet Take 1 tablet (40 mg total) by mouth once daily. 90 tablet 0    triamterene-hydrochlorothiazide 37.5-25 mg (MAXZIDE-25) 37.5-25 mg per tablet Take 1 and 1/2 tablets by mouth once daily. 135 tablet 0     No current facility-administered medications for this visit.        Past Medical History:   Diagnosis Date    Adult ADHD     Alcohol dependency 2015    last alcohol intake 2015    Colon polyp 2018    2 polyps - tubular adenoma - repeat colonoscopy in 5 years    GERD (gastroesophageal reflux disease)     Hyperlipidemia     Hypertension        Past Surgical History:   Procedure Laterality Date    COLONOSCOPY N/A 2018    Procedure: COLONOSCOPY;  Surgeon: Eduard Medley MD;  Location: [facility];  Service: Endoscopy;  Laterality: N/A;    COLONOSCOPY N/A 2018    Performed by [facility] at [facility]    HERNIA REPAIR      INGUINAL HERNIA REPAIR Left     [facility] at [facility]    KNEE SURGERY Right     VASECTOMY         Family History   Problem Relation Age of Onset    Hypertension Father     Heart disease Brother         acute MI -  at at 46    Cancer Maternal Grandmother     No Known Problems Maternal Grandfather     Heart disease Paternal Grandmother     Alcohol abuse Paternal Grandfather        Social History     Socioeconomic History    Marital status:      Spouse name: None    Number of children: None    Years of education: None    Highest education level: None   Social Needs    Financial resource strain: None    Food insecurity - worry: None    Food insecurity - inability: None    Transportation needs - medical: None    Transportation needs - non-medical: None   Occupational History    None   Tobacco Use    Smoking status: Current Every Day Smoker     Packs/day: 1.00     Years: 15.00     Pack years: 15.00    Smokeless tobacco: Never  "Used   Substance and Sexual Activity    Alcohol use: No    Drug use: No    Sexual activity: Yes     Partners: Female   Other Topics Concern    None   Social History Narrative    None       Review of Systems   Constitutional: Negative for activity change, appetite change, fatigue, fever and unexpected weight change.   HENT: Negative for congestion, ear pain, mouth sores, nosebleeds, postnasal drip, rhinorrhea, sinus pressure, sneezing, sore throat, trouble swallowing and voice change.    Eyes: Negative.    Respiratory: Negative for cough, chest tightness and shortness of breath.    Cardiovascular: Negative for chest pain, palpitations and leg swelling.   Gastrointestinal: Negative.  Negative for abdominal pain, blood in stool, constipation, diarrhea, nausea and vomiting.   Endocrine: Negative.    Genitourinary: Negative for difficulty urinating, dysuria, flank pain, hematuria and urgency.   Musculoskeletal: Negative for arthralgias, back pain, gait problem, joint swelling, myalgias and neck pain.   Skin: Positive for wound. Negative for color change and rash.        Left lower leg cellulitis treated at ER 10/19/2018 - much improved but still has opening with drainage.   Allergic/Immunologic: Negative for immunocompromised state.   Neurological: Negative for dizziness, tremors, seizures, syncope, speech difficulty and headaches.   Hematological: Negative for adenopathy. Does not bruise/bleed easily.   Psychiatric/Behavioral: Negative for behavioral problems, dysphoric mood, sleep disturbance and suicidal ideas. The patient is not nervous/anxious.          Objective:     Vitals:    11/30/18 1514 11/30/18 1540   BP: (!) 138/92 132/82   Pulse: 63    Resp: 16    Temp: 98.4 °F (36.9 °C)    TempSrc: Oral    SpO2: 97%    Weight: 115.2 kg (253 lb 13.8 oz)    Height: 5' 10" (1.778 m)           Physical Exam   Constitutional: He is oriented to person, place, and time. He appears well-developed and well-nourished. No " distress.   + obesity with Body mass index is 36.43 kg/m².   HENT:   Head: Normocephalic.   Right Ear: External ear normal.   Left Ear: External ear normal.   Nose: Nose normal.   Mouth/Throat: Oropharynx is clear and moist. No oropharyngeal exudate.   Eyes: EOM are normal. Pupils are equal, round, and reactive to light. Right eye exhibits no discharge. Left eye exhibits no discharge. No scleral icterus.   Neck: Normal range of motion. Neck supple. No JVD present. No tracheal deviation present. No thyromegaly present.   Cardiovascular: Normal rate, regular rhythm and normal heart sounds.   No murmur heard.  Pulmonary/Chest: Effort normal and breath sounds normal. No stridor. No respiratory distress.   Abdominal: Soft. He exhibits no distension and no mass. There is no guarding.   Musculoskeletal: Normal range of motion.        Legs:  @ 2mm scabbed opening with pinpoint hole that is draining a serous discharge on compression with around 2 cm of surrounding redness and mild swelling present and skin warm to touch. See picture below.  Drainage was collected and sent off for culture.   Lymphadenopathy:     He has no cervical adenopathy.   Neurological: He is alert and oriented to person, place, and time. Coordination normal.   Skin: Skin is warm and dry. No rash noted. He is not diaphoretic.   Psychiatric: He has a normal mood and affect. His behavior is normal.             Assessment:         ICD-10-CM ICD-9-CM   1. Essential hypertension I10 401.9   2. Hyperlipidemia, unspecified hyperlipidemia type E78.5 272.4   3. ADHD (attention deficit hyperactivity disorder), inattentive type F90.0 314.00   4. Cellulitis of left lower leg L03.116 682.6   5. Gastroesophageal reflux disease, esophagitis presence not specified K21.9 530.81   6. Tobacco user Z72.0 305.1   7. Prostate cancer screening Z12.5 V76.44   8. Thyroid disorder screen Z13.29 V77.0   9. Screening, anemia, deficiency, iron Z13.0 V78.0   10. Diabetes mellitus  screening Z13.1 V77.1       Plan:       Essential hypertension  -  Continue present medications and recheck in 3 months.    Hyperlipidemia, unspecified hyperlipidemia type  -  Stopped the Rosuvastatin due to itching.  Will try to do a low dose Pravastatin 20 mg at bedtime with Niacin 500 mg at bedtime to try to raise the HDL and recheck labs in March when due for WELLNESS/Biometric screen  -     pravastatin (PRAVACHOL) 20 MG tablet; Take 1 tablet (20 mg total) by mouth once daily.  Dispense: 90 tablet; Refill: 1  -     niacin 500 MG Tab; Take 1 tablet (500 mg total) by mouth every evening.  Dispense: 90 tablet; Refill: 1  -     Lipid panel; Future; Expected date: 03/04/2019    ADHD (attention deficit hyperactivity disorder), inattentive type  -  Controlled on present medication.  -     ADDERALL XR 30 mg 24 hr capsule; Take 2 capsules (60 mg total) by mouth every morning.  Dispense: 180 capsule; Refill: 0    Cellulitis of left lower leg  -  Bactrim sent to pharmacy.  Wound culture collected.  Advised on home wound care and if it is not healed and improving by end of antibiotics - advised patient he must call me - I may need to refer to wound care for further management.  -     Aerobic culture  -     sulfamethoxazole-trimethoprim 800-160mg (BACTRIM DS) 800-160 mg Tab; Take 1 tablet by mouth 2 (two) times daily. for 10 days  Dispense: 20 tablet; Refill: 0    Gastroesophageal reflux disease, esophagitis presence not specified    Tobacco user  --  Refused smoking cessation program.    Prostate cancer screening  -     PSA, Screening; Future; Expected date: 03/04/2019    Thyroid disorder screen  -     TSH; Future; Expected date: 03/04/2019    Screening, anemia, deficiency, iron  -     CBC auto differential; Future; Expected date: 03/04/2019    Diabetes mellitus screening  -     Comprehensive metabolic panel; Future; Expected date: 03/04/2019      Follow-up in about 3 months (around 3/12/2019) for fasting labs and WELLNESS  EXAM.        Medication List           Accurate as of 11/30/18  9:57 PM. If you have any questions, ask your nurse or doctor.               START taking these medications    niacin 500 MG Tab  Take 1 tablet (500 mg total) by mouth every evening.  Started by:  Giulia Solis NP     pravastatin 20 MG tablet  Commonly known as:  PRAVACHOL  Take 1 tablet (20 mg total) by mouth once daily.  Started by:  Giulia Solis NP     sulfamethoxazole-trimethoprim 800-160mg 800-160 mg Tab  Commonly known as:  BACTRIM DS  Take 1 tablet by mouth 2 (two) times daily. for 10 days  Started by:  Giulia Solis NP        CONTINUE taking these medications    ADDERALL XR 30 MG 24 hr capsule  Generic drug:  dextroamphetamine-amphetamine  Take 2 capsules (60 mg total) by mouth every morning.     amLODIPine 10 MG tablet  Commonly known as:  NORVASC  Take 1 tablet (10 mg total) by mouth once daily.     doxazosin 8 MG Tab  Commonly known as:  CARDURA  Take 1 tablet (8 mg total) by mouth once daily.     esomeprazole 40 MG capsule  Commonly known as:  NEXIUM  Take 1 capsule (40 mg total) by mouth once daily.     fish oil-omega-3 fatty acids 300-1,000 mg capsule     lisinopril 40 MG tablet  Commonly known as:  PRINIVIL,ZESTRIL  Take 1 tablet (40 mg total) by mouth once daily.     triamterene-hydrochlorothiazide 37.5-25 mg 37.5-25 mg per tablet  Commonly known as:  MAXZIDE-25  Take 1 and 1/2 tablets by mouth once daily.        STOP taking these medications    rosuvastatin 10 MG tablet  Commonly known as:  CRESTOR  Stopped by:  Giulia Solis NP           Where to Get Your Medications      These medications were sent to [facility] [place] [address] [address]    Hours:  Mon-Fri, 8a-5:30p Phone:  [phone]   · niacin 500 MG Tab  · pravastatin 20 MG tablet     [facility] [id number] [place] [address] [place] [address] [address] [facility] [address] & [facility]  [address] [address]  [address]    Phone:  [phone]   · sulfamethoxazole-trimethoprim 800-160mg 800-160 mg Tab     You can get these medications from any pharmacy    Bring a paper prescription for each of these medications  · ADDERALL XR 30 MG 24 hr capsule

## 2018-12-04 LAB — BACTERIA SPEC AEROBE CULT: NORMAL

## 2018-12-17 ENCOUNTER — TELEPHONE (OUTPATIENT)
Dept: FAMILY MEDICINE | Facility: CLINIC | Age: 53
End: 2018-12-17

## 2018-12-17 NOTE — TELEPHONE ENCOUNTER
Hi Mac,     Your would culture shows that the bacteria will be sensitive to the Bactrim prescribed so make sure to take antibiotic until completed.     BLANE Platt     \        Called patient left message for patient.

## 2019-01-21 DIAGNOSIS — I10 ESSENTIAL HYPERTENSION: ICD-10-CM

## 2019-01-21 DIAGNOSIS — E78.5 HYPERLIPIDEMIA, UNSPECIFIED HYPERLIPIDEMIA TYPE: ICD-10-CM

## 2019-01-21 DIAGNOSIS — K21.9 GASTROESOPHAGEAL REFLUX DISEASE, ESOPHAGITIS PRESENCE NOT SPECIFIED: ICD-10-CM

## 2019-01-21 RX ORDER — AMLODIPINE BESYLATE 10 MG/1
10 TABLET ORAL DAILY
Qty: 90 TABLET | Refills: 0 | Status: CANCELLED | OUTPATIENT
Start: 2019-01-21

## 2019-01-21 RX ORDER — DOXAZOSIN 8 MG/1
8 TABLET ORAL DAILY
Qty: 90 TABLET | Refills: 0 | OUTPATIENT
Start: 2019-01-21

## 2019-01-21 RX ORDER — DOXAZOSIN 8 MG/1
8 TABLET ORAL DAILY
Qty: 90 TABLET | Refills: 0 | Status: CANCELLED | OUTPATIENT
Start: 2019-01-21

## 2019-01-21 RX ORDER — TRIAMTERENE/HYDROCHLOROTHIAZID 37.5-25 MG
1.5 TABLET ORAL DAILY
Qty: 135 TABLET | Refills: 0 | OUTPATIENT
Start: 2019-01-21 | End: 2020-01-21

## 2019-01-21 RX ORDER — ROSUVASTATIN CALCIUM 10 MG/1
10 TABLET, COATED ORAL DAILY
Qty: 90 TABLET | Refills: 0 | Status: CANCELLED | OUTPATIENT
Start: 2019-01-21 | End: 2020-01-21

## 2019-01-21 RX ORDER — AMLODIPINE BESYLATE 10 MG/1
10 TABLET ORAL DAILY
Qty: 90 TABLET | Refills: 0 | OUTPATIENT
Start: 2019-01-21

## 2019-01-21 RX ORDER — DOXAZOSIN 8 MG/1
8 TABLET ORAL DAILY
Qty: 90 TABLET | Refills: 0 | Status: SHIPPED | OUTPATIENT
Start: 2019-01-21 | End: 2019-05-02

## 2019-01-21 RX ORDER — TRIAMTERENE/HYDROCHLOROTHIAZID 37.5-25 MG
1.5 TABLET ORAL DAILY
Qty: 135 TABLET | Refills: 0 | Status: SHIPPED | OUTPATIENT
Start: 2019-01-21 | End: 2019-05-02

## 2019-01-21 RX ORDER — ESOMEPRAZOLE MAGNESIUM 40 MG/1
40 CAPSULE, DELAYED RELEASE ORAL DAILY
Qty: 90 CAPSULE | Refills: 0 | Status: SHIPPED | OUTPATIENT
Start: 2019-01-21 | End: 2019-05-02

## 2019-01-21 RX ORDER — LISINOPRIL 40 MG/1
40 TABLET ORAL DAILY
Qty: 90 TABLET | Refills: 0 | Status: SHIPPED | OUTPATIENT
Start: 2019-01-21 | End: 2019-05-02

## 2019-01-21 RX ORDER — AMLODIPINE BESYLATE 10 MG/1
10 TABLET ORAL DAILY
Qty: 90 TABLET | Refills: 0 | Status: SHIPPED | OUTPATIENT
Start: 2019-01-21 | End: 2019-05-02

## 2019-03-15 ENCOUNTER — OFFICE VISIT (OUTPATIENT)
Dept: FAMILY MEDICINE | Facility: CLINIC | Age: 54
End: 2019-03-15
Payer: COMMERCIAL

## 2019-03-15 VITALS
OXYGEN SATURATION: 96 % | HEART RATE: 69 BPM | SYSTOLIC BLOOD PRESSURE: 130 MMHG | TEMPERATURE: 98 F | HEIGHT: 70 IN | WEIGHT: 252 LBS | DIASTOLIC BLOOD PRESSURE: 78 MMHG | RESPIRATION RATE: 18 BRPM | BODY MASS INDEX: 36.08 KG/M2

## 2019-03-15 DIAGNOSIS — Z72.0 TOBACCO USER: ICD-10-CM

## 2019-03-15 DIAGNOSIS — Z00.00 ANNUAL PHYSICAL EXAM: Primary | ICD-10-CM

## 2019-03-15 DIAGNOSIS — E78.5 HYPERLIPIDEMIA, UNSPECIFIED HYPERLIPIDEMIA TYPE: ICD-10-CM

## 2019-03-15 DIAGNOSIS — F90.0 ADHD (ATTENTION DEFICIT HYPERACTIVITY DISORDER), INATTENTIVE TYPE: ICD-10-CM

## 2019-03-15 DIAGNOSIS — I10 ESSENTIAL HYPERTENSION: ICD-10-CM

## 2019-03-15 DIAGNOSIS — E66.01 SEVERE OBESITY (BMI 35.0-35.9 WITH COMORBIDITY): ICD-10-CM

## 2019-03-15 DIAGNOSIS — K21.9 GASTROESOPHAGEAL REFLUX DISEASE, ESOPHAGITIS PRESENCE NOT SPECIFIED: ICD-10-CM

## 2019-03-15 PROCEDURE — 3075F PR MOST RECENT SYSTOLIC BLOOD PRESS GE 130-139MM HG: ICD-10-PCS | Mod: CPTII,S$GLB,, | Performed by: NURSE PRACTITIONER

## 2019-03-15 PROCEDURE — 3078F DIAST BP <80 MM HG: CPT | Mod: CPTII,S$GLB,, | Performed by: NURSE PRACTITIONER

## 2019-03-15 PROCEDURE — 99999 PR PBB SHADOW E&M-EST. PATIENT-LVL IV: ICD-10-PCS | Mod: PBBFAC,,, | Performed by: NURSE PRACTITIONER

## 2019-03-15 PROCEDURE — 99396 PREV VISIT EST AGE 40-64: CPT | Mod: S$GLB,,, | Performed by: NURSE PRACTITIONER

## 2019-03-15 PROCEDURE — 3078F PR MOST RECENT DIASTOLIC BLOOD PRESSURE < 80 MM HG: ICD-10-PCS | Mod: CPTII,S$GLB,, | Performed by: NURSE PRACTITIONER

## 2019-03-15 PROCEDURE — 99396 PR PREVENTIVE VISIT,EST,40-64: ICD-10-PCS | Mod: S$GLB,,, | Performed by: NURSE PRACTITIONER

## 2019-03-15 PROCEDURE — 3075F SYST BP GE 130 - 139MM HG: CPT | Mod: CPTII,S$GLB,, | Performed by: NURSE PRACTITIONER

## 2019-03-15 PROCEDURE — 99999 PR PBB SHADOW E&M-EST. PATIENT-LVL IV: CPT | Mod: PBBFAC,,, | Performed by: NURSE PRACTITIONER

## 2019-03-15 RX ORDER — DEXTROAMPHETAMINE SULFATE, DEXTROAMPHETAMINE SACCHARATE, AMPHETAMINE SULFATE AND AMPHETAMINE ASPARTATE 7.5; 7.5; 7.5; 7.5 MG/1; MG/1; MG/1; MG/1
60 CAPSULE, EXTENDED RELEASE ORAL EVERY MORNING
Qty: 180 CAPSULE | Refills: 0 | Status: SHIPPED | OUTPATIENT
Start: 2019-03-15 | End: 2019-06-27 | Stop reason: SDUPTHER

## 2019-03-15 NOTE — PROGRESS NOTES
"Subjective:       Patient ID: Nick Mcdonough Sr. is a 53 y.o. male.    Chief Complaint: Annual Exam and Medication Refill    Patient is a 52-year-old white male with hypertension, GERD, ADHD, tobacco user, fluid retention to lower extremities with venous stasis dermatitis , and Hyperlipidemia that is here today for annual physical exam with fasting lab results.     Patient has hypertension that is controlled on present medications, Lisinopril 40 mg daily and Maxzide 25 mg 1 1/2 tablets daily.  /78   Pulse 69   Temp 98.3 °F (36.8 °C) (Oral)   Resp 18   Ht 5' 10" (1.778 m)   Wt 114.3 kg (252 lb)   SpO2 96%   BMI 36.16 kg/m²      Patient has GERD that is controlled on Nexium at present dose.     Patient has ADHD that is stable on current regimen.  Patient is able to focus and complete tasks effectively.  No side effects reported.     Patient is a smoker and declines smoking cessation program.     Patient has history of hyperlipidemia. Due to patient age of 52, hypertension, smoker, his 10 year risk was greater than 10% so was started on Crestor 10 mg daily in March 2018.  Cholesterol levels were improved and LDL was 54.0 in June 2018 but patient reports that the Crestor caused acute itching so stopped medication.  HDL is chronically low and declines smoking cessation so at last visit, decided to trial a low dose pravastatin with Niacin at bedtime.  The total cholesterol and LDL are controlled but the triglycerides are high at 243 with low HDL 29.      Patient is severely obese with Body mass index  36.16 kg/m².    Wellness labs:  -  CBC within acceptable range  -  CMP within acceptable range and kidney and liver function are within normal range  -  total cholesterol and LDL are controlled on present medication triglycerides are high with low HDL - see notes above  -  PSA levels normal  -  thyroid screening is normal    Health maintenance:  -  up-to-date              Component      Latest Ref Rng & Units " 3/9/2019 11/26/2018 6/16/2018 3/12/2018   WBC      3.90 - 12.70 K/uL 8.71   6.50   RBC      4.60 - 6.20 M/uL 4.90   4.83   Hemoglobin      14.0 - 18.0 g/dL 15.6   15.3   Hematocrit      40.0 - 54.0 % 46.0   44.3   MCV      82 - 98 fL 94   92   MCH      27.0 - 31.0 pg 31.8 (H)   31.7 (H)   MCHC      32.0 - 36.0 g/dL 33.9   34.5   RDW      11.5 - 14.5 % 14.9 (H)   15.0 (H)   Platelets      150 - 350 K/uL 237   260   MPV      9.2 - 12.9 fL 11.0   10.9   Gran # (ANC)      1.8 - 7.7 K/uL 5.3   3.6   Lymph #      1.0 - 4.8 K/uL 2.1   1.9   Mono #      0.3 - 1.0 K/uL 0.9   0.6   Eos #      0.0 - 0.5 K/uL 0.3   0.3   Baso #      0.00 - 0.20 K/uL 0.05   0.06   Gran%      38.0 - 73.0 % 61.0   55.3   Lymph%      18.0 - 48.0 % 24.5   29.7   Mono%      4.0 - 15.0 % 10.2   8.9   Eosinophil%      0.0 - 8.0 % 3.7   5.2   Basophil%      0.0 - 1.9 % 0.6   0.9   Differential Method       Automated   Automated   Sodium      136 - 145 mmol/L 141 141 144 142   Potassium      3.5 - 5.1 mmol/L 3.7 3.3 (L) 3.5 3.8   Chloride      95 - 110 mmol/L 103 107 102 109   CO2      23 - 29 mmol/L 26 25 30 (H) 23   Glucose      70 - 110 mg/dL 107 87 102 91   BUN, Bld      2 - 20 mg/dL 22 (H) 19 22 (H) 16   Creatinine      0.50 - 1.40 mg/dL 0.94 1.05 0.94 0.79   Calcium      8.7 - 10.5 mg/dL 9.2 9.5 9.3 9.2   Total Protein      6.0 - 8.4 g/dL 7.3 7.4 6.9 7.1   Albumin      3.5 - 5.2 g/dL 4.2 4.3 4.0 4.1   Total Bilirubin      0.1 - 1.0 mg/dL 0.4 0.5 0.5 0.3   Alkaline Phosphatase      38 - 126 U/L 96 84 81 96   AST      15 - 46 U/L 35 40 38 32   ALT      10 - 44 U/L 43 47 (H) 49 (H) 47 (H)   Anion Gap      8 - 16 mmol/L 12 9 12 10   eGFR if African American      >60 mL/min/1.73 m:2 >60.0 >60.0 >60.0 >60.0   eGFR if non African American      >60 mL/min/1.73 m:2 >60.0 >60.0 >60.0 >60.0   Cholesterol      120 - 199 mg/dL 137 175 103 (L) 154   Triglycerides      30 - 150 mg/dL 243 (H) 135 115 149   HDL      40 - 75 mg/dL 29 (L) 35 (L) 26 (L) 30 (L)   LDL  Cholesterol      63.0 - 159.0 mg/dL 59.4 (L) 113.0 54.0 (L) 94.2   HDL/Chol Ratio      20.0 - 50.0 % 21.2 20.0 25.2 19.5 (L)   Total Cholesterol/HDL Ratio      2.0 - 5.0 4.7 5.0 4.0 5.1 (H)   Non-HDL Cholesterol      mg/dL 108 140 77 124   TSH      0.400 - 4.000 uIU/mL 2.580   2.480   PSA, SCREEN      0.00 - 4.00 ng/mL 0.83   0.60       Current Outpatient Medications   Medication Sig Dispense Refill    ADDERALL XR 30 mg 24 hr capsule Take 2 capsules (60 mg total) by mouth every morning. 180 capsule 0    amLODIPine (NORVASC) 10 MG tablet Take 1 tablet (10 mg total) by mouth once daily. 90 tablet 0    doxazosin (CARDURA) 8 MG Tab Take 1 tablet (8 mg total) by mouth once daily. 90 tablet 0    esomeprazole (NEXIUM) 40 MG capsule Take 1 capsule (40 mg total) by mouth once daily. 90 capsule 0    fish oil-omega-3 fatty acids 300-1,000 mg capsule Take 1 g by mouth once daily.      lisinopril (PRINIVIL,ZESTRIL) 40 MG tablet Take 1 tablet (40 mg total) by mouth once daily. 90 tablet 0    niacin 500 MG Tab Take 1 tablet (500 mg total) by mouth every evening. 90 tablet 1    pravastatin (PRAVACHOL) 20 MG tablet Take 1 tablet (20 mg total) by mouth once daily. 90 tablet 1    triamterene-hydrochlorothiazide 37.5-25 mg (MAXZIDE-25) 37.5-25 mg per tablet Take 1 and 1/2 tablets by mouth once daily. 135 tablet 0     No current facility-administered medications for this visit.        Past Medical History:   Diagnosis Date    Adult ADHD     Alcohol dependency 9/2015    last alcohol intake September 2015    Colon polyp 02/02/2018    2 polyps - tubular adenoma - repeat colonoscopy in 5 years    GERD (gastroesophageal reflux disease)     Hyperlipidemia     Hypertension        Past Surgical History:   Procedure Laterality Date    COLONOSCOPY N/A 2/2/2018    [facility]    HERNIA REPAIR      INGUINAL HERNIA REPAIR Left 2012    [facility]    KNEE SURGERY Right     VASECTOMY          Family History   Problem Relation Age of Onset    Hypertension Father     Heart disease Brother         acute MI -  at at 46    Cancer Maternal Grandmother     No Known Problems Maternal Grandfather     Heart disease Paternal Grandmother     Alcohol abuse Paternal Grandfather        Social History     Socioeconomic History    Marital status:      Spouse name: None    Number of children: None    Years of education: None    Highest education level: None   Social Needs    Financial resource strain: None    Food insecurity - worry: None    Food insecurity - inability: None    Transportation needs - medical: None    Transportation needs - non-medical: None   Occupational History    None   Tobacco Use    Smoking status: Current Every Day Smoker     Packs/day: 1.00     Years: 15.00     Pack years: 15.00    Smokeless tobacco: Never Used   Substance and Sexual Activity    Alcohol use: No    Drug use: No    Sexual activity: Yes     Partners: Female   Other Topics Concern    None   Social History Narrative    None       Review of Systems   Constitutional: Negative for activity change, appetite change, fatigue, fever and unexpected weight change.   HENT: Negative for congestion, ear pain, mouth sores, nosebleeds, postnasal drip, rhinorrhea, sinus pressure, sneezing, sore throat, trouble swallowing and voice change.    Eyes: Negative.    Respiratory: Negative for cough, chest tightness and shortness of breath.    Cardiovascular: Negative for chest pain, palpitations and leg swelling.   Gastrointestinal: Negative.  Negative for abdominal pain, blood in stool, constipation, diarrhea, nausea and vomiting.   Endocrine: Negative.    Genitourinary: Negative for difficulty urinating, dysuria, flank pain, hematuria and urgency.   Musculoskeletal: Negative for arthralgias, back pain, gait problem, joint swelling, myalgias and neck pain.   Skin: Negative for color change, rash and wound.  "  Allergic/Immunologic: Negative for immunocompromised state.   Neurological: Negative for dizziness, tremors, seizures, syncope, speech difficulty and headaches.   Hematological: Negative for adenopathy. Does not bruise/bleed easily.   Psychiatric/Behavioral: Negative for behavioral problems, dysphoric mood, sleep disturbance and suicidal ideas. The patient is not nervous/anxious.          Objective:     Vitals:    03/15/19 1507   BP: 130/78   Pulse: 69   Resp: 18   Temp: 98.3 °F (36.8 °C)   TempSrc: Oral   SpO2: 96%   Weight: 114.3 kg (252 lb)   Height: 5' 10" (1.778 m)          Physical Exam   Constitutional: He is oriented to person, place, and time. He appears well-developed and well-nourished.   + obesity with Body mass index is 36.16 kg/m².     HENT:   Head: Normocephalic.   Right Ear: External ear normal.   Left Ear: External ear normal.   Nose: Nose normal.   Mouth/Throat: Oropharynx is clear and moist. No oropharyngeal exudate.   Eyes: EOM are normal. Pupils are equal, round, and reactive to light. Right eye exhibits no discharge. Left eye exhibits no discharge. No scleral icterus.   Neck: Normal range of motion. Neck supple. No tracheal deviation present. No thyromegaly present.   Cardiovascular: Normal rate, regular rhythm and normal heart sounds.   No murmur heard.  Pulmonary/Chest: Effort normal and breath sounds normal. No respiratory distress.   Abdominal: Soft. He exhibits no distension.   Genitourinary: Prostate normal.   Genitourinary Comments: + skin tag around anus nontender   Musculoskeletal: Normal range of motion. He exhibits no edema.   Lymphadenopathy:     He has no cervical adenopathy.   Neurological: He is alert and oriented to person, place, and time. Coordination normal.   Skin: Skin is warm and dry. No rash noted.   Psychiatric: He has a normal mood and affect. His behavior is normal.         Assessment:         ICD-10-CM ICD-9-CM   1. Annual physical exam Z00.00 V70.0   2. Essential " hypertension I10 401.9   3. ADHD (attention deficit hyperactivity disorder), inattentive type F90.0 314.00   4. Hyperlipidemia, unspecified hyperlipidemia type E78.5 272.4   5. Gastroesophageal reflux disease, esophagitis presence not specified K21.9 530.81   6. Tobacco user Z72.0 305.1       Plan:       Annual physical exam  Health Maintenance Summary     Colonoscopy Next Due 2/2/2023     Done 2/2/2018 COLONOSCOPY   Lipid Panel Next Due 3/9/2024     Done 3/9/2019 LIPID PANEL Abnormal     Done 11/26/2018 LIPID PANEL Abnormal     Done 6/16/2018 LIPID PANEL Abnormal     Done 3/12/2018 LIPID PANEL Abnormal     Done 10/28/2016 [employer]: WORKFLOW - HEALTHY PLANET - EXTERNAL DATA - EXTERNAL LAB DATE - LIPID PANEL    Patient has more history with this topic...   TETANUS VACCINE Next Due 7/30/2027     Done 7/30/2017 Imm Admin: Tdap    Done 9/22/2005 Imm Admin: Td (ADULT)   Pneumococcal Vaccine (Medium Risk) This plan is no longer active.     Done 3/12/2018 Imm Admin: Pneumococcal Polysaccharide - 23 Valent   Influenza Vaccine This plan is no longer active.     Done 9/19/2018 Imm Admin: Influenza - Quadrivalent - PF    Done 9/22/2017 Imm Admin: Influenza - Quadrivalent - PF    Done 9/10/2013 Imm Admin: Influenza - Intranasal         Essential hypertension  -  controlled on lisinopril and Maxzide at present doses.  Will send refill request when needed    ADHD (attention deficit hyperactivity disorder), inattentive type  -  continue Adderall at present dose and follow up in 3 months  -     ADDERALL XR 30 mg 24 hr capsule; Take 2 capsules (60 mg total) by mouth every morning.  Dispense: 180 capsule; Refill: 0    Hyperlipidemia, unspecified hyperlipidemia type  -  increase niacin at bedtime and continue pravastatin at present dose and we will recheck at next blood draw    Gastroesophageal reflux disease, esophagitis presence not specified  -  controlled on present medication    Tobacco user  -  declines smoking  cessation    Severe obesity (BMI 35.0-35.9 with comorbidity)  -  discussed weight loss and lifestyle modifications      Follow-up in about 3 months (around 6/15/2019) for med check.     Patient's Medications   New Prescriptions    No medications on file   Previous Medications    AMLODIPINE (NORVASC) 10 MG TABLET    Take 1 tablet (10 mg total) by mouth once daily.    DOXAZOSIN (CARDURA) 8 MG TAB    Take 1 tablet (8 mg total) by mouth once daily.    ESOMEPRAZOLE (NEXIUM) 40 MG CAPSULE    Take 1 capsule (40 mg total) by mouth once daily.    FISH OIL-OMEGA-3 FATTY ACIDS 300-1,000 MG CAPSULE    Take 1 g by mouth once daily.    LISINOPRIL (PRINIVIL,ZESTRIL) 40 MG TABLET    Take 1 tablet (40 mg total) by mouth once daily.    NIACIN 500 MG TAB    Take 1 tablet (500 mg total) by mouth every evening.    PRAVASTATIN (PRAVACHOL) 20 MG TABLET    Take 1 tablet (20 mg total) by mouth once daily.    TRIAMTERENE-HYDROCHLOROTHIAZIDE 37.5-25 MG (MAXZIDE-25) 37.5-25 MG PER TABLET    Take 1 and 1/2 tablets by mouth once daily.   Modified Medications    Modified Medication Previous Medication    ADDERALL XR 30 MG 24 HR CAPSULE ADDERALL XR 30 mg 24 hr capsule       Take 2 capsules (60 mg total) by mouth every morning.    Take 2 capsules (60 mg total) by mouth every morning.   Discontinued Medications    No medications on file

## 2019-04-25 DIAGNOSIS — I10 ESSENTIAL HYPERTENSION: ICD-10-CM

## 2019-04-25 RX ORDER — TRIAMTERENE/HYDROCHLOROTHIAZID 37.5-25 MG
1.5 TABLET ORAL DAILY
Qty: 135 TABLET | Refills: 0 | Status: CANCELLED | OUTPATIENT
Start: 2019-04-25 | End: 2020-04-24

## 2019-05-02 DIAGNOSIS — I10 ESSENTIAL HYPERTENSION: ICD-10-CM

## 2019-05-02 DIAGNOSIS — K21.9 GASTROESOPHAGEAL REFLUX DISEASE, ESOPHAGITIS PRESENCE NOT SPECIFIED: ICD-10-CM

## 2019-05-02 DIAGNOSIS — E78.5 HYPERLIPIDEMIA, UNSPECIFIED HYPERLIPIDEMIA TYPE: ICD-10-CM

## 2019-05-02 RX ORDER — AMLODIPINE BESYLATE 10 MG/1
10 TABLET ORAL DAILY
Qty: 90 TABLET | Refills: 1 | Status: SHIPPED | OUTPATIENT
Start: 2019-05-02 | End: 2019-11-15

## 2019-05-02 RX ORDER — LISINOPRIL 40 MG/1
40 TABLET ORAL DAILY
Qty: 90 TABLET | Refills: 1 | Status: SHIPPED | OUTPATIENT
Start: 2019-05-02 | End: 2019-11-15

## 2019-05-02 RX ORDER — TRIAMTERENE/HYDROCHLOROTHIAZID 37.5-25 MG
1.5 TABLET ORAL DAILY
Qty: 135 TABLET | Refills: 1 | Status: SHIPPED | OUTPATIENT
Start: 2019-05-02 | End: 2019-10-02 | Stop reason: SDUPTHER

## 2019-05-02 RX ORDER — ESOMEPRAZOLE MAGNESIUM 40 MG/1
40 CAPSULE, DELAYED RELEASE ORAL DAILY
Qty: 90 CAPSULE | Refills: 1 | Status: SHIPPED | OUTPATIENT
Start: 2019-05-02 | End: 2019-11-15

## 2019-05-02 RX ORDER — DOXAZOSIN 8 MG/1
8 TABLET ORAL DAILY
Qty: 90 TABLET | Refills: 0 | Status: SHIPPED | OUTPATIENT
Start: 2019-05-02 | End: 2019-10-02 | Stop reason: SDUPTHER

## 2019-05-02 RX ORDER — PRAVASTATIN SODIUM 20 MG/1
20 TABLET ORAL DAILY
Qty: 90 TABLET | Refills: 1 | Status: SHIPPED | OUTPATIENT
Start: 2019-05-02 | End: 2019-12-27 | Stop reason: SDUPTHER

## 2019-06-27 ENCOUNTER — OFFICE VISIT (OUTPATIENT)
Dept: FAMILY MEDICINE | Facility: CLINIC | Age: 54
End: 2019-06-27
Payer: COMMERCIAL

## 2019-06-27 VITALS
WEIGHT: 249.69 LBS | SYSTOLIC BLOOD PRESSURE: 128 MMHG | HEIGHT: 70 IN | DIASTOLIC BLOOD PRESSURE: 80 MMHG | BODY MASS INDEX: 35.75 KG/M2 | HEART RATE: 69 BPM | TEMPERATURE: 99 F | OXYGEN SATURATION: 98 %

## 2019-06-27 DIAGNOSIS — F90.0 ADHD (ATTENTION DEFICIT HYPERACTIVITY DISORDER), INATTENTIVE TYPE: Primary | ICD-10-CM

## 2019-06-27 DIAGNOSIS — I10 ESSENTIAL HYPERTENSION: ICD-10-CM

## 2019-06-27 DIAGNOSIS — E78.5 HYPERLIPIDEMIA, UNSPECIFIED HYPERLIPIDEMIA TYPE: ICD-10-CM

## 2019-06-27 PROCEDURE — 99999 PR PBB SHADOW E&M-EST. PATIENT-LVL IV: ICD-10-PCS | Mod: PBBFAC,,, | Performed by: NURSE PRACTITIONER

## 2019-06-27 PROCEDURE — 3074F SYST BP LT 130 MM HG: CPT | Mod: CPTII,S$GLB,, | Performed by: NURSE PRACTITIONER

## 2019-06-27 PROCEDURE — 99999 PR PBB SHADOW E&M-EST. PATIENT-LVL IV: CPT | Mod: PBBFAC,,, | Performed by: NURSE PRACTITIONER

## 2019-06-27 PROCEDURE — 3079F DIAST BP 80-89 MM HG: CPT | Mod: CPTII,S$GLB,, | Performed by: NURSE PRACTITIONER

## 2019-06-27 PROCEDURE — 3008F PR BODY MASS INDEX (BMI) DOCUMENTED: ICD-10-PCS | Mod: CPTII,S$GLB,, | Performed by: NURSE PRACTITIONER

## 2019-06-27 PROCEDURE — 99213 OFFICE O/P EST LOW 20 MIN: CPT | Mod: S$GLB,,, | Performed by: NURSE PRACTITIONER

## 2019-06-27 PROCEDURE — 3074F PR MOST RECENT SYSTOLIC BLOOD PRESSURE < 130 MM HG: ICD-10-PCS | Mod: CPTII,S$GLB,, | Performed by: NURSE PRACTITIONER

## 2019-06-27 PROCEDURE — 99213 PR OFFICE/OUTPT VISIT, EST, LEVL III, 20-29 MIN: ICD-10-PCS | Mod: S$GLB,,, | Performed by: NURSE PRACTITIONER

## 2019-06-27 PROCEDURE — 3079F PR MOST RECENT DIASTOLIC BLOOD PRESSURE 80-89 MM HG: ICD-10-PCS | Mod: CPTII,S$GLB,, | Performed by: NURSE PRACTITIONER

## 2019-06-27 PROCEDURE — 3008F BODY MASS INDEX DOCD: CPT | Mod: CPTII,S$GLB,, | Performed by: NURSE PRACTITIONER

## 2019-06-27 RX ORDER — DEXTROAMPHETAMINE SULFATE, DEXTROAMPHETAMINE SACCHARATE, AMPHETAMINE SULFATE AND AMPHETAMINE ASPARTATE 7.5; 7.5; 7.5; 7.5 MG/1; MG/1; MG/1; MG/1
60 CAPSULE, EXTENDED RELEASE ORAL EVERY MORNING
Qty: 180 CAPSULE | Refills: 0 | Status: SHIPPED | OUTPATIENT
Start: 2019-06-27 | End: 2019-10-02 | Stop reason: SDUPTHER

## 2019-06-27 NOTE — PROGRESS NOTES
Subjective:       Patient ID: Nick Mcdonough Sr. is a 53 y.o. male.    Chief Complaint: Medication Refill    Patient is a 53-year-old white male with hypertension, GERD, ADHD, tobacco user, fluid retention to lower extremities with venous stasis dermatitis , and Hyperlipidemia that is here today for ADHD med check.  All other chronic problems were addressed at March 2019 visit.     Patient has ADHD that is stable on current regimen.  Patient is able to focus and complete tasks effectively.  No side effects reported.      Current Outpatient Medications   Medication Sig Dispense Refill    ADDERALL XR 30 mg 24 hr capsule Take 2 capsules (60 mg total) by mouth every morning. 180 capsule 0    amLODIPine (NORVASC) 10 MG tablet Take 1 tablet (10 mg total) by mouth once daily. 90 tablet 1    doxazosin (CARDURA) 8 MG Tab Take 1 tablet (8 mg total) by mouth once daily. 90 tablet 0    esomeprazole (NEXIUM) 40 MG capsule Take 1 capsule (40 mg total) by mouth once daily. 90 capsule 1    fish oil-omega-3 fatty acids 300-1,000 mg capsule Take 1 g by mouth once daily.      lisinopril (PRINIVIL,ZESTRIL) 40 MG tablet Take 1 tablet (40 mg total) by mouth once daily. 90 tablet 1    niacin 500 MG Tab Take 1 tablet (500 mg total) by mouth every evening. (Patient taking differently: Take 1,000 mg by mouth every evening. ) 90 tablet 1    pravastatin (PRAVACHOL) 20 MG tablet Take 1 tablet (20 mg total) by mouth once daily. 90 tablet 1    triamterene-hydrochlorothiazide 37.5-25 mg (MAXZIDE-25) 37.5-25 mg per tablet Take 1.5 tablets by mouth once daily. 135 tablet 1     No current facility-administered medications for this visit.        Past Medical History:   Diagnosis Date    Adult ADHD     Alcohol dependency 9/2015    last alcohol intake September 2015    Colon polyp 02/02/2018    2 polyps - tubular adenoma - repeat colonoscopy in 5 years    GERD (gastroesophageal reflux disease)     Hyperlipidemia     Hypertension         Past Surgical History:   Procedure Laterality Date    COLONOSCOPY N/A 2018    Performed by [facility]    HERNIA REPAIR      INGUINAL HERNIA REPAIR Left 2012    [facility] at [facility]    KNEE SURGERY Right     VASECTOMY         Family History   Problem Relation Age of Onset    Hypertension Father     Heart disease Brother         acute MI -  at at 46    Cancer Maternal Grandmother     No Known Problems Maternal Grandfather     Heart disease Paternal Grandmother     Alcohol abuse Paternal Grandfather        Social History     Socioeconomic History    Marital status:      Spouse name: Not on file    Number of children: Not on file    Years of education: Not on file    Highest education level: Not on file   Occupational History    Not on file   Social Needs    Financial resource strain: Not very hard    Food insecurity:     Worry: Never true     Inability: Never true    Transportation needs:     Medical: No     Non-medical: No   Tobacco Use    Smoking status: Current Every Day Smoker     Packs/day: 1.00     Years: 15.00     Pack years: 15.00    Smokeless tobacco: Never Used   Substance and Sexual Activity    Alcohol use: No     Frequency: 2-3 times a week     Drinks per session: 3 or 4     Binge frequency: Monthly    Drug use: No    Sexual activity: Yes     Partners: Female   Lifestyle    Physical activity:     Days per week: 2 days     Minutes per session: 30 min    Stress: To some extent   Relationships    Social connections:     Talks on phone: Three times a week     Gets together: Once a week     Attends Confucianism service: Not on file     Active member of club or organization: No     Attends meetings of clubs or organizations: Never     Relationship status:    Other Topics Concern    Not on file   Social History Narrative    Not on file       Review of Systems   Constitutional: Negative for activity change and unexpected weight change.   HENT:  "Positive for hearing loss. Negative for rhinorrhea and trouble swallowing.    Eyes: Negative for discharge and visual disturbance.   Respiratory: Negative for chest tightness and wheezing.    Cardiovascular: Negative for chest pain and palpitations.   Gastrointestinal: Negative for blood in stool, constipation, diarrhea and vomiting.   Endocrine: Negative for polydipsia and polyuria.   Genitourinary: Negative for difficulty urinating, hematuria and urgency.   Musculoskeletal: Positive for arthralgias and joint swelling. Negative for neck pain.   Neurological: Negative for weakness and headaches.   Psychiatric/Behavioral: Negative for confusion and dysphoric mood.         Objective:     Vitals:    06/27/19 1406   BP: 128/80   Pulse: 69   Temp: 98.8 °F (37.1 °C)   TempSrc: Oral   SpO2: 98%   Weight: 113.3 kg (249 lb 10.7 oz)   Height: 5' 10" (1.778 m)          Physical Exam   Constitutional: He is oriented to person, place, and time. He appears well-developed and well-nourished. No distress.   + obesity with Body mass index is 35.82 kg/m².   HENT:   Head: Normocephalic.   Right Ear: External ear normal.   Left Ear: External ear normal.   Nose: Nose normal.   Mouth/Throat: Oropharynx is clear and moist. No oropharyngeal exudate.   Eyes: Pupils are equal, round, and reactive to light. EOM are normal. Right eye exhibits no discharge. Left eye exhibits no discharge. No scleral icterus.   Neck: Normal range of motion. Neck supple. No JVD present. No tracheal deviation present. No thyromegaly present.   Cardiovascular: Normal rate, regular rhythm and normal heart sounds.   No murmur heard.  Pulmonary/Chest: Effort normal and breath sounds normal. No stridor. No respiratory distress.   Abdominal: Soft. He exhibits no distension and no mass. There is no guarding.   Musculoskeletal: Normal range of motion.   Lymphadenopathy:     He has no cervical adenopathy.   Neurological: He is alert and oriented to person, place, and time. " Coordination normal.   Skin: Skin is warm and dry. No rash noted. He is not diaphoretic.   Psychiatric: He has a normal mood and affect. His behavior is normal.         Assessment:         ICD-10-CM ICD-9-CM   1. ADHD (attention deficit hyperactivity disorder), inattentive type F90.0 314.00   2. Hyperlipidemia, unspecified hyperlipidemia type E78.5 272.4   3. Essential hypertension I10 401.9       Plan:       ADHD (attention deficit hyperactivity disorder), inattentive type  -  Controlled on present medication - recheck in 3 months.  -     ADDERALL XR 30 mg 24 hr capsule; Take 2 capsules (60 mg total) by mouth every morning.  Dispense: 180 capsule; Refill: 0    Hyperlipidemia, unspecified hyperlipidemia type  -  Due for fasting labs and follow up in 3 months.  -     Lipid panel; Future; Expected date: 06/27/2019    Essential hypertension  -     Comprehensive metabolic panel; Future; Expected date: 06/27/2019      Follow up in about 3 months (around 9/27/2019) for fasting labs and follow up.     Patient's Medications   New Prescriptions    No medications on file   Previous Medications    AMLODIPINE (NORVASC) 10 MG TABLET    Take 1 tablet (10 mg total) by mouth once daily.    DOXAZOSIN (CARDURA) 8 MG TAB    Take 1 tablet (8 mg total) by mouth once daily.    ESOMEPRAZOLE (NEXIUM) 40 MG CAPSULE    Take 1 capsule (40 mg total) by mouth once daily.    FISH OIL-OMEGA-3 FATTY ACIDS 300-1,000 MG CAPSULE    Take 1 g by mouth once daily.    LISINOPRIL (PRINIVIL,ZESTRIL) 40 MG TABLET    Take 1 tablet (40 mg total) by mouth once daily.    NIACIN 500 MG TAB    Take 1 tablet (500 mg total) by mouth every evening.    PRAVASTATIN (PRAVACHOL) 20 MG TABLET    Take 1 tablet (20 mg total) by mouth once daily.    TRIAMTERENE-HYDROCHLOROTHIAZIDE 37.5-25 MG (MAXZIDE-25) 37.5-25 MG PER TABLET    Take 1.5 tablets by mouth once daily.   Modified Medications    Modified Medication Previous Medication    ADDERALL XR 30 MG 24 HR CAPSULE ADDERALL  XR 30 mg 24 hr capsule       Take 2 capsules (60 mg total) by mouth every morning.    Take 2 capsules (60 mg total) by mouth every morning.   Discontinued Medications    No medications on file

## 2019-08-30 ENCOUNTER — PATIENT MESSAGE (OUTPATIENT)
Dept: FAMILY MEDICINE | Facility: CLINIC | Age: 54
End: 2019-08-30

## 2019-10-02 ENCOUNTER — OFFICE VISIT (OUTPATIENT)
Dept: FAMILY MEDICINE | Facility: CLINIC | Age: 54
End: 2019-10-02
Payer: COMMERCIAL

## 2019-10-02 VITALS
HEIGHT: 70 IN | OXYGEN SATURATION: 98 % | RESPIRATION RATE: 18 BRPM | HEART RATE: 68 BPM | DIASTOLIC BLOOD PRESSURE: 82 MMHG | SYSTOLIC BLOOD PRESSURE: 124 MMHG | TEMPERATURE: 98 F | WEIGHT: 250.75 LBS | BODY MASS INDEX: 35.9 KG/M2

## 2019-10-02 DIAGNOSIS — E78.5 HYPERLIPIDEMIA, UNSPECIFIED HYPERLIPIDEMIA TYPE: ICD-10-CM

## 2019-10-02 DIAGNOSIS — K21.9 GASTROESOPHAGEAL REFLUX DISEASE, ESOPHAGITIS PRESENCE NOT SPECIFIED: ICD-10-CM

## 2019-10-02 DIAGNOSIS — Z23 FLU VACCINE NEED: ICD-10-CM

## 2019-10-02 DIAGNOSIS — E66.01 SEVERE OBESITY (BMI 35.0-35.9 WITH COMORBIDITY): ICD-10-CM

## 2019-10-02 DIAGNOSIS — Z72.0 TOBACCO USER: ICD-10-CM

## 2019-10-02 DIAGNOSIS — I10 ESSENTIAL HYPERTENSION: Primary | ICD-10-CM

## 2019-10-02 DIAGNOSIS — F90.0 ADHD (ATTENTION DEFICIT HYPERACTIVITY DISORDER), INATTENTIVE TYPE: ICD-10-CM

## 2019-10-02 PROCEDURE — 3074F PR MOST RECENT SYSTOLIC BLOOD PRESSURE < 130 MM HG: ICD-10-PCS | Mod: CPTII,S$GLB,, | Performed by: NURSE PRACTITIONER

## 2019-10-02 PROCEDURE — 3008F PR BODY MASS INDEX (BMI) DOCUMENTED: ICD-10-PCS | Mod: CPTII,S$GLB,, | Performed by: NURSE PRACTITIONER

## 2019-10-02 PROCEDURE — 3079F DIAST BP 80-89 MM HG: CPT | Mod: CPTII,S$GLB,, | Performed by: NURSE PRACTITIONER

## 2019-10-02 PROCEDURE — 90686 FLU VACCINE (QUAD) GREATER THAN OR EQUAL TO 3YO PRESERVATIVE FREE IM: ICD-10-PCS | Mod: S$GLB,,, | Performed by: NURSE PRACTITIONER

## 2019-10-02 PROCEDURE — 3079F PR MOST RECENT DIASTOLIC BLOOD PRESSURE 80-89 MM HG: ICD-10-PCS | Mod: CPTII,S$GLB,, | Performed by: NURSE PRACTITIONER

## 2019-10-02 PROCEDURE — 99214 OFFICE O/P EST MOD 30 MIN: CPT | Mod: 25,S$GLB,, | Performed by: NURSE PRACTITIONER

## 2019-10-02 PROCEDURE — 3008F BODY MASS INDEX DOCD: CPT | Mod: CPTII,S$GLB,, | Performed by: NURSE PRACTITIONER

## 2019-10-02 PROCEDURE — 99999 PR PBB SHADOW E&M-EST. PATIENT-LVL IV: CPT | Mod: PBBFAC,,, | Performed by: NURSE PRACTITIONER

## 2019-10-02 PROCEDURE — 3074F SYST BP LT 130 MM HG: CPT | Mod: CPTII,S$GLB,, | Performed by: NURSE PRACTITIONER

## 2019-10-02 PROCEDURE — 90471 IMMUNIZATION ADMIN: CPT | Mod: S$GLB,,, | Performed by: NURSE PRACTITIONER

## 2019-10-02 PROCEDURE — 99999 PR PBB SHADOW E&M-EST. PATIENT-LVL IV: ICD-10-PCS | Mod: PBBFAC,,, | Performed by: NURSE PRACTITIONER

## 2019-10-02 PROCEDURE — 90471 FLU VACCINE (QUAD) GREATER THAN OR EQUAL TO 3YO PRESERVATIVE FREE IM: ICD-10-PCS | Mod: S$GLB,,, | Performed by: NURSE PRACTITIONER

## 2019-10-02 PROCEDURE — 90686 IIV4 VACC NO PRSV 0.5 ML IM: CPT | Mod: S$GLB,,, | Performed by: NURSE PRACTITIONER

## 2019-10-02 PROCEDURE — 99214 PR OFFICE/OUTPT VISIT, EST, LEVL IV, 30-39 MIN: ICD-10-PCS | Mod: 25,S$GLB,, | Performed by: NURSE PRACTITIONER

## 2019-10-02 RX ORDER — DOXAZOSIN 8 MG/1
8 TABLET ORAL DAILY
Qty: 90 TABLET | Refills: 1 | Status: SHIPPED | OUTPATIENT
Start: 2019-10-02 | End: 2020-03-31

## 2019-10-02 RX ORDER — TRIAMTERENE/HYDROCHLOROTHIAZID 37.5-25 MG
1 TABLET ORAL DAILY
Qty: 90 TABLET | Refills: 1 | Status: SHIPPED | OUTPATIENT
Start: 2019-10-02 | End: 2020-03-31

## 2019-10-02 RX ORDER — DEXTROAMPHETAMINE SULFATE, DEXTROAMPHETAMINE SACCHARATE, AMPHETAMINE SULFATE AND AMPHETAMINE ASPARTATE 7.5; 7.5; 7.5; 7.5 MG/1; MG/1; MG/1; MG/1
60 CAPSULE, EXTENDED RELEASE ORAL EVERY MORNING
Qty: 180 CAPSULE | Refills: 0 | Status: SHIPPED | OUTPATIENT
Start: 2019-10-02 | End: 2019-12-27 | Stop reason: SDUPTHER

## 2019-10-02 NOTE — PROGRESS NOTES
"Subjective:       Patient ID: Nick Mcdonough Sr. is a 54 y.o. male.    Chief Complaint: Follow-up (3 month F/U); Medication Refill; and Flu Vaccine    Patient is a 53-year-old white male with hypertension, GERD, ADHD, tobacco user, fluid retention to lower extremities with venous stasis dermatitis , and Hyperlipidemia that is here today for 6 month check with fasting lab results.    Patient has hypertension that is controlled on present medications, Lisinopril 40 mg daily, Doxazosin 8 mg daily and Maxzide 25 mg 1 1/2 tablets daily.  His compliance medication record shows that he takes is lisinopril Maxzide almost daily but his doxazosin only shows 68% compliance.  Patient's BUN is elevated indicating some dehydration in his sodium and potassium on the low end of normal.  Advised patient 1 him to take his doxazosin 8 mg every single day and I am going to cut back is Maxzide to 1 tablet daily.  /82 (BP Location: Right arm, Patient Position: Sitting, BP Method: Large (Manual))   Pulse 68   Temp 98.4 °F (36.9 °C) (Oral)   Resp 18   Ht 5' 10" (1.778 m)   Wt 113.7 kg (250 lb 12.4 oz)   SpO2 98%   BMI 35.98 kg/m²      Patient has history of hyperlipidemia. Due to patient age, hypertension, smoker, his 10 year risk was greater than 10% so was started on Crestor 10 mg daily in March 2018.  Cholesterol levels were improved and LDL was 54.0 in June 2018 but patient reports that the Crestor caused acute itching so stopped medication.  HDL was chronically low and declines smoking cessation so at last visit, decided to trial a low dose pravastatin with Niacin at bedtime.  The total cholesterol and LDL are within acceptable range.  Triglycerides are improved but still high at 194 HDL has also improved from 20/9 up to 36 but still low.  Advised to increase exercise and work on weight loss.    Patient has GERD that is controlled on Nexium at present dose.     Patient is a smoker and declines smoking cessation " "program.      Patient is severely obese with Body mass index is 35.98 kg/m².     Patient has ADHD that is stable on current regimen.  Patient is able to focus and complete tasks effectively.  No side effects reported. Vital Signs are stable.  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  /82 (BP Location: Right arm, Patient Position: Sitting, BP Method: Large (Manual))   Pulse 68   Temp 98.4 °F (36.9 °C) (Oral)   Resp 18   Ht 5' 10" (1.778 m)   Wt 113.7 kg (250 lb 12.4 oz)   SpO2 98%   BMI 35.98 kg/m²       Component      Latest Ref Rng & Units 9/28/2019 3/9/2019 11/26/2018 10/19/2018   Sodium      136 - 145 mmol/L 139 141 141 140   Potassium      3.5 - 5.1 mmol/L 3.5 3.7 3.3 (L) 3.5   Chloride      95 - 110 mmol/L 104 103 107 104   CO2      23 - 29 mmol/L 27 26 25 23   Glucose      70 - 110 mg/dL 107 107 87 99   BUN, Bld      2 - 20 mg/dL 28 (H) 22 (H) 19 38 (H)   Creatinine      0.50 - 1.40 mg/dL 1.01 0.94 1.05 1.60 (H)   Calcium      8.7 - 10.5 mg/dL 9.3 9.2 9.5 9.3   PROTEIN TOTAL      6.0 - 8.4 g/dL 7.5 7.3 7.4 7.8   Albumin      3.5 - 5.2 g/dL 4.3 4.2 4.3 4.4   BILIRUBIN TOTAL      0.1 - 1.0 mg/dL 0.4 0.4 0.5 0.5   Alkaline Phosphatase      38 - 126 U/L 106 96 84 74   AST      15 - 46 U/L 40 35 40 27   ALT      10 - 44 U/L 40 43 47 (H) 28   Anion Gap      8 - 16 mmol/L 8 12 9 13   eGFR if African American      >60 mL/min/1.73 m:2 >60.0 >60.0 >60.0 56.4 (A)   eGFR if non African American      >60 mL/min/1.73 m:2 >60.0 >60.0 >60.0 48.8 (A)   Cholesterol      120 - 199 mg/dL 155 137 175    Triglycerides      30 - 150 mg/dL 194 (H) 243 (H) 135    HDL      40 - 75 mg/dL 36 (L) 29 (L) 35 (L)    LDL Cholesterol External      63.0 - 159.0 mg/dL 80.2 59.4 (L) 113.0    Hdl/Cholesterol Ratio      20.0 - 50.0 % 23.2 21.2 20.0    Total Cholesterol/HDL Ratio      2.0 - 5.0 4.3 4.7 5.0    Non-HDL Cholesterol      mg/dL 119 108 140      Current Outpatient " Medications   Medication Sig Dispense Refill    ADDERALL XR 30 mg 24 hr capsule Take 2 capsules (60 mg total) by mouth every morning. 180 capsule 0    amLODIPine (NORVASC) 10 MG tablet Take 1 tablet (10 mg total) by mouth once daily. 90 tablet 1    doxazosin (CARDURA) 8 MG Tab Take 1 tablet (8 mg total) by mouth once daily. 90 tablet 0    esomeprazole (NEXIUM) 40 MG capsule Take 1 capsule (40 mg total) by mouth once daily. 90 capsule 1    fish oil-omega-3 fatty acids 300-1,000 mg capsule Take 1 g by mouth once daily.      lisinopril (PRINIVIL,ZESTRIL) 40 MG tablet Take 1 tablet (40 mg total) by mouth once daily. 90 tablet 1    niacin 500 MG Tab Take 1 tablet (500 mg total) by mouth every evening. (Patient taking differently: Take 1,000 mg by mouth every evening. ) 90 tablet 1    pravastatin (PRAVACHOL) 20 MG tablet Take 1 tablet (20 mg total) by mouth once daily. 90 tablet 1    triamterene-hydrochlorothiazide 37.5-25 mg (MAXZIDE-25) 37.5-25 mg per tablet Take 1.5 tablets by mouth once daily. 135 tablet 1     No current facility-administered medications for this visit.        Past Medical History:   Diagnosis Date    Adult ADHD     Alcohol dependency 2015    last alcohol intake 2015    Colon polyp 2018    2 polyps - tubular adenoma - repeat colonoscopy in 5 years    GERD (gastroesophageal reflux disease)     Hyperlipidemia     Hypertension        Past Surgical History:   Procedure Laterality Date    COLONOSCOPY N/A 2018    Procedure: COLONOSCOPY;  Surgeon: Eduard Medley MD;  Location: [facility];  Service: Endoscopy;  Laterality: N/A;    HERNIA REPAIR      INGUINAL HERNIA REPAIR Left     done at [facility]    KNEE SURGERY Right     VASECTOMY         Family History   Problem Relation Age of Onset    Hypertension Father     Heart disease Brother         acute MI -  at at 46    Cancer Maternal Grandmother     No Known Problems Maternal Grandfather     Heart disease  Paternal Grandmother     Alcohol abuse Paternal Grandfather        Social History     Socioeconomic History    Marital status:      Spouse name: Not on file    Number of children: Not on file    Years of education: Not on file    Highest education level: Not on file   Occupational History    Not on file   Social Needs    Financial resource strain: Not very hard    Food insecurity:     Worry: Never true     Inability: Never true    Transportation needs:     Medical: No     Non-medical: No   Tobacco Use    Smoking status: Current Every Day Smoker     Packs/day: 1.00     Years: 15.00     Pack years: 15.00    Smokeless tobacco: Never Used   Substance and Sexual Activity    Alcohol use: No     Frequency: 2-3 times a week     Drinks per session: 3 or 4     Binge frequency: Monthly    Drug use: No    Sexual activity: Yes     Partners: Female   Lifestyle    Physical activity:     Days per week: 2 days     Minutes per session: 30 min    Stress: To some extent   Relationships    Social connections:     Talks on phone: Three times a week     Gets together: Once a week     Attends Faith service: Not on file     Active member of club or organization: No     Attends meetings of clubs or organizations: Never     Relationship status:    Other Topics Concern    Not on file   Social History Narrative    Not on file       Review of Systems   Constitutional: Negative for activity change, appetite change, fatigue, fever and unexpected weight change.   HENT: Negative for congestion, ear pain, mouth sores, nosebleeds, postnasal drip, rhinorrhea, sinus pressure, sneezing, sore throat, trouble swallowing and voice change.    Eyes: Negative.    Respiratory: Negative for cough, chest tightness and shortness of breath.    Cardiovascular: Negative for chest pain, palpitations and leg swelling.   Gastrointestinal: Negative.  Negative for abdominal pain, blood in stool, constipation, diarrhea, nausea and  "vomiting.   Endocrine: Negative.    Genitourinary: Negative for difficulty urinating, dysuria, flank pain, hematuria and urgency.   Musculoskeletal: Negative for arthralgias, back pain, gait problem, joint swelling, myalgias and neck pain.   Skin: Negative for color change, rash and wound.   Allergic/Immunologic: Negative for immunocompromised state.   Neurological: Negative for dizziness, tremors, seizures, syncope, speech difficulty and headaches.   Hematological: Negative for adenopathy. Does not bruise/bleed easily.   Psychiatric/Behavioral: Negative for behavioral problems, dysphoric mood, sleep disturbance and suicidal ideas. The patient is not nervous/anxious.          Objective:     Vitals:    10/02/19 1614   BP: 118/88   BP Location: Right arm   Patient Position: Sitting   BP Method: Large (Manual)   Pulse: 68   Resp: 18   Temp: 98.4 °F (36.9 °C)   TempSrc: Oral   SpO2: 98%   Weight: 113.7 kg (250 lb 12.4 oz)   Height: 5' 10" (1.778 m)          Physical Exam   Constitutional: He is oriented to person, place, and time. He appears well-developed and well-nourished. No distress.   + obesity with Body mass index is 35.98 kg/m².           HENT:   Head: Normocephalic.   Right Ear: External ear normal.   Left Ear: External ear normal.   Nose: Nose normal.   Mouth/Throat: Oropharynx is clear and moist. No oropharyngeal exudate.   Eyes: Pupils are equal, round, and reactive to light. EOM are normal. Right eye exhibits no discharge. Left eye exhibits no discharge. No scleral icterus.   Neck: Normal range of motion. Neck supple. No JVD present. No tracheal deviation present. No thyromegaly present.   Cardiovascular: Normal rate, regular rhythm and normal heart sounds.   No murmur heard.  Pulmonary/Chest: Effort normal and breath sounds normal. No stridor. No respiratory distress.   Abdominal: Soft. He exhibits no distension and no mass. There is no guarding.   Musculoskeletal: Normal range of motion. "   Lymphadenopathy:     He has no cervical adenopathy.   Neurological: He is alert and oriented to person, place, and time. Coordination normal.   Skin: Skin is warm and dry. No rash noted. He is not diaphoretic.   Psychiatric: He has a normal mood and affect. His behavior is normal.         Assessment:         ICD-10-CM ICD-9-CM   1. Essential hypertension I10 401.9   2. Hyperlipidemia, unspecified hyperlipidemia type E78.5 272.4   3. Gastroesophageal reflux disease, esophagitis presence not specified K21.9 530.81   4. ADHD (attention deficit hyperactivity disorder), inattentive type F90.0 314.00   5. Severe obesity (BMI 35.0-35.9 with comorbidity) E66.01 278.01    Z68.35 V85.35   6. Tobacco user Z72.0 305.1   7. Flu vaccine need Z23 V04.81       Plan:       Essential hypertension  -  continue the lisinopril 40 mg daily, take your doxazosin 8 mg every day, and cut back on Maxzide from 1.5 tablets to 1 tablet daily.  Recheck in 3 months.  -     doxazosin (CARDURA) 8 MG Tab; Take 1 tablet (8 mg total) by mouth once daily.  Dispense: 90 tablet; Refill: 1  -     triamterene-hydrochlorothiazide 37.5-25 mg (MAXZIDE-25) 37.5-25 mg per tablet; Take 1 tablet by mouth once daily.  Dispense: 90 tablet; Refill: 1    Hyperlipidemia, unspecified hyperlipidemia type  -  continue the pravastatin and niacin at present doses - work on weight loss and lifestyle modifications.    Gastroesophageal reflux disease, esophagitis presence not specified  =-  controlled on medicine    ADHD (attention deficit hyperactivity disorder), inattentive type  -  controlled on current medication.  Follow-up in 3 months  -     ADDERALL XR 30 mg 24 hr capsule; Take 2 capsules (60 mg total) by mouth every morning.  Dispense: 180 capsule; Refill: 0    Severe obesity (BMI 35.0-35.9 with comorbidity)  -  work on weight loss and lifestyle modifications    Tobacco user  -  declines smoking cessation program    Flu vaccine need  -     Influenza - Quadrivalent  (PF)      Follow up in about 3 months (around 1/2/2020) for med check only.     Patient's Medications   New Prescriptions    No medications on file   Previous Medications    AMLODIPINE (NORVASC) 10 MG TABLET    Take 1 tablet (10 mg total) by mouth once daily.    ESOMEPRAZOLE (NEXIUM) 40 MG CAPSULE    Take 1 capsule (40 mg total) by mouth once daily.    FISH OIL-OMEGA-3 FATTY ACIDS 300-1,000 MG CAPSULE    Take 1 g by mouth once daily.    LISINOPRIL (PRINIVIL,ZESTRIL) 40 MG TABLET    Take 1 tablet (40 mg total) by mouth once daily.    NIACIN 500 MG TAB    Take 1 tablet (500 mg total) by mouth every evening.    PRAVASTATIN (PRAVACHOL) 20 MG TABLET    Take 1 tablet (20 mg total) by mouth once daily.   Modified Medications    Modified Medication Previous Medication    ADDERALL XR 30 MG 24 HR CAPSULE ADDERALL XR 30 mg 24 hr capsule       Take 2 capsules (60 mg total) by mouth every morning.    Take 2 capsules (60 mg total) by mouth every morning.    DOXAZOSIN (CARDURA) 8 MG TAB doxazosin (CARDURA) 8 MG Tab       Take 1 tablet (8 mg total) by mouth once daily.    Take 1 tablet (8 mg total) by mouth once daily.    TRIAMTERENE-HYDROCHLOROTHIAZIDE 37.5-25 MG (MAXZIDE-25) 37.5-25 MG PER TABLET triamterene-hydrochlorothiazide 37.5-25 mg (MAXZIDE-25) 37.5-25 mg per tablet       Take 1 tablet by mouth once daily.    Take 1.5 tablets by mouth once daily.   Discontinued Medications    No medications on file

## 2019-11-15 DIAGNOSIS — K21.9 GASTROESOPHAGEAL REFLUX DISEASE, ESOPHAGITIS PRESENCE NOT SPECIFIED: ICD-10-CM

## 2019-11-15 RX ORDER — AMLODIPINE BESYLATE 10 MG/1
10 TABLET ORAL DAILY
Qty: 90 TABLET | Refills: 1 | Status: SHIPPED | OUTPATIENT
Start: 2019-11-15 | End: 2020-03-31

## 2019-11-15 RX ORDER — LISINOPRIL 40 MG/1
40 TABLET ORAL DAILY
Qty: 90 TABLET | Refills: 1 | Status: SHIPPED | OUTPATIENT
Start: 2019-11-15 | End: 2020-03-31

## 2019-11-15 RX ORDER — LISINOPRIL 40 MG/1
40 TABLET ORAL DAILY
Qty: 90 TABLET | Refills: 1 | Status: CANCELLED | OUTPATIENT
Start: 2019-11-15

## 2019-11-15 RX ORDER — ESOMEPRAZOLE MAGNESIUM 40 MG/1
40 CAPSULE, DELAYED RELEASE ORAL DAILY
Qty: 90 CAPSULE | Refills: 1 | Status: SHIPPED | OUTPATIENT
Start: 2019-11-15 | End: 2020-03-31

## 2019-11-15 RX ORDER — AMLODIPINE BESYLATE 10 MG/1
10 TABLET ORAL DAILY
Qty: 90 TABLET | Refills: 1 | Status: CANCELLED | OUTPATIENT
Start: 2019-11-15

## 2019-12-27 ENCOUNTER — OFFICE VISIT (OUTPATIENT)
Dept: FAMILY MEDICINE | Facility: CLINIC | Age: 54
End: 2019-12-27
Payer: COMMERCIAL

## 2019-12-27 VITALS
DIASTOLIC BLOOD PRESSURE: 78 MMHG | OXYGEN SATURATION: 96 % | RESPIRATION RATE: 18 BRPM | HEIGHT: 70 IN | BODY MASS INDEX: 34.72 KG/M2 | SYSTOLIC BLOOD PRESSURE: 114 MMHG | TEMPERATURE: 99 F | HEART RATE: 100 BPM | WEIGHT: 242.5 LBS

## 2019-12-27 DIAGNOSIS — Z13.29 THYROID DISORDER SCREEN: ICD-10-CM

## 2019-12-27 DIAGNOSIS — Z13.1 DIABETES MELLITUS SCREENING: ICD-10-CM

## 2019-12-27 DIAGNOSIS — Z13.0 SCREENING, ANEMIA, DEFICIENCY, IRON: ICD-10-CM

## 2019-12-27 DIAGNOSIS — Z12.5 PROSTATE CANCER SCREENING: ICD-10-CM

## 2019-12-27 DIAGNOSIS — F90.0 ADHD (ATTENTION DEFICIT HYPERACTIVITY DISORDER), INATTENTIVE TYPE: ICD-10-CM

## 2019-12-27 DIAGNOSIS — E78.5 HYPERLIPIDEMIA, UNSPECIFIED HYPERLIPIDEMIA TYPE: ICD-10-CM

## 2019-12-27 DIAGNOSIS — K21.9 GASTROESOPHAGEAL REFLUX DISEASE, ESOPHAGITIS PRESENCE NOT SPECIFIED: ICD-10-CM

## 2019-12-27 DIAGNOSIS — I10 ESSENTIAL HYPERTENSION: Primary | ICD-10-CM

## 2019-12-27 PROCEDURE — 3078F DIAST BP <80 MM HG: CPT | Mod: CPTII,S$GLB,, | Performed by: NURSE PRACTITIONER

## 2019-12-27 PROCEDURE — 99999 PR PBB SHADOW E&M-EST. PATIENT-LVL IV: ICD-10-PCS | Mod: PBBFAC,,, | Performed by: NURSE PRACTITIONER

## 2019-12-27 PROCEDURE — 3008F BODY MASS INDEX DOCD: CPT | Mod: CPTII,S$GLB,, | Performed by: NURSE PRACTITIONER

## 2019-12-27 PROCEDURE — 3074F PR MOST RECENT SYSTOLIC BLOOD PRESSURE < 130 MM HG: ICD-10-PCS | Mod: CPTII,S$GLB,, | Performed by: NURSE PRACTITIONER

## 2019-12-27 PROCEDURE — 99213 OFFICE O/P EST LOW 20 MIN: CPT | Mod: S$GLB,,, | Performed by: NURSE PRACTITIONER

## 2019-12-27 PROCEDURE — 99999 PR PBB SHADOW E&M-EST. PATIENT-LVL IV: CPT | Mod: PBBFAC,,, | Performed by: NURSE PRACTITIONER

## 2019-12-27 PROCEDURE — 99213 PR OFFICE/OUTPT VISIT, EST, LEVL III, 20-29 MIN: ICD-10-PCS | Mod: S$GLB,,, | Performed by: NURSE PRACTITIONER

## 2019-12-27 PROCEDURE — 3078F PR MOST RECENT DIASTOLIC BLOOD PRESSURE < 80 MM HG: ICD-10-PCS | Mod: CPTII,S$GLB,, | Performed by: NURSE PRACTITIONER

## 2019-12-27 PROCEDURE — 3008F PR BODY MASS INDEX (BMI) DOCUMENTED: ICD-10-PCS | Mod: CPTII,S$GLB,, | Performed by: NURSE PRACTITIONER

## 2019-12-27 PROCEDURE — 3074F SYST BP LT 130 MM HG: CPT | Mod: CPTII,S$GLB,, | Performed by: NURSE PRACTITIONER

## 2019-12-27 RX ORDER — DOXAZOSIN 8 MG/1
8 TABLET ORAL DAILY
Qty: 90 TABLET | Refills: 1 | Status: CANCELLED | OUTPATIENT
Start: 2019-12-27

## 2019-12-27 RX ORDER — ESOMEPRAZOLE MAGNESIUM 40 MG/1
40 CAPSULE, DELAYED RELEASE ORAL DAILY
Qty: 90 CAPSULE | Refills: 1 | Status: CANCELLED | OUTPATIENT
Start: 2019-12-27

## 2019-12-27 RX ORDER — AMLODIPINE BESYLATE 10 MG/1
10 TABLET ORAL DAILY
Qty: 90 TABLET | Refills: 1 | Status: CANCELLED | OUTPATIENT
Start: 2019-12-27

## 2019-12-27 RX ORDER — PRAVASTATIN SODIUM 20 MG/1
20 TABLET ORAL DAILY
Qty: 90 TABLET | Refills: 0 | Status: SHIPPED | OUTPATIENT
Start: 2019-12-27 | End: 2020-03-31

## 2019-12-27 RX ORDER — TRIAMTERENE/HYDROCHLOROTHIAZID 37.5-25 MG
1 TABLET ORAL DAILY
Qty: 90 TABLET | Refills: 1 | Status: CANCELLED | OUTPATIENT
Start: 2019-12-27 | End: 2020-12-26

## 2019-12-27 RX ORDER — LISINOPRIL 40 MG/1
40 TABLET ORAL DAILY
Qty: 90 TABLET | Refills: 1 | Status: CANCELLED | OUTPATIENT
Start: 2019-12-27

## 2019-12-27 RX ORDER — DEXTROAMPHETAMINE SULFATE, DEXTROAMPHETAMINE SACCHARATE, AMPHETAMINE SULFATE AND AMPHETAMINE ASPARTATE 7.5; 7.5; 7.5; 7.5 MG/1; MG/1; MG/1; MG/1
60 CAPSULE, EXTENDED RELEASE ORAL EVERY MORNING
Qty: 180 CAPSULE | Refills: 0 | Status: SHIPPED | OUTPATIENT
Start: 2019-12-27 | End: 2020-03-26 | Stop reason: SDUPTHER

## 2019-12-27 RX ORDER — GLUCOSAMINE/CHONDR SU A SOD 167-133 MG
500 CAPSULE ORAL NIGHTLY
Qty: 90 TABLET | Refills: 1 | Status: CANCELLED | OUTPATIENT
Start: 2019-12-27 | End: 2020-12-26

## 2019-12-27 NOTE — PROGRESS NOTES
"Subjective:       Patient ID: Nick Mcdonough Sr. is a 54 y.o. male.    Chief Complaint: Follow-up (3 months F/U) and Medication Refill    Patient is a 54-year-old white male with hypertension, GERD, ADHD, tobacco user, fluid retention to lower extremities with venous stasis dermatitis, obesity and Hyperlipidemia that is here today for 3 month follow up.    Patient has Hypertension that is controlled on present medications, Lisinopril 40 mg daily, Doxazosin 8 mg daily and Maxzide 25 mg 1 1/2 tablets daily.  His compliance medication record shows that he takes his lisinopril and Maxzide almost daily but his doxazosin only shows 68% compliance.  Patient's BUN was elevated in October 2019 indicating some dehydration and his sodium and potassium on the low end of normal.  Patient was advised in October 2019 to take his doxazosin 8 mg every single day and CUT BACK on  Maxzide to 1 tablet daily. Patient admits he did not cut back on the Maxzide so he will do so today.  /78 (BP Location: Left arm, Patient Position: Sitting, BP Method: Large (Manual))   Pulse 100   Temp 98.6 °F (37 °C) (Oral)   Resp 18   Ht 5' 10" (1.778 m)   Wt 110 kg (242 lb 8.1 oz)   SpO2 96%   BMI 34.80 kg/m²     Patient has Hyperlipidemia. Due to patient age, hypertension, smoker, his 10 year risk was greater than 10% so was started on Crestor 10 mg daily in March 2018.  Cholesterol levels were improved and LDL was 54.0 in June 2018 but patient reported that the Crestor caused acute itching so stopped medication.  HDL was chronically low and declines smoking cessation in March 2019, so decided to trial a low dose pravastatin with Niacin at bedtime.  The total cholesterol and LDL were within acceptable range when checked in October 2019.  Triglycerides were improved but still high at 194 HDL has also improved from 29 up to 36 but still low.  Advised to increase exercise and work on weight loss - will recheck cholesterol levels in March " "2020.     Patient has GERD that is controlled on Nexium at present dose.     Patient is a smoker and declines smoking cessation program.      Patient is obese with Body mass index is 34.8 kg/m².     Patient has ADHD that is stable on current regimen.  Patient is able to focus and complete tasks effectively.  No side effects reported. Vital Signs are stable.  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  /78 (BP Location: Left arm, Patient Position: Sitting, BP Method: Large (Manual))   Pulse 100   Temp 98.6 °F (37 °C) (Oral)   Resp 18   Ht 5' 10" (1.778 m)   Wt 110 kg (242 lb 8.1 oz)   SpO2 96%   BMI 34.80 kg/m²         Current Outpatient Medications   Medication Sig Dispense Refill    ADDERALL XR 30 mg 24 hr capsule Take 2 capsules (60 mg total) by mouth every morning. BRAND ONLY 180 capsule 0    amLODIPine (NORVASC) 10 MG tablet Take 1 tablet (10 mg total) by mouth once daily. 90 tablet 1    doxazosin (CARDURA) 8 MG Tab Take 1 tablet (8 mg total) by mouth once daily. 90 tablet 1    esomeprazole (NEXIUM) 40 MG capsule Take 1 capsule (40 mg total) by mouth once daily. 90 capsule 1    fish oil-omega-3 fatty acids 300-1,000 mg capsule Take 1 g by mouth once daily.      lisinopril (PRINIVIL,ZESTRIL) 40 MG tablet Take 1 tablet (40 mg total) by mouth once daily. 90 tablet 1    niacin 500 MG Tab Take 1 tablet (500 mg total) by mouth every evening. (Patient taking differently: Take 1,000 mg by mouth every evening. ) 90 tablet 1    pravastatin (PRAVACHOL) 20 MG tablet Take 1 tablet (20 mg total) by mouth once daily. 90 tablet 0    triamterene-hydrochlorothiazide 37.5-25 mg (MAXZIDE-25) 37.5-25 mg per tablet Take 1 tablet by mouth once daily. 90 tablet 1     No current facility-administered medications for this visit.        Past Medical History:   Diagnosis Date    Adult ADHD     Alcohol dependency 9/2015    last alcohol intake September 2015    Colon " polyp 2018    2 polyps - tubular adenoma - repeat colonoscopy in 5 years    GERD (gastroesophageal reflux disease)     Hyperlipidemia     Hypertension        Past Surgical History:   Procedure Laterality Date    COLONOSCOPY N/A 2018    Procedure: COLONOSCOPY;  Surgeon: Eduard Medley MD;  Location[facility];  Service: Endoscopy;  Laterality: N/A;    HERNIA REPAIR      INGUINAL HERNIA REPAIR Left 2012    [facility] at [facility]    KNEE SURGERY Right     VASECTOMY         Family History   Problem Relation Age of Onset    Hypertension Father     Heart disease Brother         acute MI -  at at 46    Cancer Maternal Grandmother     No Known Problems Maternal Grandfather     Heart disease Paternal Grandmother     Alcohol abuse Paternal Grandfather        Social History     Socioeconomic History    Marital status:      Spouse name: Not on file    Number of children: Not on file    Years of education: Not on file    Highest education level: Not on file   Occupational History    Not on file   Social Needs    Financial resource strain: Not very hard    Food insecurity:     Worry: Never true     Inability: Never true    Transportation needs:     Medical: No     Non-medical: No   Tobacco Use    Smoking status: Current Every Day Smoker     Packs/day: 1.00     Years: 15.00     Pack years: 15.00    Smokeless tobacco: Never Used   Substance and Sexual Activity    Alcohol use: No     Frequency: 2-3 times a week     Drinks per session: 3 or 4     Binge frequency: Monthly    Drug use: No    Sexual activity: Yes     Partners: Female   Lifestyle    Physical activity:     Days per week: 2 days     Minutes per session: 30 min    Stress: To some extent   Relationships    Social connections:     Talks on phone: Three times a week     Gets together: Once a week     Attends Christian service: Not on file     Active member of club or organization: No     Attends meetings of clubs or organizations:  "Never     Relationship status:    Other Topics Concern    Not on file   Social History Narrative    Not on file       Review of Systems   Constitutional: Negative for activity change, appetite change, fatigue, fever and unexpected weight change.   HENT: Negative for congestion, ear pain, mouth sores, nosebleeds, postnasal drip, rhinorrhea, sinus pressure, sneezing, sore throat, trouble swallowing and voice change.    Eyes: Negative.    Respiratory: Negative for cough, chest tightness and shortness of breath.    Cardiovascular: Negative for chest pain, palpitations and leg swelling.   Gastrointestinal: Negative.  Negative for abdominal pain, blood in stool, constipation, diarrhea, nausea and vomiting.   Endocrine: Negative.    Genitourinary: Negative for difficulty urinating, dysuria, flank pain, hematuria and urgency.   Musculoskeletal: Negative for arthralgias, back pain, gait problem, joint swelling, myalgias and neck pain.   Skin: Negative for color change, rash and wound.   Allergic/Immunologic: Negative for immunocompromised state.   Neurological: Negative for dizziness, tremors, seizures, syncope, speech difficulty and headaches.   Hematological: Negative for adenopathy. Does not bruise/bleed easily.   Psychiatric/Behavioral: Negative for behavioral problems, dysphoric mood, sleep disturbance and suicidal ideas. The patient is not nervous/anxious.          Objective:     Vitals:    12/27/19 0800   BP: 114/78   BP Location: Left arm   Patient Position: Sitting   BP Method: Large (Manual)   Pulse: 100   Resp: 18   Temp: 98.6 °F (37 °C)   TempSrc: Oral   SpO2: 96%   Weight: 110 kg (242 lb 8.1 oz)   Height: 5' 10" (1.778 m)          Physical Exam   Constitutional: He is oriented to person, place, and time. He appears well-developed and well-nourished. No distress.   + obesity with Body mass index is 34.8 kg/m².   HENT:   Head: Normocephalic.   Right Ear: External ear normal.   Left Ear: External ear " normal.   Nose: Nose normal.   Mouth/Throat: Oropharynx is clear and moist. No oropharyngeal exudate.   Eyes: Pupils are equal, round, and reactive to light. EOM are normal. Right eye exhibits no discharge. Left eye exhibits no discharge. No scleral icterus.   Neck: Normal range of motion. Neck supple. No JVD present. No tracheal deviation present. No thyromegaly present.   Cardiovascular: Normal rate, regular rhythm and normal heart sounds.   No murmur heard.  Pulmonary/Chest: Effort normal and breath sounds normal. No stridor. No respiratory distress.   Abdominal: Soft. He exhibits no distension and no mass. There is no guarding.   Musculoskeletal: Normal range of motion.   Lymphadenopathy:     He has no cervical adenopathy.   Neurological: He is alert and oriented to person, place, and time. Coordination normal.   Skin: Skin is warm and dry. No rash noted. He is not diaphoretic.   Psychiatric: He has a normal mood and affect. His behavior is normal.         Assessment:         ICD-10-CM ICD-9-CM   1. Essential hypertension I10 401.9   2. Hyperlipidemia, unspecified hyperlipidemia type E78.5 272.4   3. Gastroesophageal reflux disease, esophagitis presence not specified K21.9 530.81   4. ADHD (attention deficit hyperactivity disorder), inattentive type F90.0 314.00   5. Thyroid disorder screen Z13.29 V77.0   6. Screening, anemia, deficiency, iron Z13.0 V78.0   7. Diabetes mellitus screening Z13.1 V77.1   8. Prostate cancer screening Z12.5 V76.44       Plan:       Essential hypertension  -  MUST CUT BACK on Maxzide from 1 1/2 tablets down to 1 tablet daily.  Continue Lisinopril at present dose and BE CONSISTENT taking Doxazosin every night.    Hyperlipidemia, unspecified hyperlipidemia type  -  Continue Pravastatin and Niacin nightly.  -     pravastatin (PRAVACHOL) 20 MG tablet; Take 1 tablet (20 mg total) by mouth once daily.  Dispense: 90 tablet; Refill: 0  -     Lipid panel; Future; Expected date:  12/27/2019    Gastroesophageal reflux disease, esophagitis presence not specified  -  Controlled on Nexium    ADHD (attention deficit hyperactivity disorder), inattentive type  -  Controlled on present dose.  -     ADDERALL XR 30 mg 24 hr capsule; Take 2 capsules (60 mg total) by mouth every morning. BRAND ONLY  Dispense: 180 capsule; Refill: 0    Thyroid disorder screen  -     TSH; Future; Expected date: 12/27/2019    Screening, anemia, deficiency, iron  -     CBC auto differential; Future; Expected date: 12/27/2019    Diabetes mellitus screening  -     Comprehensive metabolic panel; Future; Expected date: 12/27/2019    Prostate cancer screening  -     PSA, Screening; Future; Expected date: 12/27/2019      Follow up in about 3 months (around 3/27/2020) for fasting labs and WELLNESS EXAM.     Patient's Medications   New Prescriptions    No medications on file   Previous Medications    AMLODIPINE (NORVASC) 10 MG TABLET    Take 1 tablet (10 mg total) by mouth once daily.    DOXAZOSIN (CARDURA) 8 MG TAB    Take 1 tablet (8 mg total) by mouth once daily.    ESOMEPRAZOLE (NEXIUM) 40 MG CAPSULE    Take 1 capsule (40 mg total) by mouth once daily.    FISH OIL-OMEGA-3 FATTY ACIDS 300-1,000 MG CAPSULE    Take 1 g by mouth once daily.    LISINOPRIL (PRINIVIL,ZESTRIL) 40 MG TABLET    Take 1 tablet (40 mg total) by mouth once daily.    NIACIN 500 MG TAB    Take 1 tablet (500 mg total) by mouth every evening.    TRIAMTERENE-HYDROCHLOROTHIAZIDE 37.5-25 MG (MAXZIDE-25) 37.5-25 MG PER TABLET    Take 1 tablet by mouth once daily.   Modified Medications    Modified Medication Previous Medication    ADDERALL XR 30 MG 24 HR CAPSULE ADDERALL XR 30 mg 24 hr capsule       Take 2 capsules (60 mg total) by mouth every morning. BRAND ONLY    Take 2 capsules (60 mg total) by mouth every morning.    PRAVASTATIN (PRAVACHOL) 20 MG TABLET pravastatin (PRAVACHOL) 20 MG tablet       Take 1 tablet (20 mg total) by mouth once daily.    Take 1 tablet  (20 mg total) by mouth once daily.   Discontinued Medications    No medications on file

## 2020-03-19 ENCOUNTER — PATIENT MESSAGE (OUTPATIENT)
Dept: FAMILY MEDICINE | Facility: CLINIC | Age: 55
End: 2020-03-19

## 2020-03-19 NOTE — TELEPHONE ENCOUNTER
Juliet - this patient would like to change his appt on 3/26 to a virtual visit for ADHD check ONLY and reschedule his fasting labs and WELLNESS visit for 3 months from now.    Please schedule all of the above. You can respond to the my chart message when done advising him that virtual visit ready for Thursday and what the new date and times are for labs and wellness in 3 months - he likes labs on Saturday and my latest visit for WELLNESS

## 2020-03-26 ENCOUNTER — OFFICE VISIT (OUTPATIENT)
Dept: FAMILY MEDICINE | Facility: CLINIC | Age: 55
End: 2020-03-26
Payer: COMMERCIAL

## 2020-03-26 DIAGNOSIS — Z20.822 CLOSE EXPOSURE TO COVID-19 VIRUS: ICD-10-CM

## 2020-03-26 DIAGNOSIS — Z11.4 SCREENING FOR HIV WITHOUT PRESENCE OF RISK FACTORS: ICD-10-CM

## 2020-03-26 DIAGNOSIS — F90.0 ADHD (ATTENTION DEFICIT HYPERACTIVITY DISORDER), INATTENTIVE TYPE: Primary | ICD-10-CM

## 2020-03-26 PROCEDURE — 99213 PR OFFICE/OUTPT VISIT, EST, LEVL III, 20-29 MIN: ICD-10-PCS | Mod: 95,,, | Performed by: NURSE PRACTITIONER

## 2020-03-26 PROCEDURE — 99213 OFFICE O/P EST LOW 20 MIN: CPT | Mod: 95,,, | Performed by: NURSE PRACTITIONER

## 2020-03-26 RX ORDER — DEXTROAMPHETAMINE SULFATE, DEXTROAMPHETAMINE SACCHARATE, AMPHETAMINE SULFATE AND AMPHETAMINE ASPARTATE 7.5; 7.5; 7.5; 7.5 MG/1; MG/1; MG/1; MG/1
60 CAPSULE, EXTENDED RELEASE ORAL EVERY MORNING
Qty: 180 CAPSULE | Refills: 0 | Status: SHIPPED | OUTPATIENT
Start: 2020-03-26 | End: 2020-06-25 | Stop reason: SDUPTHER

## 2020-03-26 NOTE — PROGRESS NOTES
Subjective:       Patient ID: Nick Mcdonough Sr. is a 54 y.o. male.    Chief Complaint: ADHD and COVID-19 Concerns    The patient location is: home  The chief complaint leading to consultation is: ADHD follow up and just wanted to make note that work sent him home on Friday due to report of a co-worker with Covid-19 confirmed case - work told him to self quarantine for next 14 days - asymptomatic  Visit type: Virtual visit with synchronous audio and video  Total time spent with patient: 25  Each patient to whom he or she provides medical services by telemedicine is:  (1) informed of the relationship between the physician and patient and the respective role of any other health care provider with respect to management of the patient; and (2) notified that he or she may decline to receive medical services by telemedicine and may withdraw from such care at any time.    Notes:   Patient is a 54-year-old white male with hypertension, GERD, ADHD, tobacco user, fluid retention to lower extremities with venous stasis dermatitis, obesity and Hyperlipidemia that is here today for 3 month ADHD follow up.    Patient has ADHD that is stable on current regimen.  Patient is able to focus and complete tasks effectively.  No side effects reported. Vital Signs are stable.  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    All other chronic problems will be addressed at next visit in 3 months for fasting labs and full WELLNESS exam.    Patient reports exposure to COVID-19 by a coworker.  Patient reports he was sent home on Friday 3/20/2020 due to reported personal exposure.  He is being self-quarantined for 14 days from 3/20/2020 per work instructions.  He is asymptomatic per patient report.    Patient is to check blood pressure, heart rate and temperature from home sometime today and send me a message over portal with readings.      Current Outpatient Medications   Medication Sig  Dispense Refill    ADDERALL XR 30 mg 24 hr capsule Take 2 capsules (60 mg total) by mouth every morning. BRAND ONLY 180 capsule 0    amLODIPine (NORVASC) 10 MG tablet Take 1 tablet (10 mg total) by mouth once daily. 90 tablet 1    doxazosin (CARDURA) 8 MG Tab Take 1 tablet (8 mg total) by mouth once daily. 90 tablet 1    esomeprazole (NEXIUM) 40 MG capsule Take 1 capsule (40 mg total) by mouth once daily. 90 capsule 1    fish oil-omega-3 fatty acids 300-1,000 mg capsule Take 1 g by mouth once daily.      lisinopril (PRINIVIL,ZESTRIL) 40 MG tablet Take 1 tablet (40 mg total) by mouth once daily. 90 tablet 1    niacin 500 MG Tab Take 1 tablet (500 mg total) by mouth every evening. (Patient taking differently: Take 1,000 mg by mouth every evening. ) 90 tablet 1    pravastatin (PRAVACHOL) 20 MG tablet Take 1 tablet (20 mg total) by mouth once daily. 90 tablet 0    triamterene-hydrochlorothiazide 37.5-25 mg (MAXZIDE-25) 37.5-25 mg per tablet Take 1 tablet by mouth once daily. 90 tablet 1     No current facility-administered medications for this visit.        Past Medical History:   Diagnosis Date    Adult ADHD     Alcohol dependency 2015    last alcohol intake 2015    Colon polyp 2018    2 polyps - tubular adenoma - repeat colonoscopy in 5 years    GERD (gastroesophageal reflux disease)     Hyperlipidemia     Hypertension        Past Surgical History:   Procedure Laterality Date    COLONOSCOPY N/A 2018    Procedure: COLONOSCOPY;  Surgeon: Eduard Medley MD;  Location: [facility];  Service: Endoscopy;  Laterality: N/A;    HERNIA REPAIR      INGUINAL HERNIA REPAIR Left     done at [facility]    KNEE SURGERY Right     VASECTOMY         Family History   Problem Relation Age of Onset    Hypertension Father     Heart disease Brother         acute MI -  at at 46    Cancer Maternal Grandmother     No Known Problems Maternal Grandfather     Heart disease Paternal Grandmother      Alcohol abuse Paternal Grandfather        Social History     Socioeconomic History    Marital status:      Spouse name: Not on file    Number of children: Not on file    Years of education: Not on file    Highest education level: Not on file   Occupational History    Not on file   Social Needs    Financial resource strain: Not very hard    Food insecurity:     Worry: Never true     Inability: Never true    Transportation needs:     Medical: No     Non-medical: No   Tobacco Use    Smoking status: Current Every Day Smoker     Packs/day: 0.50     Years: 30.00     Pack years: 15.00    Smokeless tobacco: Never Used   Substance and Sexual Activity    Alcohol use: No     Frequency: 2-3 times a week     Drinks per session: 3 or 4     Binge frequency: Monthly    Drug use: No    Sexual activity: Yes     Partners: Female   Lifestyle    Physical activity:     Days per week: 2 days     Minutes per session: 30 min    Stress: To some extent   Relationships    Social connections:     Talks on phone: Three times a week     Gets together: Once a week     Attends [employer] service: Not on file     Active member of club or organization: No     Attends meetings of clubs or organizations: Never     Relationship status:    Other Topics Concern    Not on file   Social History Narrative    Not on file       Review of Systems   Constitutional: Negative for activity change and unexpected weight change.   HENT: Negative for hearing loss, rhinorrhea and trouble swallowing.    Eyes: Negative for discharge and visual disturbance.   Respiratory: Negative for chest tightness and wheezing.    Cardiovascular: Negative for chest pain and palpitations.   Gastrointestinal: Negative for blood in stool, constipation, diarrhea and vomiting.   Endocrine: Negative for polydipsia and polyuria.   Genitourinary: Negative for difficulty urinating, hematuria and urgency.   Musculoskeletal: Negative for arthralgias, joint  swelling and neck pain.   Neurological: Negative for weakness and headaches.   Psychiatric/Behavioral: Negative for confusion and dysphoric mood.         Objective:     There were no vitals filed for this visit.       Physical Exam   Constitutional: He is oriented to person, place, and time. He appears well-developed and well-nourished. No distress.   Eyes: Pupils are equal, round, and reactive to light. Conjunctivae and EOM are normal. Right eye exhibits no discharge. Left eye exhibits no discharge. No scleral icterus.   Neck: Normal range of motion.   Pulmonary/Chest: Effort normal. No respiratory distress.   Neurological: He is alert and oriented to person, place, and time.   Skin: He is not diaphoretic.   Psychiatric: He has a normal mood and affect. His behavior is normal. Judgment and thought content normal.         Assessment:         ICD-10-CM ICD-9-CM   1. ADHD (attention deficit hyperactivity disorder), inattentive type F90.0 314.00   2. Close Exposure to Covid-19 Virus Z20.828    3. Screening for HIV without presence of risk factors Z11.4 V73.89       Plan:       ADHD (attention deficit hyperactivity disorder), inattentive type  -  Controlled on present medication and dose - recheck in 3 months.  -     ADDERALL XR 30 mg 24 hr capsule; Take 2 capsules (60 mg total) by mouth every morning. BRAND ONLY  Dispense: 180 capsule; Refill: 0    Close Exposure to Covid-19 Virus  -  Self quarantine at home and monitor temperature daily.  Contact office if temperature > 100.4    Screening for HIV without presence of risk factors  -     HIV 1/2 Ag/Ab (4th Gen); Future; Expected date: 03/26/2020      Follow up in about 3 months (around 6/24/2020) for fasting labs and WELLNESS exam.     Patient's Medications   New Prescriptions    No medications on file   Previous Medications    AMLODIPINE (NORVASC) 10 MG TABLET    Take 1 tablet (10 mg total) by mouth once daily.    DOXAZOSIN (CARDURA) 8 MG TAB    Take 1 tablet (8 mg  total) by mouth once daily.    ESOMEPRAZOLE (NEXIUM) 40 MG CAPSULE    Take 1 capsule (40 mg total) by mouth once daily.    FISH OIL-OMEGA-3 FATTY ACIDS 300-1,000 MG CAPSULE    Take 1 g by mouth once daily.    LISINOPRIL (PRINIVIL,ZESTRIL) 40 MG TABLET    Take 1 tablet (40 mg total) by mouth once daily.    NIACIN 500 MG TAB    Take 1 tablet (500 mg total) by mouth every evening.    PRAVASTATIN (PRAVACHOL) 20 MG TABLET    Take 1 tablet (20 mg total) by mouth once daily.    TRIAMTERENE-HYDROCHLOROTHIAZIDE 37.5-25 MG (MAXZIDE-25) 37.5-25 MG PER TABLET    Take 1 tablet by mouth once daily.   Modified Medications    Modified Medication Previous Medication    ADDERALL XR 30 MG 24 HR CAPSULE ADDERALL XR 30 mg 24 hr capsule       Take 2 capsules (60 mg total) by mouth every morning. BRAND ONLY    Take 2 capsules (60 mg total) by mouth every morning. BRAND ONLY   Discontinued Medications    No medications on file

## 2020-03-31 ENCOUNTER — TELEPHONE (OUTPATIENT)
Dept: FAMILY MEDICINE | Facility: CLINIC | Age: 55
End: 2020-03-31

## 2020-03-31 DIAGNOSIS — E78.5 HYPERLIPIDEMIA, UNSPECIFIED HYPERLIPIDEMIA TYPE: ICD-10-CM

## 2020-03-31 DIAGNOSIS — I10 ESSENTIAL HYPERTENSION: ICD-10-CM

## 2020-03-31 DIAGNOSIS — K21.9 GASTROESOPHAGEAL REFLUX DISEASE, ESOPHAGITIS PRESENCE NOT SPECIFIED: ICD-10-CM

## 2020-03-31 RX ORDER — DOXAZOSIN 8 MG/1
8 TABLET ORAL DAILY
Qty: 90 TABLET | Refills: 1 | Status: CANCELLED | OUTPATIENT
Start: 2020-03-31

## 2020-03-31 RX ORDER — AMLODIPINE BESYLATE 10 MG/1
10 TABLET ORAL DAILY
Qty: 90 TABLET | Refills: 0 | Status: SHIPPED | OUTPATIENT
Start: 2020-03-31 | End: 2020-09-22 | Stop reason: SDUPTHER

## 2020-03-31 RX ORDER — TRIAMTERENE/HYDROCHLOROTHIAZID 37.5-25 MG
1 TABLET ORAL DAILY
Qty: 90 TABLET | Refills: 0 | Status: SHIPPED | OUTPATIENT
Start: 2020-03-31 | End: 2020-08-03 | Stop reason: SDUPTHER

## 2020-03-31 RX ORDER — ESOMEPRAZOLE MAGNESIUM 40 MG/1
40 CAPSULE, DELAYED RELEASE ORAL DAILY
Qty: 90 CAPSULE | Refills: 0 | Status: SHIPPED | OUTPATIENT
Start: 2020-03-31 | End: 2020-08-03 | Stop reason: SDUPTHER

## 2020-03-31 RX ORDER — DOXAZOSIN 8 MG/1
8 TABLET ORAL DAILY
Qty: 90 TABLET | Refills: 0 | Status: SHIPPED | OUTPATIENT
Start: 2020-03-31 | End: 2020-10-13 | Stop reason: SDUPTHER

## 2020-03-31 RX ORDER — PRAVASTATIN SODIUM 20 MG/1
20 TABLET ORAL DAILY
Qty: 90 TABLET | Refills: 0 | Status: SHIPPED | OUTPATIENT
Start: 2020-03-31 | End: 2020-06-25 | Stop reason: ALTCHOICE

## 2020-03-31 RX ORDER — LISINOPRIL 40 MG/1
40 TABLET ORAL DAILY
Qty: 90 TABLET | Refills: 0 | Status: SHIPPED | OUTPATIENT
Start: 2020-03-31 | End: 2020-09-22 | Stop reason: SDUPTHER

## 2020-03-31 RX ORDER — AMLODIPINE BESYLATE 10 MG/1
10 TABLET ORAL DAILY
Qty: 90 TABLET | Refills: 1 | Status: CANCELLED | OUTPATIENT
Start: 2020-03-31

## 2020-03-31 NOTE — TELEPHONE ENCOUNTER
Receive confirmation from insurance Adderall XR is approve from 03/31/2020 through 03/31/2021 patient/pharmacy has been notified of approval.

## 2020-03-31 NOTE — TELEPHONE ENCOUNTER
----- Message from Joselyn Castañeda sent at 3/31/2020  8:15 AM CDT -----  adderall 30mg isn't on patient's insurance formulary anymore, requesting something else be sent in.

## 2020-03-31 NOTE — TELEPHONE ENCOUNTER
PA done per Giulia all questions answers  Reference# [id number] take to 5 business days we will be notify through fax. I call patient and advised him of this information.

## 2020-03-31 NOTE — TELEPHONE ENCOUNTER
Spoke with pharmacy was patient told was aware that this medication is not on his formulary and that if he needs this medication the Dr will need to submit a formulary exception(PA) to insurance.

## 2020-03-31 NOTE — TELEPHONE ENCOUNTER
Call patient insurance:  [phone] or [phone]    Need to get Prior Authorization for Adderall XR    Member ID:  [id number]  Group #:  [id number]    If you can get them on phone to do phone review prior authorization - you can come get me and I can answer their questions.

## 2020-05-27 ENCOUNTER — PATIENT MESSAGE (OUTPATIENT)
Dept: FAMILY MEDICINE | Facility: CLINIC | Age: 55
End: 2020-05-27

## 2020-05-27 DIAGNOSIS — H91.90 DECREASED HEARING, UNSPECIFIED LATERALITY: Primary | ICD-10-CM

## 2020-05-27 DIAGNOSIS — H93.19 TINNITUS, UNSPECIFIED LATERALITY: ICD-10-CM

## 2020-05-29 ENCOUNTER — CLINICAL SUPPORT (OUTPATIENT)
Dept: AUDIOLOGY | Facility: CLINIC | Age: 55
End: 2020-05-29
Payer: COMMERCIAL

## 2020-05-29 ENCOUNTER — OFFICE VISIT (OUTPATIENT)
Dept: OTOLARYNGOLOGY | Facility: CLINIC | Age: 55
End: 2020-05-29
Payer: COMMERCIAL

## 2020-05-29 DIAGNOSIS — H90.3 SENSORINEURAL HEARING LOSS, BILATERAL: Primary | ICD-10-CM

## 2020-05-29 DIAGNOSIS — H90.3 SENSORINEURAL HEARING LOSS (SNHL) OF BOTH EARS: Primary | ICD-10-CM

## 2020-05-29 DIAGNOSIS — H93.13 TINNITUS, BILATERAL: ICD-10-CM

## 2020-05-29 PROCEDURE — 92567 TYMPANOMETRY: CPT | Mod: S$GLB,,, | Performed by: AUDIOLOGIST-HEARING AID FITTER

## 2020-05-29 PROCEDURE — 99999 PR PBB SHADOW E&M-EST. PATIENT-LVL I: CPT | Mod: PBBFAC,,, | Performed by: AUDIOLOGIST-HEARING AID FITTER

## 2020-05-29 PROCEDURE — 99999 PR PBB SHADOW E&M-EST. PATIENT-LVL II: ICD-10-PCS | Mod: PBBFAC,,, | Performed by: NURSE PRACTITIONER

## 2020-05-29 PROCEDURE — 99499 NO LOS: ICD-10-PCS | Mod: S$GLB,,, | Performed by: AUDIOLOGIST

## 2020-05-29 PROCEDURE — 92557 PR COMPREHENSIVE HEARING TEST: ICD-10-PCS | Mod: S$GLB,,, | Performed by: AUDIOLOGIST-HEARING AID FITTER

## 2020-05-29 PROCEDURE — 99999 PR PBB SHADOW E&M-EST. PATIENT-LVL I: ICD-10-PCS | Mod: PBBFAC,,, | Performed by: AUDIOLOGIST-HEARING AID FITTER

## 2020-05-29 PROCEDURE — 99999 PR PBB SHADOW E&M-EST. PATIENT-LVL II: CPT | Mod: PBBFAC,,, | Performed by: NURSE PRACTITIONER

## 2020-05-29 PROCEDURE — 92557 COMPREHENSIVE HEARING TEST: CPT | Mod: S$GLB,,, | Performed by: AUDIOLOGIST-HEARING AID FITTER

## 2020-05-29 PROCEDURE — 99203 PR OFFICE/OUTPT VISIT, NEW, LEVL III, 30-44 MIN: ICD-10-PCS | Mod: S$GLB,,, | Performed by: NURSE PRACTITIONER

## 2020-05-29 PROCEDURE — 99499 UNLISTED E&M SERVICE: CPT | Mod: S$GLB,,, | Performed by: AUDIOLOGIST

## 2020-05-29 PROCEDURE — 92567 PR TYMPA2METRY: ICD-10-PCS | Mod: S$GLB,,, | Performed by: AUDIOLOGIST-HEARING AID FITTER

## 2020-05-29 PROCEDURE — 99203 OFFICE O/P NEW LOW 30 MIN: CPT | Mod: S$GLB,,, | Performed by: NURSE PRACTITIONER

## 2020-05-29 NOTE — PROGRESS NOTES
Nick Mcdonough Sr. was seen today for a hearing aid consult. Based on his lifestyle it was recommended he be fit with the followin Nicole Quezada (2400 or ) EDWARD Antoine  Size 3 MP Receivers  Medium Open Domes    Nick Mcdonough Sr. will call back when he is ready to proceed with a purchase. Must decide on which level of technology.

## 2020-05-29 NOTE — LETTER
May 29, 2020      [place]  [address] [address]  [address]  [place] [address]           [facility] Otorhinol[facility]ology  [address]  [facility] [address]  Phone: [phone]  Fax: [phone]          Patient: Nick Mcdonough Sr.   MR Number: [id number]   YOB: 1965   Date of Visit: 5/29/2020       Dear Giulia Solis:    Thank you for referring Nick Mcdonough to me for evaluation. Attached you will find relevant portions of my assessment and plan of care.    If you have questions, please do not hesitate to call me. I look forward to following Nick Mcdonough along with you.    Sincerely,    Fidencio Foley, NP    Enclosure  CC:  No Recipients    If you would like to receive this communication electronically, please contact [email or web address] or [phone] to request more information on [email or web address] Link access.    For providers and/or their staff who would like to refer a patient to [facility], please contact us through our one-stop-shop provider referral line, [facility], at [phone].    If you feel you have received this communication in error or would no longer like to receive these types of communications, please e-mail [email or web address]

## 2020-06-25 ENCOUNTER — OFFICE VISIT (OUTPATIENT)
Dept: FAMILY MEDICINE | Facility: CLINIC | Age: 55
End: 2020-06-25
Payer: COMMERCIAL

## 2020-06-25 VITALS
DIASTOLIC BLOOD PRESSURE: 80 MMHG | TEMPERATURE: 98 F | HEIGHT: 70 IN | BODY MASS INDEX: 36.26 KG/M2 | HEART RATE: 83 BPM | SYSTOLIC BLOOD PRESSURE: 136 MMHG | WEIGHT: 253.31 LBS | OXYGEN SATURATION: 97 %

## 2020-06-25 DIAGNOSIS — K21.9 GASTROESOPHAGEAL REFLUX DISEASE, ESOPHAGITIS PRESENCE NOT SPECIFIED: ICD-10-CM

## 2020-06-25 DIAGNOSIS — I10 ESSENTIAL HYPERTENSION: ICD-10-CM

## 2020-06-25 DIAGNOSIS — F90.0 ADHD (ATTENTION DEFICIT HYPERACTIVITY DISORDER), INATTENTIVE TYPE: ICD-10-CM

## 2020-06-25 DIAGNOSIS — Z00.00 ANNUAL PHYSICAL EXAM: Primary | ICD-10-CM

## 2020-06-25 DIAGNOSIS — E78.5 HYPERLIPIDEMIA, UNSPECIFIED HYPERLIPIDEMIA TYPE: ICD-10-CM

## 2020-06-25 DIAGNOSIS — E66.01 CLASS 2 SEVERE OBESITY WITH SERIOUS COMORBIDITY AND BODY MASS INDEX (BMI) OF 36.0 TO 36.9 IN ADULT, UNSPECIFIED OBESITY TYPE: ICD-10-CM

## 2020-06-25 DIAGNOSIS — M75.41 SHOULDER IMPINGEMENT SYNDROME, RIGHT: ICD-10-CM

## 2020-06-25 PROBLEM — E66.812 CLASS 2 SEVERE OBESITY WITH SERIOUS COMORBIDITY AND BODY MASS INDEX (BMI) OF 36.0 TO 36.9 IN ADULT: Status: ACTIVE | Noted: 2019-03-15

## 2020-06-25 PROCEDURE — 99213 PR OFFICE/OUTPT VISIT, EST, LEVL III, 20-29 MIN: ICD-10-PCS | Mod: 25,S$GLB,, | Performed by: NURSE PRACTITIONER

## 2020-06-25 PROCEDURE — 3075F PR MOST RECENT SYSTOLIC BLOOD PRESS GE 130-139MM HG: ICD-10-PCS | Mod: CPTII,S$GLB,, | Performed by: NURSE PRACTITIONER

## 2020-06-25 PROCEDURE — 99999 PR PBB SHADOW E&M-EST. PATIENT-LVL V: CPT | Mod: PBBFAC,,, | Performed by: NURSE PRACTITIONER

## 2020-06-25 PROCEDURE — 3079F DIAST BP 80-89 MM HG: CPT | Mod: CPTII,S$GLB,, | Performed by: NURSE PRACTITIONER

## 2020-06-25 PROCEDURE — 99213 OFFICE O/P EST LOW 20 MIN: CPT | Mod: 25,S$GLB,, | Performed by: NURSE PRACTITIONER

## 2020-06-25 PROCEDURE — 3079F PR MOST RECENT DIASTOLIC BLOOD PRESSURE 80-89 MM HG: ICD-10-PCS | Mod: CPTII,S$GLB,, | Performed by: NURSE PRACTITIONER

## 2020-06-25 PROCEDURE — 3008F PR BODY MASS INDEX (BMI) DOCUMENTED: ICD-10-PCS | Mod: CPTII,S$GLB,, | Performed by: NURSE PRACTITIONER

## 2020-06-25 PROCEDURE — 99999 PR PBB SHADOW E&M-EST. PATIENT-LVL V: ICD-10-PCS | Mod: PBBFAC,,, | Performed by: NURSE PRACTITIONER

## 2020-06-25 PROCEDURE — 99396 PR PREVENTIVE VISIT,EST,40-64: ICD-10-PCS | Mod: S$GLB,,, | Performed by: NURSE PRACTITIONER

## 2020-06-25 PROCEDURE — 3008F BODY MASS INDEX DOCD: CPT | Mod: CPTII,S$GLB,, | Performed by: NURSE PRACTITIONER

## 2020-06-25 PROCEDURE — 3075F SYST BP GE 130 - 139MM HG: CPT | Mod: CPTII,S$GLB,, | Performed by: NURSE PRACTITIONER

## 2020-06-25 PROCEDURE — 99396 PREV VISIT EST AGE 40-64: CPT | Mod: S$GLB,,, | Performed by: NURSE PRACTITIONER

## 2020-06-25 RX ORDER — ATORVASTATIN CALCIUM 10 MG/1
10 TABLET, FILM COATED ORAL DAILY
Qty: 90 TABLET | Refills: 0 | Status: SHIPPED | OUTPATIENT
Start: 2020-06-25 | End: 2020-09-22 | Stop reason: SDUPTHER

## 2020-06-25 RX ORDER — DEXTROAMPHETAMINE SULFATE, DEXTROAMPHETAMINE SACCHARATE, AMPHETAMINE SULFATE AND AMPHETAMINE ASPARTATE 7.5; 7.5; 7.5; 7.5 MG/1; MG/1; MG/1; MG/1
60 CAPSULE, EXTENDED RELEASE ORAL EVERY MORNING
Qty: 180 CAPSULE | Refills: 0 | Status: SHIPPED | OUTPATIENT
Start: 2020-06-25 | End: 2020-09-17 | Stop reason: SDUPTHER

## 2020-06-25 NOTE — PROGRESS NOTES
"Subjective:       Patient ID: Nick Mcdonough Sr. is a 54 y.o. male.    Chief Complaint: Wellness (pt here for wellness vist/ had labs done ) and Shoulder Pain    Patient is a 54-year-old white male with hypertension, GERD, ADHD, tobacco user, fluid retention to lower extremities with venous stasis dermatitis, obesity and Hyperlipidemia that is here today for annual physical exam with fasting labs results AS WELL AS asking to also be seen for shoulder pain that I will address after the WELLNESS EXAM at bottom of note.    Patient has hypertension that is controlled on present medications, Lisinopril 40 mg daily, Maxzide 25 mg daily, Doxazosin 8 mg daily and Amlodipine 10 mg daily.  /80   Pulse 83   Temp 97.5 °F (36.4 °C) (Oral)   Ht 5' 10" (1.778 m)   Wt 114.9 kg (253 lb 4.9 oz)   SpO2 97%   BMI 36.35 kg/m²      Patient has GERD that is controlled on Nexium at present dose.     Patient has ADHD that is stable on current regimen.  Patient is able to focus and complete tasks effectively.  No side effects reported.     Patient is a smoker and declines smoking cessation program.     Patient has Hyperlipidemia. Cholesterol is uncontrolled on Pravastatin 20 mg daily as the triglycerides are high with low HDL and has intolerance to Rosuvastatin due to acute itching.  Will stop the Pravastatin and change to Atorvastatin to get better control.     Patient is severely obese with Body mass index  36.35 kg/m².     Wellness labs:  -  CBC within acceptable range  -  CMP within acceptable range and kidney and liver function are within normal range  -  total cholesterol and LDL are controlled on present medication triglycerides are high with low HDL - see notes above  -  PSA levels normal  -  thyroid screening is normal     Health maintenance:  -  Advised to get Shingles vaccine at local pharmacy.     SEE ADDITIONAL NOTES BELOW FOR COMPLAINT OF SHOULDER PAIN:    Shoulder Pain   The pain is present in the right " shoulder. This is a new problem. Episode onset: 3 to 4 months ago. There has been no history of extremity trauma. The problem occurs 2 to 4 times per day. The problem has been waxing and waning. The quality of the pain is described as sharp. The pain is at a severity of 7/10. Associated symptoms include a limited range of motion and stiffness. Pertinent negatives include no fever or headaches. The symptoms are aggravated by activity. He has tried cold, heat, NSAIDS, acetaminophen and movement for the symptoms. The treatment provided mild relief. There is no history of Injuries to Extremity.         Component      Latest Ref Rng & Units 6/20/2020 9/28/2019 3/9/2019 11/26/2018   WBC      3.90 - 12.70 K/uL 7.02  8.71    RBC      4.60 - 6.20 M/uL 4.65  4.90    Hemoglobin      14.0 - 18.0 g/dL 15.0  15.6    Hematocrit      40.0 - 54.0 % 45.0  46.0    MCV      82 - 98 fL 97  94    MCH      27.0 - 31.0 pg 32.3 (H)  31.8 (H)    MCHC      32.0 - 36.0 g/dL 33.3  33.9    RDW      11.5 - 14.5 % 14.3  14.9 (H)    Platelets      150 - 350 K/uL 235  237    MPV      9.2 - 12.9 fL 10.8  11.0    Immature Granulocytes      0.0 - 0.5 % 1.0 (H)      Gran # (ANC)      1.8 - 7.7 K/uL 4.1  5.3    Immature Grans (Abs)      0.00 - 0.04 K/uL 0.07 (H)      Lymph #      1.0 - 4.8 K/uL 1.9  2.1    Mono #      0.3 - 1.0 K/uL 0.6  0.9    Eos #      0.0 - 0.5 K/uL 0.4  0.3    Baso #      0.00 - 0.20 K/uL 0.04  0.05    nRBC      0 /100 WBC 0      Gran%      38.0 - 73.0 % 57.6  61.0    Lymph%      18.0 - 48.0 % 27.4  24.5    Mono%      4.0 - 15.0 % 8.4  10.2    Eosinophil%      0.0 - 8.0 % 5.0  3.7    Basophil%      0.0 - 1.9 % 0.6  0.6    Differential Method       Automated  Automated    Sodium      136 - 145 mmol/L 143 139 141 141   Potassium      3.5 - 5.1 mmol/L 3.7 3.5 3.7 3.3 (L)   Chloride      95 - 110 mmol/L 109 104 103 107   CO2      23 - 29 mmol/L 26 27 26 25   Glucose      70 - 110 mg/dL 91 107 107 87   BUN, Bld      2 - 20 mg/dL 23 (H)  28 (H) 22 (H) 19   Creatinine      0.50 - 1.40 mg/dL 0.91 1.01 0.94 1.05   Calcium      8.7 - 10.5 mg/dL 9.2 9.3 9.2 9.5   PROTEIN TOTAL      6.0 - 8.4 g/dL 6.9 7.5 7.3 7.4   Albumin      3.5 - 5.2 g/dL 4.2 4.3 4.2 4.3   BILIRUBIN TOTAL      0.1 - 1.0 mg/dL 0.3 0.4 0.4 0.5   Alkaline Phosphatase      38 - 126 U/L 99 106 96 84   AST      15 - 46 U/L 38 40 35 40   ALT      10 - 44 U/L 33 40 43 47 (H)   Anion Gap      8 - 16 mmol/L 8 8 12 9   eGFR if African American      >60 mL/min/1.73 m:2 >60.0 >60.0 >60.0 >60.0   eGFR if non African American      >60 mL/min/1.73 m:2 >60.0 >60.0 >60.0 >60.0   Cholesterol      120 - 199 mg/dL 160 155 137 175   Triglycerides      30 - 150 mg/dL 243 (H) 194 (H) 243 (H) 135   HDL      40 - 75 mg/dL 29 (L) 36 (L) 29 (L) 35 (L)   LDL Cholesterol External      63.0 - 159.0 mg/dL 82.4 80.2 59.4 (L) 113.0   Hdl/Cholesterol Ratio      20.0 - 50.0 % 18.1 (L) 23.2 21.2 20.0   Total Cholesterol/HDL Ratio      2.0 - 5.0 5.5 (H) 4.3 4.7 5.0   Non-HDL Cholesterol      mg/dL 131 119 108 140   TSH      0.400 - 4.000 uIU/mL 2.280  2.580    PSA, SCREEN      0.00 - 4.00 ng/mL 0.68  0.83    HIV 1/2 Ag/Ab      Negative Negative        Current Outpatient Medications   Medication Sig Dispense Refill    ADDERALL XR 30 mg 24 hr capsule Take 2 capsules (60 mg total) by mouth every morning. BRAND ONLY 180 capsule 0    amLODIPine (NORVASC) 10 MG tablet Take 1 tablet (10 mg total) by mouth once daily. 90 tablet 0    doxazosin (CARDURA) 8 MG Tab Take 1 tablet (8 mg total) by mouth once daily. 90 tablet 0    esomeprazole (NEXIUM) 40 MG capsule Take 1 capsule (40 mg total) by mouth once daily. 90 capsule 0    fish oil-omega-3 fatty acids 300-1,000 mg capsule Take 1 g by mouth once daily.      lisinopriL (PRINIVIL,ZESTRIL) 40 MG tablet Take 1 tablet (40 mg total) by mouth once daily. 90 tablet 0    niacin 500 MG Tab Take 1 tablet (500 mg total) by mouth every evening. (Patient taking differently: Take 1,000 mg  by mouth every evening. ) 90 tablet 1    pravastatin (PRAVACHOL) 20 MG tablet Take 1 tablet (20 mg total) by mouth once daily. 90 tablet 0    triamterene-hydrochlorothiazide 37.5-25 mg (MAXZIDE-25) 37.5-25 mg per tablet Take 1 tablet by mouth once daily. 90 tablet 0     No current facility-administered medications for this visit.        Past Medical History:   Diagnosis Date    Adult ADHD     Alcohol dependency 2015    last alcohol intake 2015    Colon polyp 2018    2 polyps - tubular adenoma - repeat colonoscopy in 5 years    GERD (gastroesophageal reflux disease)     Hyperlipidemia     Hypertension        Past Surgical History:   Procedure Laterality Date    COLONOSCOPY N/A 2018    Procedure: COLONOSCOPY;  Surgeon: Eduard Medley MD;  [facility];  Service: Endoscopy;  Laterality: N/A;    HERNIA REPAIR      INGUINAL HERNIA REPAIR Left     done at [facility]    KNEE SURGERY Right     VASECTOMY         Family History   Problem Relation Age of Onset    Hypertension Father     Heart disease Brother         acute MI -  at at 46    Cancer Maternal Grandmother     No Known Problems Maternal Grandfather     Heart disease Paternal Grandmother     Alcohol abuse Paternal Grandfather        Social History     Socioeconomic History    Marital status:      Spouse name: Not on file    Number of children: Not on file    Years of education: Not on file    Highest education level: Not on file   Occupational History    Not on file   Social Needs    Financial resource strain: Not very hard    Food insecurity     Worry: Never true     Inability: Never true    Transportation needs     Medical: No     Non-medical: No   Tobacco Use    Smoking status: Current Every Day Smoker     Packs/day: 0.50     Years: 30.00     Pack years: 15.00    Smokeless tobacco: Never Used   Substance and Sexual Activity    Alcohol use: No     Frequency: 2-3 times a week     Drinks per  session: 3 or 4     Binge frequency: Monthly    Drug use: No    Sexual activity: Yes     Partners: Female   Lifestyle    Physical activity     Days per week: 2 days     Minutes per session: 30 min    Stress: To some extent   Relationships    Social connections     Talks on phone: Three times a week     Gets together: Once a week     Attends [employer] service: Not on file     Active member of club or organization: No     Attends meetings of clubs or organizations: Never     Relationship status:    Other Topics Concern    Not on file   Social History Narrative    Not on file       Review of Systems   Constitutional: Negative for activity change, appetite change, fatigue, fever and unexpected weight change.   HENT: Negative for congestion, ear pain, mouth sores, nosebleeds, postnasal drip, rhinorrhea, sinus pressure, sneezing, sore throat, trouble swallowing and voice change.    Eyes: Negative.    Respiratory: Negative for cough, chest tightness and shortness of breath.    Cardiovascular: Negative for chest pain, palpitations and leg swelling.   Gastrointestinal: Negative.  Negative for abdominal pain, blood in stool, constipation, diarrhea, nausea and vomiting.   Endocrine: Negative.    Genitourinary: Negative for difficulty urinating, dysuria, flank pain, hematuria and urgency.   Musculoskeletal: Positive for arthralgias, myalgias and stiffness. Negative for back pain, gait problem, joint swelling and neck pain.   Skin: Negative for color change, rash and wound.   Allergic/Immunologic: Negative for immunocompromised state.   Neurological: Negative for dizziness, tremors, seizures, syncope, speech difficulty and headaches.   Hematological: Negative for adenopathy. Does not bruise/bleed easily.   Psychiatric/Behavioral: Negative for behavioral problems, dysphoric mood, sleep disturbance and suicidal ideas. The patient is not nervous/anxious.          Objective:     Vitals:    06/25/20 1717   BP: 136/80  "  Pulse: 83   Temp: 97.5 °F (36.4 °C)   TempSrc: Oral   SpO2: 97%   Weight: 114.9 kg (253 lb 4.9 oz)   Height: 5' 10" (1.778 m)          Physical Exam  Constitutional:       Appearance: He is well-developed.      Comments: + obesity with Body mass index is 36.35 kg/m².         HENT:      Head: Normocephalic.      Right Ear: External ear normal.      Left Ear: External ear normal.      Nose: Nose normal.      Mouth/Throat:      Pharynx: No oropharyngeal exudate.   Eyes:      General: No scleral icterus.        Right eye: No discharge.         Left eye: No discharge.      Pupils: Pupils are equal, round, and reactive to light.   Neck:      Musculoskeletal: Normal range of motion and neck supple.      Thyroid: No thyromegaly.      Trachea: No tracheal deviation.   Cardiovascular:      Rate and Rhythm: Normal rate and regular rhythm.      Heart sounds: Normal heart sounds. No murmur.   Pulmonary:      Effort: Pulmonary effort is normal. No respiratory distress.      Breath sounds: Normal breath sounds.   Abdominal:      General: There is no distension.      Palpations: Abdomen is soft.   Genitourinary:     Comments: Declined rectal/prostate exam  Musculoskeletal:      Right shoulder: He exhibits decreased range of motion and pain. He exhibits no swelling and no effusion.        Arms:    Lymphadenopathy:      Cervical: No cervical adenopathy.   Skin:     General: Skin is warm and dry.      Findings: No rash.   Neurological:      Mental Status: He is alert and oriented to person, place, and time.      Coordination: Coordination normal.   Psychiatric:         Behavior: Behavior normal.           Assessment:         ICD-10-CM ICD-9-CM   1. Annual physical exam  Z00.00 V70.0   2. Essential hypertension  I10 401.9   3. Hyperlipidemia, unspecified hyperlipidemia type  E78.5 272.4   4. Gastroesophageal reflux disease, esophagitis presence not specified  K21.9 530.81   5. ADHD (attention deficit hyperactivity disorder), " inattentive type  F90.0 314.00   6. Class 2 severe obesity with serious comorbidity and body mass index (BMI) of 36.0 to 36.9 in adult, unspecified obesity type  E66.01 278.01    Z68.36 V85.36   7. Shoulder impingement syndrome, right  M75.41 726.2       Plan:       Annual physical exam  Health Maintenance Summary    Shingles Vaccine Postponed 10/2/2020 Originally 8/21/2016. Patient Refused   Colorectal Cancer Screening Next Due 2/2/2023    Lipid Panel Next Due 6/20/2025     Done 6/20/2020 LIPID PANEL     Done 9/28/2019 LIPID PANEL     Done 3/9/2019 LIPID PANEL     Done 11/26/2018 LIPID PANEL     Done 6/16/2018 LIPID PANEL     Patient has more history with this topic...   TETANUS VACCINE Next Due 7/30/2027     Done 7/30/2017 Imm Admin: Tdap    Done 9/22/2005 Imm Admin: Td (ADULT)   Pneumococcal Vaccine (Medium Risk) This plan is no longer active.     Done 3/12/2018 Imm Admin: Pneumococcal Polysaccharide - 23 Valent   Influenza Vaccine This plan is no longer active.     Done 10/2/2019 Imm Admin: Influenza - Quadrivalent - PF (6 months and older)    Done 9/19/2018 Imm Admin: Influenza - Quadrivalent - PF (6 months and older)    Done 9/22/2017 Imm Admin: Influenza - Quadrivalent - PF (6 months and older)    Done 9/10/2013 Imm Admin: Influenza - Intranasal   HIV Screening This plan is no longer active.     Done 6/20/2020 HIV 1 / 2 ANTIBODY         Essential hypertension  -  controlled on present medications.  Follow-up in 3 months  -     Comprehensive metabolic panel; Future; Expected date: 06/25/2020    Hyperlipidemia, unspecified hyperlipidemia type  -  stop the pravastatin change to starve a statin 10 mg.  Follow-up in 3 months for fasting labs and recheck on new medication.  -     Lipid Panel; Future; Expected date: 06/25/2020  -     atorvastatin (LIPITOR) 10 MG tablet; Take 1 tablet (10 mg total) by mouth once daily.  Dispense: 90 tablet; Refill: 0    Gastroesophageal reflux disease, esophagitis presence not  specified    ADHD (attention deficit hyperactivity disorder), inattentive type  -     ADDERALL XR 30 mg 24 hr capsule; Take 2 capsules (60 mg total) by mouth every morning. BRAND ONLY  Dispense: 180 capsule; Refill: 0    Class 2 severe obesity with serious comorbidity and body mass index (BMI) of 36.0 to 36.9 in adult, unspecified obesity type    Shoulder impingement syndrome, right  -  patient reports trying everything including a TENS unit without relief.  Will refer to orthopedics for further evaluation.  -     Ambulatory referral/consult to Orthopedics; Future; Expected date: 07/02/2020      Follow up in about 3 months (around 9/23/2020).     Patient's Medications   New Prescriptions    ATORVASTATIN (LIPITOR) 10 MG TABLET    Take 1 tablet (10 mg total) by mouth once daily.   Previous Medications    AMLODIPINE (NORVASC) 10 MG TABLET    Take 1 tablet (10 mg total) by mouth once daily.    DOXAZOSIN (CARDURA) 8 MG TAB    Take 1 tablet (8 mg total) by mouth once daily.    ESOMEPRAZOLE (NEXIUM) 40 MG CAPSULE    Take 1 capsule (40 mg total) by mouth once daily.    FISH OIL-OMEGA-3 FATTY ACIDS 300-1,000 MG CAPSULE    Take 1 g by mouth once daily.    LISINOPRIL (PRINIVIL,ZESTRIL) 40 MG TABLET    Take 1 tablet (40 mg total) by mouth once daily.    NIACIN 500 MG TAB    Take 1 tablet (500 mg total) by mouth every evening.    TRIAMTERENE-HYDROCHLOROTHIAZIDE 37.5-25 MG (MAXZIDE-25) 37.5-25 MG PER TABLET    Take 1 tablet by mouth once daily.   Modified Medications    Modified Medication Previous Medication    ADDERALL XR 30 MG 24 HR CAPSULE ADDERALL XR 30 mg 24 hr capsule       Take 2 capsules (60 mg total) by mouth every morning. BRAND ONLY    Take 2 capsules (60 mg total) by mouth every morning. BRAND ONLY   Discontinued Medications    PRAVASTATIN (PRAVACHOL) 20 MG TABLET    Take 1 tablet (20 mg total) by mouth once daily.

## 2020-06-25 NOTE — PATIENT INSTRUCTIONS
Understanding Shoulder Impingement Syndrome    Shoulder impingement syndrome is a problem with the shoulder joint. It occurs when certain parts within the joint swell and are pinched. This can cause nagging pain and problems with moving the arm.  What causes shoulder impingement syndrome?  It is possible to develop impingement after years of normal shoulder use. But in most cases the condition occurs because of repeated overhead movements. These include such things as stocking shelves, painting, swimming, and throwing. These movements can irritate parts of the shoulder, leading to swelling. Swollen parts of the shoulder take up more room, making the joint space smaller. Some parts that may be involved include:  · A sac of fluid (bursa) that cushions the shoulder joint.  When the bursa is irritated, it may lead to a condition called bursitis. This is when the bursa swells with fluid, filling and squeezing the joint space.  · Fibrous tissues (tendons) that connect muscle to bone. When tendons are irritated, they may become swollen. This is called tendonitis.  · The end (acromion) of the shoulder blade. This bone may be flat or hooked. If the acromion is hooked, the joint space may be smaller than normal. Growths (bone spurs) on the acromion can also narrow the joint space. Acromion problems dont often cause impingement. But they can make it worse.  Symptoms of shoulder impingement syndrome  Symptoms include pain, pinching, or stiffness in your shoulder. Pain often comes with movement, particularly reaching overhead or backward. It may also be felt when the shoulder is at rest. Pain at night during sleep is common.  Treatment for shoulder impingement syndrome  Treatment will depend on the cause of the problem, how bad the problem is, and if other parts of the shoulder are damaged. Treatment may include the following:  · Active rest. This allows the shoulder to heal. It means using the arm and shoulder, but avoiding  activities that cause pain. These likely include reaching overhead or sleeping on your shoulder.  · Cold packs. These help reduce swelling and relieve pain.  · Prescription or over-the-counter pain medicines. These help relieve pain and swelling.  · Arm and shoulder exercises. These help keep your shoulder joint mobile as it heals. They also help improve muscle strength around the joint.  · Shots of medicine into the joint. This can help reduce swelling and pain for a short time.  If other measures dont work to relieve symptoms, you may need surgery. This opens up space in the joint to allow pain-free motion.  Possible complications of shoulder impingement syndrome  It might be tempting to stop using your shoulder completely to avoid pain. But doing so may lead to a condition called frozen shoulder. To help prevent this, follow instructions you are given for active rest and for doing exercises to help your shoulder heal.  When to call your healthcare provider  Call your healthcare provider right away if you have any of these:  · Fever of 100.4°F (38°C) or higher, or as directed  · Symptoms that dont get better, or get worse  · New symptoms   Date Last Reviewed: 3/10/2016  © 2095-8067 [employer]. [address] [place] [address]. All rights reserved. This information is not intended as a substitute for professional medical care. Always follow your healthcare professional's instructions.      Nonsurgical Treatment Options for Shoulder Impingement    Rest is key to healing your shoulder. If an activity hurts, dont do it. Otherwise, you may prevent healing and increase pain. Your shoulder needs active rest. This means avoiding overhead movements and activities that cause pain. But DO NOT stop using your shoulder completely. This can cause it to stiffen or freeze. In addition to rest, impingement can be treated a number of ways. Your healthcare provider can help you find which of these is  best for you.  A physical therapist can also help you with exercises specific for your condition.  ? Ice  Ice reduces inflammation and relieves pain. Apply an ice pack for about 15 minutes, 3 times a day. You can also use a bag of frozen peas instead of an ice pack. A pillow placed under your arm may help make you more comfortable.  Note: Dont put the cold item directly on your skin. Place it on top of your shirt, or wrap it in a thin towel or washcloth.  ? Heat  Heat may soothe aching muscles, but it wont reduce inflammation. Use a heating pad or take a warm shower or bath. Do this for 15 minutes at a time.  Note: Avoid heat when pain is constant. Heat is best when used for warming up before an activity. You can also alternate ice and heat.  ? Medicine  To relieve pain and inflammation, try over-the-counter pain relievers, such as acetaminophen or ibuprofen. Or, your healthcare provider may prescribe medicines. Ask how and when to take your medicine. Be sure to follow all instructions youre given.  ? Electrical stimulation  Electrical stimulation can help reduce pain and swelling. Your healthcare provider attaches small pads to your shoulder. A mild electric current then flows into your shoulder. You may feel tingling, but you should not feel pain.  ? Ultrasound  Ultrasound can help reduce pain. First a slick gel or medicated cream is applied to your shoulder. Then your healthcare provider places a small device over the area. The device uses sound waves to loosen shoulder tightness. This treatment should be pain-free.  ? Injection therapy  Injection therapy may be used to help diagnose your problem. It may also be used to reduce pain and inflammation. The injection typically includes two medicines. One is an anesthetic to numb the shoulder. The other is a steroid, such as cortisone, to help reduce painful swelling. It can take from a few hours to a couple of days before the injection helps. Talk to  your healthcare provider about the possible risks and benefits of this therapy.  Date Last Reviewed: 10/14/2015  © 3902-6132 [employer]. [address] [place] [address], [place] [address]. All rights reserved. This information is not intended as a substitute for professional medical care. Always follow your healthcare professional's instructions.

## 2020-07-06 ENCOUNTER — HOSPITAL ENCOUNTER (OUTPATIENT)
Dept: RADIOLOGY | Facility: HOSPITAL | Age: 55
Discharge: HOME | End: 2020-07-06
Attending: PHYSICIAN ASSISTANT
Payer: COMMERCIAL

## 2020-07-06 ENCOUNTER — OFFICE VISIT (OUTPATIENT)
Dept: ORTHOPEDICS | Facility: CLINIC | Age: 55
End: 2020-07-06
Payer: COMMERCIAL

## 2020-07-06 VITALS
SYSTOLIC BLOOD PRESSURE: 146 MMHG | WEIGHT: 252.56 LBS | BODY MASS INDEX: 36.16 KG/M2 | DIASTOLIC BLOOD PRESSURE: 92 MMHG | HEART RATE: 68 BPM | HEIGHT: 70 IN

## 2020-07-06 DIAGNOSIS — M75.41 SHOULDER IMPINGEMENT SYNDROME, RIGHT: ICD-10-CM

## 2020-07-06 DIAGNOSIS — M25.512 BILATERAL SHOULDER PAIN, UNSPECIFIED CHRONICITY: ICD-10-CM

## 2020-07-06 DIAGNOSIS — M25.511 BILATERAL SHOULDER PAIN, UNSPECIFIED CHRONICITY: ICD-10-CM

## 2020-07-06 DIAGNOSIS — M25.511 RIGHT SHOULDER PAIN, UNSPECIFIED CHRONICITY: ICD-10-CM

## 2020-07-06 DIAGNOSIS — M25.511 RIGHT SHOULDER PAIN, UNSPECIFIED CHRONICITY: Primary | ICD-10-CM

## 2020-07-06 PROCEDURE — 3008F BODY MASS INDEX DOCD: CPT | Mod: CPTII,S$GLB,, | Performed by: PHYSICIAN ASSISTANT

## 2020-07-06 PROCEDURE — 73030 X-RAY EXAM OF SHOULDER: CPT | Mod: TC,50

## 2020-07-06 PROCEDURE — 20610 PR DRAIN/INJECT LARGE JOINT/BURSA: ICD-10-PCS | Mod: RT,S$GLB,, | Performed by: PHYSICIAN ASSISTANT

## 2020-07-06 PROCEDURE — 73030 X-RAY EXAM OF SHOULDER: CPT | Mod: 26,50,, | Performed by: RADIOLOGY

## 2020-07-06 PROCEDURE — 3008F PR BODY MASS INDEX (BMI) DOCUMENTED: ICD-10-PCS | Mod: CPTII,S$GLB,, | Performed by: PHYSICIAN ASSISTANT

## 2020-07-06 PROCEDURE — 20610 DRAIN/INJ JOINT/BURSA W/O US: CPT | Mod: RT,S$GLB,, | Performed by: PHYSICIAN ASSISTANT

## 2020-07-06 PROCEDURE — 3077F SYST BP >= 140 MM HG: CPT | Mod: CPTII,S$GLB,, | Performed by: PHYSICIAN ASSISTANT

## 2020-07-06 PROCEDURE — 99203 OFFICE O/P NEW LOW 30 MIN: CPT | Mod: 25,S$GLB,, | Performed by: PHYSICIAN ASSISTANT

## 2020-07-06 PROCEDURE — 99999 PR PBB SHADOW E&M-EST. PATIENT-LVL IV: ICD-10-PCS | Mod: PBBFAC,,, | Performed by: PHYSICIAN ASSISTANT

## 2020-07-06 PROCEDURE — 3080F DIAST BP >= 90 MM HG: CPT | Mod: CPTII,S$GLB,, | Performed by: PHYSICIAN ASSISTANT

## 2020-07-06 PROCEDURE — 3077F PR MOST RECENT SYSTOLIC BLOOD PRESSURE >= 140 MM HG: ICD-10-PCS | Mod: CPTII,S$GLB,, | Performed by: PHYSICIAN ASSISTANT

## 2020-07-06 PROCEDURE — 3080F PR MOST RECENT DIASTOLIC BLOOD PRESSURE >= 90 MM HG: ICD-10-PCS | Mod: CPTII,S$GLB,, | Performed by: PHYSICIAN ASSISTANT

## 2020-07-06 PROCEDURE — 73030 XR SHOULDER COMPLETE 2 OR MORE VIEWS BILATERAL: ICD-10-PCS | Mod: 26,50,, | Performed by: RADIOLOGY

## 2020-07-06 PROCEDURE — 99203 PR OFFICE/OUTPT VISIT, NEW, LEVL III, 30-44 MIN: ICD-10-PCS | Mod: 25,S$GLB,, | Performed by: PHYSICIAN ASSISTANT

## 2020-07-06 PROCEDURE — 99999 PR PBB SHADOW E&M-EST. PATIENT-LVL IV: CPT | Mod: PBBFAC,,, | Performed by: PHYSICIAN ASSISTANT

## 2020-07-06 RX ORDER — BETAMETHASONE SODIUM PHOSPHATE AND BETAMETHASONE ACETATE 3; 3 MG/ML; MG/ML
6 INJECTION, SUSPENSION INTRA-ARTICULAR; INTRALESIONAL; INTRAMUSCULAR; SOFT TISSUE
Status: COMPLETED | OUTPATIENT
Start: 2020-07-06 | End: 2020-07-06

## 2020-07-06 RX ADMIN — BETAMETHASONE SODIUM PHOSPHATE AND BETAMETHASONE ACETATE 6 MG: 3; 3 INJECTION, SUSPENSION INTRA-ARTICULAR; INTRALESIONAL; INTRAMUSCULAR; SOFT TISSUE at 12:07

## 2020-07-06 NOTE — PROGRESS NOTES
SUBJECTIVE:     Chief Complaint & History of Present Illness:  Nick Mcdonough Sr. is a  New  patient 54 y.o. male who is seen here today with a complaint of    Chief Complaint   Patient presents with    Left Shoulder - Pain    Right Shoulder - Pain    .  Patient is here today for evaluation treatment of progressively worsening pain soreness in the right shoulder over the past 2 months.  Does not remember a specific trauma or injury to the area but does do quite a bit of activity including lifting and caring.  He has treated with over-the-counter NSAIDs with little to no relief.  States that does have some impact on his sleep when he rolls on to the right shoulder.  Over the past few days has begun to notice a little bit of soreness in the left he feels may be related to compensation  On a scale of 1-10, with 10 being worst pain imaginable, he rates this pain as 3 on good days and 7 on bad days.  he describes the pain as tender and sore.    Review of patient's allergies indicates:   Allergen Reactions    Pcn [penicillins]          Current Outpatient Medications   Medication Sig Dispense Refill    ADDERALL XR 30 mg 24 hr capsule Take 2 capsules (60 mg total) by mouth every morning. BRAND ONLY 180 capsule 0    amLODIPine (NORVASC) 10 MG tablet Take 1 tablet (10 mg total) by mouth once daily. 90 tablet 0    atorvastatin (LIPITOR) 10 MG tablet Take 1 tablet (10 mg total) by mouth once daily. 90 tablet 0    doxazosin (CARDURA) 8 MG Tab Take 1 tablet (8 mg total) by mouth once daily. 90 tablet 0    esomeprazole (NEXIUM) 40 MG capsule Take 1 capsule (40 mg total) by mouth once daily. 90 capsule 0    fish oil-omega-3 fatty acids 300-1,000 mg capsule Take 1 g by mouth once daily.      lisinopriL (PRINIVIL,ZESTRIL) 40 MG tablet Take 1 tablet (40 mg total) by mouth once daily. 90 tablet 0    niacin 500 MG Tab Take 1 tablet (500 mg total) by mouth every evening. (Patient taking differently: Take 1,000 mg by mouth  "every evening. ) 90 tablet 1    triamterene-hydrochlorothiazide 37.5-25 mg (MAXZIDE-25) 37.5-25 mg per tablet Take 1 tablet by mouth once daily. 90 tablet 0     No current facility-administered medications for this visit.        Past Medical History:   Diagnosis Date    Adult ADHD     Alcohol dependency 9/2015    last alcohol intake September 2015    Colon polyp 02/02/2018    2 polyps - tubular adenoma - repeat colonoscopy in 5 years    GERD (gastroesophageal reflux disease)     Hyperlipidemia     Hypertension        Past Surgical History:   Procedure Laterality Date    COLONOSCOPY N/A 2/2/2018    Procedure: COLONOSCOPY;  Surgeon: Eduard Medley MD;  Location: [facility] END[facility];  Service: Endoscopy;  Laterality: N/A;    HERNIA REPAIR      INGUINAL HERNIA REPAIR Left 2012    [facility]    KNEE SURGERY Right     VASECTOMY         Vital Signs (Most Recent)  Vitals:    07/06/20 0826   BP: (!) 146/92   Pulse: 68       Review of Systems:  ROS:  Constitutional: no fever or chills  Eyes: no visual changes  ENT: no nasal congestion or sore throat  Respiratory: no cough or shortness of breath, Positive history of chlorine gas exposure, tobacco user  Cardiovascular: no chest pain or palpitations  Gastrointestinal: no nausea or vomiting, tolerating diet, Positive for GERD  Genitourinary: no hematuria or dysuria  Integument/Breast: no rash or pruritis  Hematologic/Lymphatic: no easy bruising or lymphadenopathy  Musculoskeletal: no arthralgias or myalgias  Neurological: no seizures or tremors  Behavioral/Psych: no auditory or visual hallucinations, Positive ADHD, history of alcohol dependency  Endocrine: no heat or cold intolerance      OBJECTIVE:     PHYSICAL EXAM:  Height: 5' 10" (177.8 cm) Weight: 114.5 kg (252 lb 8.6 oz), General Appearance: Well nourished, well developed, in no acute distress.  Neurological: Mood & affect are normal.  Shoulder exam: right  Tenderness: biceps tendon, lateral acromial  ROM: forward " flexion 180/180, extension 45/45, full abduction 180/180, abduction-glenohumeral 90/90, external rotation 50/50, pain at the extremes of mobility  Shoulder Strength: biceps 5/5, triceps 5/5, abduction 5/5, adduction 5/5, external rotation 5/5 with shoulder at side, flexion 5/5, and extension 5/5  positive for tenderness about the glenohumeral joint, negative for tenderness over the acromioclavicular joint and positive for impingement sign  Stability tests: anterior apprehension test positive for pain only and posterior apprehension test negative  Special Tests:Cross-chest abduction: negative     Shoulder exam: left  Tenderness: none  ROM: forward flexion 180/180, extension 45/45, full abduction 180/180, abduction-glenohumeral 90/90, external rotation 50/50  Shoulder Strength: biceps 5/5, triceps 5/5, abduction 5/5, adduction 5/5, external rotation 5/5 with shoulder at side, flexion 5/5, and extension 5/5  negative for tenderness about the glenohumeral joint, negative for tenderness over the acromioclavicular joint and negative for impingement sign  Stability tests: anterior apprehension test negative and posterior apprehension test negative  Special Tests:Cross-chest abduction: negative                  RADIOGRAPHS:  X-rays taken today films reviewed by me demonstrate no evidence of fracture dislocation or about the shoulder joint spaces well maintained mild joint space narrowing of the glenohumeral region bilaterally    ASSESSMENT/PLAN:       ICD-10-CM ICD-9-CM   1. Right shoulder pain, unspecified chronicity  M25.511 719.41   2. Shoulder impingement syndrome, right  M75.41 726.2   3. Bilateral shoulder pain, unspecified chronicity  M25.511 719.41    M25.512        Plan: We discussed with the patient at length all the different treatment options available for his rightshoulder including anti-inflammatories, acetaminophen, rest, ice, Physical therapy to include strengthening exercise, occasional cortisone injections  for temporary relief, arthroscopic surgical repair, and finally shoulder arthroplasty.   Will proceed with therapeutic diagnostic cortisone injection of the right shoulder\    The injection site was identified and the skin was prepared with an ETOH solution. The    right  shoulder was injected with 1 ml of Celestone and 5 ml Lidocaine under sterile technique. Nick Mcdonough Sr. tolerated the procedure well, he was advised to rest the  shoulder  today, ice and support. he did receive immediate relief of the pain in and about his  shoulder  he was told this would be short lived and is secondary to the lidocaine. he may have an increase in his discomfort tonight followed by steady improvement over the next several days. It may take 1-3 weeks following the injection to get the full benefit of the medication.  I will see him back in 3-6 months. Sooner if he has any problems or concerns.

## 2020-08-03 DIAGNOSIS — I10 ESSENTIAL HYPERTENSION: ICD-10-CM

## 2020-08-03 DIAGNOSIS — K21.9 GASTROESOPHAGEAL REFLUX DISEASE, ESOPHAGITIS PRESENCE NOT SPECIFIED: ICD-10-CM

## 2020-08-04 RX ORDER — TRIAMTERENE/HYDROCHLOROTHIAZID 37.5-25 MG
1 TABLET ORAL DAILY
Qty: 90 TABLET | Refills: 3 | Status: SHIPPED | OUTPATIENT
Start: 2020-08-04 | End: 2021-06-21 | Stop reason: SDUPTHER

## 2020-08-04 RX ORDER — ESOMEPRAZOLE MAGNESIUM 40 MG/1
40 CAPSULE, DELAYED RELEASE ORAL DAILY
Qty: 90 CAPSULE | Refills: 3 | Status: SHIPPED | OUTPATIENT
Start: 2020-08-04 | End: 2021-08-05 | Stop reason: SDUPTHER

## 2020-09-17 ENCOUNTER — OFFICE VISIT (OUTPATIENT)
Dept: FAMILY MEDICINE | Facility: CLINIC | Age: 55
End: 2020-09-17
Payer: COMMERCIAL

## 2020-09-17 VITALS
OXYGEN SATURATION: 97 % | TEMPERATURE: 98 F | DIASTOLIC BLOOD PRESSURE: 86 MMHG | BODY MASS INDEX: 36.29 KG/M2 | SYSTOLIC BLOOD PRESSURE: 138 MMHG | WEIGHT: 253.5 LBS | HEIGHT: 70 IN | HEART RATE: 62 BPM

## 2020-09-17 DIAGNOSIS — I10 ESSENTIAL HYPERTENSION: Primary | ICD-10-CM

## 2020-09-17 DIAGNOSIS — F90.0 ADHD (ATTENTION DEFICIT HYPERACTIVITY DISORDER), INATTENTIVE TYPE: ICD-10-CM

## 2020-09-17 DIAGNOSIS — E78.5 HYPERLIPIDEMIA, UNSPECIFIED HYPERLIPIDEMIA TYPE: ICD-10-CM

## 2020-09-17 PROCEDURE — 99213 PR OFFICE/OUTPT VISIT, EST, LEVL III, 20-29 MIN: ICD-10-PCS | Mod: S$GLB,,, | Performed by: NURSE PRACTITIONER

## 2020-09-17 PROCEDURE — 99999 PR PBB SHADOW E&M-EST. PATIENT-LVL IV: CPT | Mod: PBBFAC,,, | Performed by: NURSE PRACTITIONER

## 2020-09-17 PROCEDURE — 3079F DIAST BP 80-89 MM HG: CPT | Mod: CPTII,S$GLB,, | Performed by: NURSE PRACTITIONER

## 2020-09-17 PROCEDURE — 3075F SYST BP GE 130 - 139MM HG: CPT | Mod: CPTII,S$GLB,, | Performed by: NURSE PRACTITIONER

## 2020-09-17 PROCEDURE — 3075F PR MOST RECENT SYSTOLIC BLOOD PRESS GE 130-139MM HG: ICD-10-PCS | Mod: CPTII,S$GLB,, | Performed by: NURSE PRACTITIONER

## 2020-09-17 PROCEDURE — 3008F PR BODY MASS INDEX (BMI) DOCUMENTED: ICD-10-PCS | Mod: CPTII,S$GLB,, | Performed by: NURSE PRACTITIONER

## 2020-09-17 PROCEDURE — 99999 PR PBB SHADOW E&M-EST. PATIENT-LVL IV: ICD-10-PCS | Mod: PBBFAC,,, | Performed by: NURSE PRACTITIONER

## 2020-09-17 PROCEDURE — 3008F BODY MASS INDEX DOCD: CPT | Mod: CPTII,S$GLB,, | Performed by: NURSE PRACTITIONER

## 2020-09-17 PROCEDURE — 99213 OFFICE O/P EST LOW 20 MIN: CPT | Mod: S$GLB,,, | Performed by: NURSE PRACTITIONER

## 2020-09-17 PROCEDURE — 3079F PR MOST RECENT DIASTOLIC BLOOD PRESSURE 80-89 MM HG: ICD-10-PCS | Mod: CPTII,S$GLB,, | Performed by: NURSE PRACTITIONER

## 2020-09-17 RX ORDER — DEXTROAMPHETAMINE SULFATE, DEXTROAMPHETAMINE SACCHARATE, AMPHETAMINE SULFATE AND AMPHETAMINE ASPARTATE 7.5; 7.5; 7.5; 7.5 MG/1; MG/1; MG/1; MG/1
60 CAPSULE, EXTENDED RELEASE ORAL EVERY MORNING
Qty: 60 CAPSULE | Refills: 0 | Status: SHIPPED | OUTPATIENT
Start: 2020-09-17 | End: 2020-10-13 | Stop reason: SDUPTHER

## 2020-09-17 NOTE — PROGRESS NOTES
"Subjective:       Patient ID: Nick Mcdonough Sr. is a 55 y.o. male.    Chief Complaint: Follow-up (3 month follow up )    Patient is a 55-year-old white male with hypertension, GERD, ADHD, tobacco user, fluid retention to lower extremities with venous stasis dermatitis, obesity and Hyperlipidemia that is here today for 3 month follow up.  Patient did NOT have fasting labs before visit.    Patient has hypertension that is currently taking Lisinopril 40 mg daily, Maxzide 25 mg daily, Doxazosin 8 mg daily and Amlodipine 10 mg daily. Blood pressure remains borderline high. Advised on lifestyle modifications and decreased salt and will recheck in 1 month.  IF BP still high - will need to adjust medications or cut back on ADHD medication.  /86   Pulse 62   Temp 98.4 °F (36.9 °C) (Oral)   Ht 5' 10" (1.778 m)   Wt 115 kg (253 lb 8.5 oz)   SpO2 97%   BMI 36.38 kg/m²     Patient has ADHD that is stable on current regimen Adderall XR 30 mg 2 capsules daily.  Patient is able to focus and complete tasks effectively.  No side effects reported.    Patient has Hyperlipidemia and currently taking Atorvastatin 10 mg daily in place of Pravastatin 20 mg due to elevated triglycerides and intolerance to Rosuvastatin due to acute itching.  Patient was supposed to have fasting labs prior to this visit to evaluate results but was noncompliant.  Will reschedule labs and follow up in 1 month.        Current Outpatient Medications   Medication Sig Dispense Refill    ADDERALL XR 30 mg 24 hr capsule Take 2 capsules (60 mg total) by mouth every morning. BRAND ONLY 60 capsule 0    amLODIPine (NORVASC) 10 MG tablet Take 1 tablet (10 mg total) by mouth once daily. 90 tablet 0    atorvastatin (LIPITOR) 10 MG tablet Take 1 tablet (10 mg total) by mouth once daily. 90 tablet 0    doxazosin (CARDURA) 8 MG Tab Take 1 tablet (8 mg total) by mouth once daily. 90 tablet 0    esomeprazole (NEXIUM) 40 MG capsule Take 1 capsule (40 mg " total) by mouth once daily. 90 capsule 3    fish oil-omega-3 fatty acids 300-1,000 mg capsule Take 1 g by mouth once daily.      lisinopriL (PRINIVIL,ZESTRIL) 40 MG tablet Take 1 tablet (40 mg total) by mouth once daily. 90 tablet 0    niacin 500 MG Tab Take 1 tablet (500 mg total) by mouth every evening. (Patient taking differently: Take 1,000 mg by mouth every evening. ) 90 tablet 1    triamterene-hydrochlorothiazide 37.5-25 mg (MAXZIDE-25) 37.5-25 mg per tablet Take 1 tablet by mouth once daily. 90 tablet 3     No current facility-administered medications for this visit.        Past Medical History:   Diagnosis Date    Adult ADHD     Alcohol dependency 2015    last alcohol intake 2015    Colon polyp 2018    2 polyps - tubular adenoma - repeat colonoscopy in 5 years    GERD (gastroesophageal reflux disease)     Hyperlipidemia     Hypertension        Past Surgical History:   Procedure Laterality Date    COLONOSCOPY N/A 2018    Procedure: COLONOSCOPY;  Surgeon: Eduard Medley MD;  Location: [facility];  Service: Endoscopy;  Laterality: N/A;    HERNIA REPAIR      INGUINAL HERNIA REPAIR Left     [facility]    KNEE SURGERY Right     VASECTOMY         Family History   Problem Relation Age of Onset    Hypertension Father     Heart disease Brother         acute MI -  at at 46    Cancer Maternal Grandmother     No Known Problems Maternal Grandfather     Heart disease Paternal Grandmother     Alcohol abuse Paternal Grandfather        Social History     Socioeconomic History    Marital status:      Spouse name: Not on file    Number of children: Not on file    Years of education: Not on file    Highest education level: Not on file   Occupational History    Not on file   Social Needs    Financial resource strain: Not very hard    Food insecurity     Worry: Never true     Inability: Never true    Transportation needs     Medical: No     Non-medical: No    Tobacco Use    Smoking status: Current Every Day Smoker     Packs/day: 1.00     Years: 30.00     Pack years: 30.00    Smokeless tobacco: Never Used   Substance and Sexual Activity    Alcohol use: No     Frequency: 2-3 times a week     Drinks per session: 3 or 4     Binge frequency: Monthly     Comment: every weekend - a couple beers per weekend night    Drug use: No    Sexual activity: Yes     Partners: Female   Lifestyle    Physical activity     Days per week: 2 days     Minutes per session: 30 min    Stress: To some extent   Relationships    Social connections     Talks on phone: Three times a week     Gets together: Once a week     Attends Anab[employer]ist service: Not on file     Active member of club or organization: No     Attends meetings of clubs or organizations: Never     Relationship status:    Other Topics Concern    Not on file   Social History Narrative    Not on file       Review of Systems   Constitutional: Negative for activity change, appetite change, fatigue, fever and unexpected weight change.   HENT: Negative for congestion, ear pain, mouth sores, nosebleeds, postnasal drip, rhinorrhea, sinus pressure, sneezing, sore throat, trouble swallowing and voice change.    Eyes: Negative.    Respiratory: Negative for cough, chest tightness and shortness of breath.    Cardiovascular: Negative for chest pain, palpitations and leg swelling.   Gastrointestinal: Negative.  Negative for abdominal pain, blood in stool, constipation, diarrhea, nausea and vomiting.   Endocrine: Negative.    Genitourinary: Negative for difficulty urinating, dysuria, flank pain, hematuria and urgency.   Musculoskeletal: Negative for arthralgias, back pain, gait problem, joint swelling, myalgias and neck pain.   Skin: Negative for color change, rash and wound.   Allergic/Immunologic: Negative for immunocompromised state.   Neurological: Negative for dizziness, tremors, seizures, syncope, speech difficulty and headaches.  "  Hematological: Negative for adenopathy. Does not bruise/bleed easily.   Psychiatric/Behavioral: Negative for behavioral problems, dysphoric mood, sleep disturbance and suicidal ideas. The patient is not nervous/anxious.          Objective:     Vitals:    09/17/20 1657 09/17/20 1720   BP: (!) 142/82 138/86   Pulse: 62    Temp: 98.4 °F (36.9 °C)    TempSrc: Oral    SpO2: 97%    Weight: 115 kg (253 lb 8.5 oz)    Height: 5' 10" (1.778 m)           Physical Exam  Constitutional:       General: He is not in acute distress.     Appearance: He is well-developed. He is not diaphoretic.      Comments: + obesity with Body mass index  36.38 kg/m².     HENT:      Head: Normocephalic.      Right Ear: External ear normal.      Left Ear: External ear normal.      Nose: Nose normal.      Mouth/Throat:      Pharynx: No oropharyngeal exudate.   Eyes:      General: No scleral icterus.        Right eye: No discharge.         Left eye: No discharge.      Pupils: Pupils are equal, round, and reactive to light.   Neck:      Musculoskeletal: Normal range of motion and neck supple.      Thyroid: No thyromegaly.      Vascular: No JVD.      Trachea: No tracheal deviation.   Cardiovascular:      Rate and Rhythm: Normal rate and regular rhythm.      Heart sounds: Normal heart sounds. No murmur.   Pulmonary:      Effort: Pulmonary effort is normal. No respiratory distress.      Breath sounds: Normal breath sounds. No stridor.   Abdominal:      General: There is no distension.      Palpations: Abdomen is soft. There is no mass.      Tenderness: There is no guarding.   Musculoskeletal: Normal range of motion.   Lymphadenopathy:      Cervical: No cervical adenopathy.   Skin:     General: Skin is warm and dry.      Findings: No rash.   Neurological:      Mental Status: He is alert and oriented to person, place, and time.      Coordination: Coordination normal.   Psychiatric:         Behavior: Behavior normal.           Assessment:         ICD-10-CM " ICD-9-CM   1. Essential hypertension  I10 401.9   2. ADHD (attention deficit hyperactivity disorder), inattentive type  F90.0 314.00   3. Hyperlipidemia, unspecified hyperlipidemia type  E78.5 272.4       Plan:       Essential hypertension  -  Continue all medications.  STRICTER lifestyle modifications, decreased salt.  Low fat - work on weight loss.  Recheck in 1 month.    ADHD (attention deficit hyperactivity disorder), inattentive type  -  Will continue present dose for now but if blood pressure does not improve - will cut back on dose.  -     ADDERALL XR 30 mg 24 hr capsule; Take 2 capsules (60 mg total) by mouth every morning. BRAND ONLY  Dispense: 60 capsule; Refill: 0    Hyperlipidemia, unspecified hyperlipidemia type  -  Continue Atorvastatin at present dose and make sure to have fasting labs BEFORE NEXT VISIT - send me refill request if needed.      Follow up in about 1 month (around 10/17/2020) for fasting labs and follow up.     Patient's Medications   New Prescriptions    No medications on file   Previous Medications    AMLODIPINE (NORVASC) 10 MG TABLET    Take 1 tablet (10 mg total) by mouth once daily.    ATORVASTATIN (LIPITOR) 10 MG TABLET    Take 1 tablet (10 mg total) by mouth once daily.    DOXAZOSIN (CARDURA) 8 MG TAB    Take 1 tablet (8 mg total) by mouth once daily.    ESOMEPRAZOLE (NEXIUM) 40 MG CAPSULE    Take 1 capsule (40 mg total) by mouth once daily.    FISH OIL-OMEGA-3 FATTY ACIDS 300-1,000 MG CAPSULE    Take 1 g by mouth once daily.    LISINOPRIL (PRINIVIL,ZESTRIL) 40 MG TABLET    Take 1 tablet (40 mg total) by mouth once daily.    NIACIN 500 MG TAB    Take 1 tablet (500 mg total) by mouth every evening.    TRIAMTERENE-HYDROCHLOROTHIAZIDE 37.5-25 MG (MAXZIDE-25) 37.5-25 MG PER TABLET    Take 1 tablet by mouth once daily.   Modified Medications    Modified Medication Previous Medication    ADDERALL XR 30 MG 24 HR CAPSULE ADDERALL XR 30 mg 24 hr capsule       Take 2 capsules (60 mg total)  by mouth every morning. BRAND ONLY    Take 2 capsules (60 mg total) by mouth every morning. BRAND ONLY   Discontinued Medications    No medications on file

## 2020-09-22 DIAGNOSIS — E78.5 HYPERLIPIDEMIA, UNSPECIFIED HYPERLIPIDEMIA TYPE: ICD-10-CM

## 2020-09-22 RX ORDER — ATORVASTATIN CALCIUM 10 MG/1
10 TABLET, FILM COATED ORAL DAILY
Qty: 90 TABLET | Refills: 0 | Status: SHIPPED | OUTPATIENT
Start: 2020-09-22 | End: 2020-12-29 | Stop reason: SDUPTHER

## 2020-09-22 RX ORDER — LISINOPRIL 40 MG/1
40 TABLET ORAL DAILY
Qty: 90 TABLET | Refills: 0 | Status: SHIPPED | OUTPATIENT
Start: 2020-09-22 | End: 2020-12-29 | Stop reason: SDUPTHER

## 2020-09-22 RX ORDER — AMLODIPINE BESYLATE 10 MG/1
10 TABLET ORAL DAILY
Qty: 90 TABLET | Refills: 0 | Status: SHIPPED | OUTPATIENT
Start: 2020-09-22 | End: 2020-12-29 | Stop reason: SDUPTHER

## 2020-10-03 ENCOUNTER — PATIENT MESSAGE (OUTPATIENT)
Dept: FAMILY MEDICINE | Facility: CLINIC | Age: 55
End: 2020-10-03

## 2020-10-13 ENCOUNTER — OFFICE VISIT (OUTPATIENT)
Dept: FAMILY MEDICINE | Facility: CLINIC | Age: 55
End: 2020-10-13
Payer: COMMERCIAL

## 2020-10-13 VITALS
DIASTOLIC BLOOD PRESSURE: 78 MMHG | HEART RATE: 76 BPM | BODY MASS INDEX: 35.77 KG/M2 | RESPIRATION RATE: 16 BRPM | TEMPERATURE: 98 F | WEIGHT: 249.88 LBS | OXYGEN SATURATION: 96 % | HEIGHT: 70 IN | SYSTOLIC BLOOD PRESSURE: 132 MMHG

## 2020-10-13 DIAGNOSIS — E78.5 HYPERLIPIDEMIA, UNSPECIFIED HYPERLIPIDEMIA TYPE: ICD-10-CM

## 2020-10-13 DIAGNOSIS — Z23 FLU VACCINE NEED: ICD-10-CM

## 2020-10-13 DIAGNOSIS — F90.0 ADHD (ATTENTION DEFICIT HYPERACTIVITY DISORDER), INATTENTIVE TYPE: ICD-10-CM

## 2020-10-13 DIAGNOSIS — I10 ESSENTIAL HYPERTENSION: Primary | ICD-10-CM

## 2020-10-13 PROCEDURE — 3008F BODY MASS INDEX DOCD: CPT | Mod: CPTII,S$GLB,, | Performed by: NURSE PRACTITIONER

## 2020-10-13 PROCEDURE — 3078F PR MOST RECENT DIASTOLIC BLOOD PRESSURE < 80 MM HG: ICD-10-PCS | Mod: CPTII,S$GLB,, | Performed by: NURSE PRACTITIONER

## 2020-10-13 PROCEDURE — 90686 IIV4 VACC NO PRSV 0.5 ML IM: CPT | Mod: S$GLB,,, | Performed by: NURSE PRACTITIONER

## 2020-10-13 PROCEDURE — 3075F SYST BP GE 130 - 139MM HG: CPT | Mod: CPTII,S$GLB,, | Performed by: NURSE PRACTITIONER

## 2020-10-13 PROCEDURE — 99999 PR PBB SHADOW E&M-EST. PATIENT-LVL IV: CPT | Mod: PBBFAC,,, | Performed by: NURSE PRACTITIONER

## 2020-10-13 PROCEDURE — 99214 OFFICE O/P EST MOD 30 MIN: CPT | Mod: 25,S$GLB,, | Performed by: NURSE PRACTITIONER

## 2020-10-13 PROCEDURE — 3078F DIAST BP <80 MM HG: CPT | Mod: CPTII,S$GLB,, | Performed by: NURSE PRACTITIONER

## 2020-10-13 PROCEDURE — 90471 IMMUNIZATION ADMIN: CPT | Mod: S$GLB,,, | Performed by: NURSE PRACTITIONER

## 2020-10-13 PROCEDURE — 90686 FLU VACCINE (QUAD) GREATER THAN OR EQUAL TO 3YO PRESERVATIVE FREE IM: ICD-10-PCS | Mod: S$GLB,,, | Performed by: NURSE PRACTITIONER

## 2020-10-13 PROCEDURE — 99214 PR OFFICE/OUTPT VISIT, EST, LEVL IV, 30-39 MIN: ICD-10-PCS | Mod: 25,S$GLB,, | Performed by: NURSE PRACTITIONER

## 2020-10-13 PROCEDURE — 99999 PR PBB SHADOW E&M-EST. PATIENT-LVL IV: ICD-10-PCS | Mod: PBBFAC,,, | Performed by: NURSE PRACTITIONER

## 2020-10-13 PROCEDURE — 3008F PR BODY MASS INDEX (BMI) DOCUMENTED: ICD-10-PCS | Mod: CPTII,S$GLB,, | Performed by: NURSE PRACTITIONER

## 2020-10-13 PROCEDURE — 3075F PR MOST RECENT SYSTOLIC BLOOD PRESS GE 130-139MM HG: ICD-10-PCS | Mod: CPTII,S$GLB,, | Performed by: NURSE PRACTITIONER

## 2020-10-13 PROCEDURE — 90471 FLU VACCINE (QUAD) GREATER THAN OR EQUAL TO 3YO PRESERVATIVE FREE IM: ICD-10-PCS | Mod: S$GLB,,, | Performed by: NURSE PRACTITIONER

## 2020-10-13 RX ORDER — DOXAZOSIN 8 MG/1
8 TABLET ORAL DAILY
Qty: 90 TABLET | Refills: 1 | Status: SHIPPED | OUTPATIENT
Start: 2020-10-13 | End: 2021-06-21 | Stop reason: SDUPTHER

## 2020-10-13 RX ORDER — DEXTROAMPHETAMINE SULFATE, DEXTROAMPHETAMINE SACCHARATE, AMPHETAMINE SULFATE AND AMPHETAMINE ASPARTATE 7.5; 7.5; 7.5; 7.5 MG/1; MG/1; MG/1; MG/1
60 CAPSULE, EXTENDED RELEASE ORAL EVERY MORNING
Qty: 180 CAPSULE | Refills: 0 | Status: SHIPPED | OUTPATIENT
Start: 2020-10-13 | End: 2021-01-06 | Stop reason: SDUPTHER

## 2020-10-13 NOTE — PROGRESS NOTES
"Subjective:       Patient ID: Nick Mcdonough Sr. is a 55 y.o. male.    Chief Complaint: Medication Refill    Patient is a 55-year-old white male with hypertension, GERD, ADHD, tobacco user, fluid retention to lower extremities with venous stasis dermatitis, obesity and Hyperlipidemia that is here today for 1 month follow up.      Patient has hypertension that is currently taking Lisinopril 40 mg daily, Maxzide 25 mg daily, Doxazosin 8 mg daily and Amlodipine 10 mg daily. Advised on lifestyle modifications and decreased salt and now her for blood pressure check.  Blood pressure is improve and we will continue to monitor:   /78 (BP Location: Right arm, Patient Position: Sitting, BP Method: Large (Manual))   Pulse 76   Temp 98.4 °F (36.9 °C) (Temporal)   Resp 16   Ht 5' 10" (1.778 m)   Wt 113.3 kg (249 lb 14.3 oz)   SpO2 96%   BMI 35.86 kg/m²      Patient has ADHD that is stable on current regimen Adderall XR 30 mg 2 capsules daily.  Patient is able to focus and complete tasks effectively.  No side effects reported.     Patient has Hyperlipidemia and currently taking Atorvastatin 10 mg daily in place of Pravastatin 20 mg due to elevated triglycerides and intolerance to Rosuvastatin due to acute itching.  Cholesterol levels are much improved on the Atorvastatin - see results below.    Component      Latest Ref Rng & Units 10/3/2020 6/20/2020 9/28/2019 3/9/2019   Sodium      136 - 145 mmol/L 144 143 139 141   Potassium      3.5 - 5.1 mmol/L 3.8 3.7 3.5 3.7   Chloride      95 - 110 mmol/L 107 109 104 103   CO2      23 - 29 mmol/L 29 26 27 26   Glucose      70 - 110 mg/dL 104 91 107 107   BUN, Bld      2 - 20 mg/dL 21 (H) 23 (H) 28 (H) 22 (H)   Creatinine      0.50 - 1.40 mg/dL 1.05 0.91 1.01 0.94   Calcium      8.7 - 10.5 mg/dL 9.5 9.2 9.3 9.2   PROTEIN TOTAL      6.0 - 8.4 g/dL 7.4 6.9 7.5 7.3   Albumin      3.5 - 5.2 g/dL 4.5 4.2 4.3 4.2   BILIRUBIN TOTAL      0.1 - 1.0 mg/dL 0.9 0.3 0.4 0.4   Alkaline " Phosphatase      38 - 126 U/L 86 99 106 96   AST      15 - 46 U/L 33 38 40 35   ALT      10 - 44 U/L 35 33 40 43   Anion Gap      8 - 16 mmol/L 8 8 8 12   eGFR if African American      >60 mL/min/1.73 m:2 >60.0 >60.0 >60.0 >60.0   eGFR if non African American      >60 mL/min/1.73 m:2 >60.0 >60.0 >60.0 >60.0   Cholesterol      120 - 199 mg/dL 115 (L) 160 155 137   Triglycerides      30 - 150 mg/dL 86 243 (H) 194 (H) 243 (H)   HDL      40 - 75 mg/dL 32 (L) 29 (L) 36 (L) 29 (L)   LDL Cholesterol External      63.0 - 159.0 mg/dL 65.8 82.4 80.2 59.4 (L)   Hdl/Cholesterol Ratio      20.0 - 50.0 % 27.8 18.1 (L) 23.2 21.2   Total Cholesterol/HDL Ratio      2.0 - 5.0 3.6 5.5 (H) 4.3 4.7   Non-HDL Cholesterol      mg/dL 83 131 119 108     Current Outpatient Medications   Medication Sig Dispense Refill    ADDERALL XR 30 mg 24 hr capsule Take 2 capsules (60 mg total) by mouth every morning. BRAND ONLY 60 capsule 0    amLODIPine (NORVASC) 10 MG tablet Take 1 tablet (10 mg total) by mouth once daily. 90 tablet 0    atorvastatin (LIPITOR) 10 MG tablet Take 1 tablet (10 mg total) by mouth once daily. 90 tablet 0    doxazosin (CARDURA) 8 MG Tab Take 1 tablet (8 mg total) by mouth once daily. 90 tablet 0    esomeprazole (NEXIUM) 40 MG capsule Take 1 capsule (40 mg total) by mouth once daily. 90 capsule 3    fish oil-omega-3 fatty acids 300-1,000 mg capsule Take 1 g by mouth once daily.      lisinopriL (PRINIVIL,ZESTRIL) 40 MG tablet Take 1 tablet (40 mg total) by mouth once daily. 90 tablet 0    niacin 500 MG Tab Take 1 tablet (500 mg total) by mouth every evening. (Patient taking differently: Take 1,000 mg by mouth every evening. ) 90 tablet 1    triamterene-hydrochlorothiazide 37.5-25 mg (MAXZIDE-25) 37.5-25 mg per tablet Take 1 tablet by mouth once daily. 90 tablet 3     No current facility-administered medications for this visit.        Past Medical History:   Diagnosis Date    Adult ADHD     Alcohol dependency 9/2015     last alcohol intake 2015    Colon polyp 2018    2 polyps - tubular adenoma - repeat colonoscopy in 5 years    GERD (gastroesophageal reflux disease)     Hyperlipidemia     Hypertension        Past Surgical History:   Procedure Laterality Date    COLONOSCOPY N/A 2018    Procedure: COLONOSCOPY;  Surgeon: Eduard Medley MD;  [facility];  Service: Endoscopy;  Laterality: N/A;    HERNIA REPAIR      INGUINAL HERNIA REPAIR Left 2012    [facility]    KNEE SURGERY Right     VASECTOMY         Family History   Problem Relation Age of Onset    Hypertension Father     Heart disease Brother         acute MI -  at at 46    Cancer Maternal Grandmother     No Known Problems Maternal Grandfather     Heart disease Paternal Grandmother     Alcohol abuse Paternal Grandfather        Social History     Socioeconomic History    Marital status:      Spouse name: Not on file    Number of children: Not on file    Years of education: Not on file    Highest education level: Not on file   Occupational History    Not on file   Social Needs    Financial resource strain: Not very hard    Food insecurity     Worry: Never true     Inability: Never true    Transportation needs     Medical: No     Non-medical: No   Tobacco Use    Smoking status: Current Every Day Smoker     Packs/day: 1.00     Years: 30.00     Pack years: 30.00    Smokeless tobacco: Never Used   Substance and Sexual Activity    Alcohol use: No     Frequency: 2-3 times a week     Drinks per session: 3 or 4     Binge frequency: Monthly     Comment: every weekend - a couple beers per weekend night    Drug use: No    Sexual activity: Yes     Partners: Female   Lifestyle    Physical activity     Days per week: 2 days     Minutes per session: 30 min    Stress: To some extent   Relationships    Social connections     Talks on phone: Three times a week     Gets together: Once a week     Attends Evangelical service:  "Not on file     Active member of club or organization: No     Attends meetings of clubs or organizations: Never     Relationship status:    Other Topics Concern    Not on file   Social History Narrative    Not on file       Review of Systems   Constitutional: Negative for activity change, appetite change, fatigue, fever and unexpected weight change.   HENT: Negative for congestion, ear pain, mouth sores, nosebleeds, postnasal drip, rhinorrhea, sinus pressure, sneezing, sore throat, trouble swallowing and voice change.    Eyes: Negative.    Respiratory: Negative for cough, chest tightness and shortness of breath.    Cardiovascular: Negative for chest pain, palpitations and leg swelling.   Gastrointestinal: Negative.  Negative for abdominal pain, blood in stool, constipation, diarrhea, nausea and vomiting.   Endocrine: Negative.    Genitourinary: Negative for difficulty urinating, dysuria, flank pain, hematuria and urgency.   Musculoskeletal: Negative for arthralgias, back pain, gait problem, joint swelling, myalgias and neck pain.   Skin: Negative for color change, rash and wound.   Allergic/Immunologic: Negative for immunocompromised state.   Neurological: Negative for dizziness, tremors, seizures, syncope, speech difficulty and headaches.   Hematological: Negative for adenopathy. Does not bruise/bleed easily.   Psychiatric/Behavioral: Negative for behavioral problems, dysphoric mood, sleep disturbance and suicidal ideas. The patient is not nervous/anxious.          Objective:     Vitals:    10/13/20 1656   BP: 132/78   BP Location: Right arm   Patient Position: Sitting   BP Method: Large (Manual)   Pulse: 76   Resp: 16   Temp: 98.4 °F (36.9 °C)   TempSrc: Temporal   SpO2: 96%   Weight: 113.3 kg (249 lb 14.3 oz)   Height: 5' 10" (1.778 m)          Physical Exam  Constitutional:       General: He is not in acute distress.     Appearance: He is well-developed. He is obese. He is not ill-appearing, " toxic-appearing or diaphoretic.      Comments: + obesity with Body mass index is 35.86 kg/m².   HENT:      Head: Normocephalic and atraumatic.      Right Ear: External ear normal.      Left Ear: External ear normal.      Nose: Nose normal.   Eyes:      General: No scleral icterus.        Right eye: No discharge.         Left eye: No discharge.      Extraocular Movements: Extraocular movements intact.      Conjunctiva/sclera: Conjunctivae normal.      Pupils: Pupils are equal, round, and reactive to light.   Neck:      Musculoskeletal: Normal range of motion and neck supple.      Thyroid: No thyromegaly.      Vascular: No JVD.      Trachea: No tracheal deviation.   Cardiovascular:      Rate and Rhythm: Normal rate and regular rhythm.      Heart sounds: Normal heart sounds. No murmur.   Pulmonary:      Effort: Pulmonary effort is normal. No respiratory distress.      Breath sounds: Normal breath sounds. No stridor. No wheezing or rhonchi.   Chest:      Chest wall: No tenderness.   Abdominal:      General: There is no distension.      Palpations: Abdomen is soft. There is no mass.      Tenderness: There is no guarding.   Musculoskeletal: Normal range of motion.   Lymphadenopathy:      Cervical: No cervical adenopathy.   Skin:     General: Skin is warm and dry.      Findings: No rash.   Neurological:      Mental Status: He is alert and oriented to person, place, and time.      Coordination: Coordination normal.   Psychiatric:         Mood and Affect: Mood normal.         Behavior: Behavior normal.         Thought Content: Thought content normal.         Judgment: Judgment normal.           Assessment:         ICD-10-CM ICD-9-CM   1. Essential hypertension  I10 401.9   2. Hyperlipidemia, unspecified hyperlipidemia type  E78.5 272.4   3. ADHD (attention deficit hyperactivity disorder), inattentive type  F90.0 314.00   4. Flu vaccine need  Z23 V04.81       Plan:       Essential hypertension  -  Continue all medications at  present dose and recheck in 3 months.  -     doxazosin (CARDURA) 8 MG Tab; Take 1 tablet (8 mg total) by mouth once daily.  Dispense: 90 tablet; Refill: 1    Hyperlipidemia, unspecified hyperlipidemia type  -  continue atorvastatin at present dose and will recheck levels when due for wellness exam    ADHD (attention deficit hyperactivity disorder), inattentive type  -  controlled on current medicine and dose.  Recheck in 3 months  -     ADDERALL XR 30 mg 24 hr capsule; Take 2 capsules (60 mg total) by mouth every morning. BRAND ONLY  Dispense: 180 capsule; Refill: 0    Flu vaccine need  -  flu vaccine today  -     Influenza - Quadrivalent *Preferred* (6 months+) (PF)      Follow up in about 3 months (around 1/11/2021) for ADHD check.     Patient's Medications   New Prescriptions    No medications on file   Previous Medications    AMLODIPINE (NORVASC) 10 MG TABLET    Take 1 tablet (10 mg total) by mouth once daily.    ATORVASTATIN (LIPITOR) 10 MG TABLET    Take 1 tablet (10 mg total) by mouth once daily.    ESOMEPRAZOLE (NEXIUM) 40 MG CAPSULE    Take 1 capsule (40 mg total) by mouth once daily.    FISH OIL-OMEGA-3 FATTY ACIDS 300-1,000 MG CAPSULE    Take 1 g by mouth once daily.    LISINOPRIL (PRINIVIL,ZESTRIL) 40 MG TABLET    Take 1 tablet (40 mg total) by mouth once daily.    NIACIN 500 MG TAB    Take 1 tablet (500 mg total) by mouth every evening.    TRIAMTERENE-HYDROCHLOROTHIAZIDE 37.5-25 MG (MAXZIDE-25) 37.5-25 MG PER TABLET    Take 1 tablet by mouth once daily.   Modified Medications    Modified Medication Previous Medication    ADDERALL XR 30 MG 24 HR CAPSULE ADDERALL XR 30 mg 24 hr capsule       Take 2 capsules (60 mg total) by mouth every morning. BRAND ONLY    Take 2 capsules (60 mg total) by mouth every morning. BRAND ONLY    DOXAZOSIN (CARDURA) 8 MG TAB doxazosin (CARDURA) 8 MG Tab       Take 1 tablet (8 mg total) by mouth once daily.    Take 1 tablet (8 mg total) by mouth once daily.   Discontinued  Medications    No medications on file

## 2020-12-29 DIAGNOSIS — E78.5 HYPERLIPIDEMIA, UNSPECIFIED HYPERLIPIDEMIA TYPE: ICD-10-CM

## 2020-12-29 RX ORDER — AMLODIPINE BESYLATE 10 MG/1
10 TABLET ORAL DAILY
Qty: 90 TABLET | Refills: 0 | Status: SHIPPED | OUTPATIENT
Start: 2020-12-29 | End: 2021-03-19 | Stop reason: SDUPTHER

## 2020-12-29 RX ORDER — LISINOPRIL 40 MG/1
40 TABLET ORAL DAILY
Qty: 90 TABLET | Refills: 0 | Status: SHIPPED | OUTPATIENT
Start: 2020-12-29 | End: 2021-03-19 | Stop reason: SDUPTHER

## 2020-12-29 RX ORDER — ATORVASTATIN CALCIUM 10 MG/1
10 TABLET, FILM COATED ORAL DAILY
Qty: 90 TABLET | Refills: 0 | Status: SHIPPED | OUTPATIENT
Start: 2020-12-29 | End: 2021-03-19 | Stop reason: SDUPTHER

## 2021-01-06 ENCOUNTER — OFFICE VISIT (OUTPATIENT)
Dept: FAMILY MEDICINE | Facility: CLINIC | Age: 56
End: 2021-01-06
Payer: COMMERCIAL

## 2021-01-06 VITALS
HEART RATE: 74 BPM | DIASTOLIC BLOOD PRESSURE: 66 MMHG | SYSTOLIC BLOOD PRESSURE: 120 MMHG | RESPIRATION RATE: 18 BRPM | OXYGEN SATURATION: 97 % | BODY MASS INDEX: 34.92 KG/M2 | WEIGHT: 243.94 LBS | HEIGHT: 70 IN | TEMPERATURE: 97 F

## 2021-01-06 DIAGNOSIS — F90.0 ADHD (ATTENTION DEFICIT HYPERACTIVITY DISORDER), INATTENTIVE TYPE: ICD-10-CM

## 2021-01-06 DIAGNOSIS — R60.0 FLUID RETENTION IN LEGS: ICD-10-CM

## 2021-01-06 DIAGNOSIS — I10 ESSENTIAL HYPERTENSION: Primary | ICD-10-CM

## 2021-01-06 DIAGNOSIS — E66.01 CLASS 2 SEVERE OBESITY WITH SERIOUS COMORBIDITY AND BODY MASS INDEX (BMI) OF 35.0 TO 35.9 IN ADULT, UNSPECIFIED OBESITY TYPE: ICD-10-CM

## 2021-01-06 PROCEDURE — 1126F AMNT PAIN NOTED NONE PRSNT: CPT | Mod: S$GLB,,, | Performed by: NURSE PRACTITIONER

## 2021-01-06 PROCEDURE — 3008F BODY MASS INDEX DOCD: CPT | Mod: CPTII,S$GLB,, | Performed by: NURSE PRACTITIONER

## 2021-01-06 PROCEDURE — 3008F PR BODY MASS INDEX (BMI) DOCUMENTED: ICD-10-PCS | Mod: CPTII,S$GLB,, | Performed by: NURSE PRACTITIONER

## 2021-01-06 PROCEDURE — 1126F PR PAIN SEVERITY QUANTIFIED, NO PAIN PRESENT: ICD-10-PCS | Mod: S$GLB,,, | Performed by: NURSE PRACTITIONER

## 2021-01-06 PROCEDURE — 99214 PR OFFICE/OUTPT VISIT, EST, LEVL IV, 30-39 MIN: ICD-10-PCS | Mod: S$GLB,,, | Performed by: NURSE PRACTITIONER

## 2021-01-06 PROCEDURE — 3078F PR MOST RECENT DIASTOLIC BLOOD PRESSURE < 80 MM HG: ICD-10-PCS | Mod: CPTII,S$GLB,, | Performed by: NURSE PRACTITIONER

## 2021-01-06 PROCEDURE — 3074F SYST BP LT 130 MM HG: CPT | Mod: CPTII,S$GLB,, | Performed by: NURSE PRACTITIONER

## 2021-01-06 PROCEDURE — 99999 PR PBB SHADOW E&M-EST. PATIENT-LVL IV: CPT | Mod: PBBFAC,,, | Performed by: NURSE PRACTITIONER

## 2021-01-06 PROCEDURE — 99214 OFFICE O/P EST MOD 30 MIN: CPT | Mod: S$GLB,,, | Performed by: NURSE PRACTITIONER

## 2021-01-06 PROCEDURE — 3078F DIAST BP <80 MM HG: CPT | Mod: CPTII,S$GLB,, | Performed by: NURSE PRACTITIONER

## 2021-01-06 PROCEDURE — 99999 PR PBB SHADOW E&M-EST. PATIENT-LVL IV: ICD-10-PCS | Mod: PBBFAC,,, | Performed by: NURSE PRACTITIONER

## 2021-01-06 PROCEDURE — 3074F PR MOST RECENT SYSTOLIC BLOOD PRESSURE < 130 MM HG: ICD-10-PCS | Mod: CPTII,S$GLB,, | Performed by: NURSE PRACTITIONER

## 2021-01-06 RX ORDER — DEXTROAMPHETAMINE SULFATE, DEXTROAMPHETAMINE SACCHARATE, AMPHETAMINE SULFATE AND AMPHETAMINE ASPARTATE 7.5; 7.5; 7.5; 7.5 MG/1; MG/1; MG/1; MG/1
60 CAPSULE, EXTENDED RELEASE ORAL EVERY MORNING
Qty: 180 CAPSULE | Refills: 0 | Status: SHIPPED | OUTPATIENT
Start: 2021-01-06 | End: 2021-03-31 | Stop reason: SDUPTHER

## 2021-03-19 ENCOUNTER — IMMUNIZATION (OUTPATIENT)
Dept: OBSTETRICS AND GYNECOLOGY | Facility: CLINIC | Age: 56
End: 2021-03-19
Payer: COMMERCIAL

## 2021-03-19 DIAGNOSIS — E78.5 HYPERLIPIDEMIA, UNSPECIFIED HYPERLIPIDEMIA TYPE: ICD-10-CM

## 2021-03-19 DIAGNOSIS — Z23 NEED FOR VACCINATION: Primary | ICD-10-CM

## 2021-03-19 PROCEDURE — 91300 COVID-19, MRNA, LNP-S, PF, 30 MCG/0.3 ML DOSE VACCINE: ICD-10-PCS | Mod: ,,, | Performed by: ANESTHESIOLOGY

## 2021-03-19 PROCEDURE — 91300 COVID-19, MRNA, LNP-S, PF, 30 MCG/0.3 ML DOSE VACCINE: CPT | Mod: ,,, | Performed by: ANESTHESIOLOGY

## 2021-03-19 PROCEDURE — 0001A COVID-19, MRNA, LNP-S, PF, 30 MCG/0.3 ML DOSE VACCINE: ICD-10-PCS | Mod: CV19,,, | Performed by: ANESTHESIOLOGY

## 2021-03-19 PROCEDURE — 0001A COVID-19, MRNA, LNP-S, PF, 30 MCG/0.3 ML DOSE VACCINE: CPT | Mod: CV19,,, | Performed by: ANESTHESIOLOGY

## 2021-03-19 RX ORDER — LISINOPRIL 40 MG/1
40 TABLET ORAL DAILY
Qty: 90 TABLET | Refills: 0 | Status: SHIPPED | OUTPATIENT
Start: 2021-03-19 | End: 2021-06-21 | Stop reason: SDUPTHER

## 2021-03-19 RX ORDER — ATORVASTATIN CALCIUM 10 MG/1
10 TABLET, FILM COATED ORAL DAILY
Qty: 90 TABLET | Refills: 0 | Status: SHIPPED | OUTPATIENT
Start: 2021-03-19 | End: 2021-06-21 | Stop reason: SDUPTHER

## 2021-03-19 RX ORDER — AMLODIPINE BESYLATE 10 MG/1
10 TABLET ORAL DAILY
Qty: 90 TABLET | Refills: 0 | Status: SHIPPED | OUTPATIENT
Start: 2021-03-19 | End: 2021-06-21 | Stop reason: SDUPTHER

## 2021-03-31 ENCOUNTER — OFFICE VISIT (OUTPATIENT)
Dept: FAMILY MEDICINE | Facility: CLINIC | Age: 56
End: 2021-03-31
Payer: COMMERCIAL

## 2021-03-31 VITALS
RESPIRATION RATE: 16 BRPM | WEIGHT: 250 LBS | TEMPERATURE: 99 F | OXYGEN SATURATION: 96 % | HEART RATE: 81 BPM | HEIGHT: 70 IN | SYSTOLIC BLOOD PRESSURE: 136 MMHG | DIASTOLIC BLOOD PRESSURE: 80 MMHG | BODY MASS INDEX: 35.79 KG/M2

## 2021-03-31 DIAGNOSIS — Z12.5 SCREENING PSA (PROSTATE SPECIFIC ANTIGEN): ICD-10-CM

## 2021-03-31 DIAGNOSIS — Z00.00 ENCOUNTER FOR BLOOD TEST FOR ROUTINE GENERAL PHYSICAL EXAMINATION: ICD-10-CM

## 2021-03-31 DIAGNOSIS — Z13.0 SCREENING FOR DEFICIENCY ANEMIA: ICD-10-CM

## 2021-03-31 DIAGNOSIS — F90.0 ADHD (ATTENTION DEFICIT HYPERACTIVITY DISORDER), INATTENTIVE TYPE: ICD-10-CM

## 2021-03-31 DIAGNOSIS — Z13.29 THYROID DISORDER SCREEN: ICD-10-CM

## 2021-03-31 DIAGNOSIS — Z13.1 DIABETES MELLITUS SCREENING: ICD-10-CM

## 2021-03-31 DIAGNOSIS — I10 ESSENTIAL HYPERTENSION: Primary | ICD-10-CM

## 2021-03-31 DIAGNOSIS — E78.49 OTHER HYPERLIPIDEMIA: ICD-10-CM

## 2021-03-31 DIAGNOSIS — Z72.0 TOBACCO USER: ICD-10-CM

## 2021-03-31 DIAGNOSIS — Z11.59 ENCOUNTER FOR HEPATITIS C SCREENING TEST FOR LOW RISK PATIENT: ICD-10-CM

## 2021-03-31 DIAGNOSIS — E66.01 CLASS 2 SEVERE OBESITY WITH SERIOUS COMORBIDITY AND BODY MASS INDEX (BMI) OF 35.0 TO 35.9 IN ADULT, UNSPECIFIED OBESITY TYPE: ICD-10-CM

## 2021-03-31 PROCEDURE — 3008F PR BODY MASS INDEX (BMI) DOCUMENTED: ICD-10-PCS | Mod: CPTII,S$GLB,, | Performed by: NURSE PRACTITIONER

## 2021-03-31 PROCEDURE — 99999 PR PBB SHADOW E&M-EST. PATIENT-LVL III: ICD-10-PCS | Mod: PBBFAC,,, | Performed by: NURSE PRACTITIONER

## 2021-03-31 PROCEDURE — 3075F PR MOST RECENT SYSTOLIC BLOOD PRESS GE 130-139MM HG: ICD-10-PCS | Mod: CPTII,S$GLB,, | Performed by: NURSE PRACTITIONER

## 2021-03-31 PROCEDURE — 3079F PR MOST RECENT DIASTOLIC BLOOD PRESSURE 80-89 MM HG: ICD-10-PCS | Mod: CPTII,S$GLB,, | Performed by: NURSE PRACTITIONER

## 2021-03-31 PROCEDURE — 3079F DIAST BP 80-89 MM HG: CPT | Mod: CPTII,S$GLB,, | Performed by: NURSE PRACTITIONER

## 2021-03-31 PROCEDURE — 3075F SYST BP GE 130 - 139MM HG: CPT | Mod: CPTII,S$GLB,, | Performed by: NURSE PRACTITIONER

## 2021-03-31 PROCEDURE — 3008F BODY MASS INDEX DOCD: CPT | Mod: CPTII,S$GLB,, | Performed by: NURSE PRACTITIONER

## 2021-03-31 PROCEDURE — 99214 PR OFFICE/OUTPT VISIT, EST, LEVL IV, 30-39 MIN: ICD-10-PCS | Mod: S$GLB,,, | Performed by: NURSE PRACTITIONER

## 2021-03-31 PROCEDURE — 1126F AMNT PAIN NOTED NONE PRSNT: CPT | Mod: S$GLB,,, | Performed by: NURSE PRACTITIONER

## 2021-03-31 PROCEDURE — 99999 PR PBB SHADOW E&M-EST. PATIENT-LVL III: CPT | Mod: PBBFAC,,, | Performed by: NURSE PRACTITIONER

## 2021-03-31 PROCEDURE — 1126F PR PAIN SEVERITY QUANTIFIED, NO PAIN PRESENT: ICD-10-PCS | Mod: S$GLB,,, | Performed by: NURSE PRACTITIONER

## 2021-03-31 PROCEDURE — 99214 OFFICE O/P EST MOD 30 MIN: CPT | Mod: S$GLB,,, | Performed by: NURSE PRACTITIONER

## 2021-03-31 RX ORDER — DEXTROAMPHETAMINE SULFATE, DEXTROAMPHETAMINE SACCHARATE, AMPHETAMINE SULFATE AND AMPHETAMINE ASPARTATE 7.5; 7.5; 7.5; 7.5 MG/1; MG/1; MG/1; MG/1
60 CAPSULE, EXTENDED RELEASE ORAL EVERY MORNING
Qty: 180 CAPSULE | Refills: 0 | Status: SHIPPED | OUTPATIENT
Start: 2021-03-31 | End: 2021-06-25 | Stop reason: SDUPTHER

## 2021-04-09 ENCOUNTER — IMMUNIZATION (OUTPATIENT)
Dept: OBSTETRICS AND GYNECOLOGY | Facility: CLINIC | Age: 56
End: 2021-04-09
Payer: COMMERCIAL

## 2021-04-09 DIAGNOSIS — Z23 NEED FOR VACCINATION: Primary | ICD-10-CM

## 2021-04-09 PROCEDURE — 91300 COVID-19, MRNA, LNP-S, PF, 30 MCG/0.3 ML DOSE VACCINE: CPT | Mod: ,,, | Performed by: ANESTHESIOLOGY

## 2021-04-09 PROCEDURE — 0002A COVID-19, MRNA, LNP-S, PF, 30 MCG/0.3 ML DOSE VACCINE: ICD-10-PCS | Mod: CV19,,, | Performed by: ANESTHESIOLOGY

## 2021-04-09 PROCEDURE — 0002A COVID-19, MRNA, LNP-S, PF, 30 MCG/0.3 ML DOSE VACCINE: CPT | Mod: CV19,,, | Performed by: ANESTHESIOLOGY

## 2021-04-09 PROCEDURE — 91300 COVID-19, MRNA, LNP-S, PF, 30 MCG/0.3 ML DOSE VACCINE: ICD-10-PCS | Mod: ,,, | Performed by: ANESTHESIOLOGY

## 2021-06-21 DIAGNOSIS — E78.5 HYPERLIPIDEMIA, UNSPECIFIED HYPERLIPIDEMIA TYPE: ICD-10-CM

## 2021-06-21 DIAGNOSIS — I10 ESSENTIAL HYPERTENSION: ICD-10-CM

## 2021-06-22 RX ORDER — ATORVASTATIN CALCIUM 10 MG/1
10 TABLET, FILM COATED ORAL DAILY
Qty: 90 TABLET | Refills: 0 | Status: SHIPPED | OUTPATIENT
Start: 2021-06-22 | End: 2021-06-25 | Stop reason: HOSPADM

## 2021-06-22 RX ORDER — DOXAZOSIN 8 MG/1
8 TABLET ORAL DAILY
Qty: 90 TABLET | Refills: 1 | Status: SHIPPED | OUTPATIENT
Start: 2021-06-22 | End: 2021-12-16 | Stop reason: SDUPTHER

## 2021-06-22 RX ORDER — AMLODIPINE BESYLATE 10 MG/1
10 TABLET ORAL DAILY
Qty: 90 TABLET | Refills: 0 | Status: SHIPPED | OUTPATIENT
Start: 2021-06-22 | End: 2021-09-24 | Stop reason: SDUPTHER

## 2021-06-22 RX ORDER — TRIAMTERENE/HYDROCHLOROTHIAZID 37.5-25 MG
1 TABLET ORAL DAILY
Qty: 90 TABLET | Refills: 3 | Status: SHIPPED | OUTPATIENT
Start: 2021-06-22 | End: 2022-05-05 | Stop reason: SDUPTHER

## 2021-06-22 RX ORDER — LISINOPRIL 40 MG/1
40 TABLET ORAL DAILY
Qty: 90 TABLET | Refills: 0 | Status: SHIPPED | OUTPATIENT
Start: 2021-06-22 | End: 2021-09-24 | Stop reason: SDUPTHER

## 2021-06-25 ENCOUNTER — OFFICE VISIT (OUTPATIENT)
Dept: FAMILY MEDICINE | Facility: CLINIC | Age: 56
End: 2021-06-25
Payer: COMMERCIAL

## 2021-06-25 VITALS
OXYGEN SATURATION: 98 % | SYSTOLIC BLOOD PRESSURE: 130 MMHG | TEMPERATURE: 99 F | WEIGHT: 248.25 LBS | BODY MASS INDEX: 36.77 KG/M2 | HEART RATE: 81 BPM | DIASTOLIC BLOOD PRESSURE: 84 MMHG | HEIGHT: 69 IN

## 2021-06-25 DIAGNOSIS — Z72.0 TOBACCO USER: ICD-10-CM

## 2021-06-25 DIAGNOSIS — Z00.00 ANNUAL PHYSICAL EXAM: Primary | ICD-10-CM

## 2021-06-25 DIAGNOSIS — E66.01 CLASS 2 SEVERE OBESITY WITH SERIOUS COMORBIDITY AND BODY MASS INDEX (BMI) OF 36.0 TO 36.9 IN ADULT, UNSPECIFIED OBESITY TYPE: ICD-10-CM

## 2021-06-25 DIAGNOSIS — E78.2 MIXED HYPERLIPIDEMIA: ICD-10-CM

## 2021-06-25 DIAGNOSIS — F90.0 ADHD (ATTENTION DEFICIT HYPERACTIVITY DISORDER), INATTENTIVE TYPE: ICD-10-CM

## 2021-06-25 DIAGNOSIS — I10 ESSENTIAL HYPERTENSION: ICD-10-CM

## 2021-06-25 DIAGNOSIS — K21.9 GASTROESOPHAGEAL REFLUX DISEASE, UNSPECIFIED WHETHER ESOPHAGITIS PRESENT: ICD-10-CM

## 2021-06-25 PROCEDURE — 3008F PR BODY MASS INDEX (BMI) DOCUMENTED: ICD-10-PCS | Mod: CPTII,S$GLB,, | Performed by: NURSE PRACTITIONER

## 2021-06-25 PROCEDURE — 99999 PR PBB SHADOW E&M-EST. PATIENT-LVL IV: ICD-10-PCS | Mod: PBBFAC,,, | Performed by: NURSE PRACTITIONER

## 2021-06-25 PROCEDURE — 1126F AMNT PAIN NOTED NONE PRSNT: CPT | Mod: S$GLB,,, | Performed by: NURSE PRACTITIONER

## 2021-06-25 PROCEDURE — 99999 PR PBB SHADOW E&M-EST. PATIENT-LVL IV: CPT | Mod: PBBFAC,,, | Performed by: NURSE PRACTITIONER

## 2021-06-25 PROCEDURE — 99396 PREV VISIT EST AGE 40-64: CPT | Mod: S$GLB,,, | Performed by: NURSE PRACTITIONER

## 2021-06-25 PROCEDURE — 1126F PR PAIN SEVERITY QUANTIFIED, NO PAIN PRESENT: ICD-10-PCS | Mod: S$GLB,,, | Performed by: NURSE PRACTITIONER

## 2021-06-25 PROCEDURE — 99396 PR PREVENTIVE VISIT,EST,40-64: ICD-10-PCS | Mod: S$GLB,,, | Performed by: NURSE PRACTITIONER

## 2021-06-25 PROCEDURE — 3008F BODY MASS INDEX DOCD: CPT | Mod: CPTII,S$GLB,, | Performed by: NURSE PRACTITIONER

## 2021-06-25 RX ORDER — EZETIMIBE 10 MG/1
10 TABLET ORAL DAILY
Qty: 90 TABLET | Refills: 0 | Status: SHIPPED | OUTPATIENT
Start: 2021-06-25 | End: 2021-09-24 | Stop reason: SDUPTHER

## 2021-06-25 RX ORDER — DEXTROAMPHETAMINE SULFATE, DEXTROAMPHETAMINE SACCHARATE, AMPHETAMINE SULFATE AND AMPHETAMINE ASPARTATE 7.5; 7.5; 7.5; 7.5 MG/1; MG/1; MG/1; MG/1
60 CAPSULE, EXTENDED RELEASE ORAL EVERY MORNING
Qty: 180 CAPSULE | Refills: 0 | Status: SHIPPED | OUTPATIENT
Start: 2021-06-25 | End: 2021-09-30 | Stop reason: SDUPTHER

## 2021-08-05 RX ORDER — ESOMEPRAZOLE MAGNESIUM 40 MG/1
40 CAPSULE, DELAYED RELEASE ORAL DAILY
Qty: 90 CAPSULE | Refills: 3 | Status: SHIPPED | OUTPATIENT
Start: 2021-08-05 | End: 2022-08-05 | Stop reason: SDUPTHER

## 2021-08-16 ENCOUNTER — OFFICE VISIT (OUTPATIENT)
Dept: SPORTS MEDICINE | Facility: CLINIC | Age: 56
End: 2021-08-16
Payer: COMMERCIAL

## 2021-08-16 ENCOUNTER — HOSPITAL ENCOUNTER (OUTPATIENT)
Dept: RADIOLOGY | Facility: HOSPITAL | Age: 56
Discharge: HOME | End: 2021-08-16
Attending: ORTHOPAEDIC SURGERY
Payer: COMMERCIAL

## 2021-08-16 VITALS — WEIGHT: 250.88 LBS | TEMPERATURE: 99 F | HEIGHT: 69 IN | BODY MASS INDEX: 37.16 KG/M2

## 2021-08-16 DIAGNOSIS — M25.561 RIGHT KNEE PAIN, UNSPECIFIED CHRONICITY: ICD-10-CM

## 2021-08-16 DIAGNOSIS — M25.561 RIGHT KNEE PAIN, UNSPECIFIED CHRONICITY: Primary | ICD-10-CM

## 2021-08-16 DIAGNOSIS — M25.561 ACUTE PAIN OF RIGHT KNEE: ICD-10-CM

## 2021-08-16 PROCEDURE — 99999 PR PBB SHADOW E&M-EST. PATIENT-LVL III: ICD-10-PCS | Mod: PBBFAC,,, | Performed by: ORTHOPAEDIC SURGERY

## 2021-08-16 PROCEDURE — 73564 X-RAY EXAM KNEE 4 OR MORE: CPT | Mod: 26,50,, | Performed by: RADIOLOGY

## 2021-08-16 PROCEDURE — 1159F MED LIST DOCD IN RCRD: CPT | Mod: CPTII,S$GLB,, | Performed by: ORTHOPAEDIC SURGERY

## 2021-08-16 PROCEDURE — 1125F PR PAIN SEVERITY QUANTIFIED, PAIN PRESENT: ICD-10-PCS | Mod: CPTII,S$GLB,, | Performed by: ORTHOPAEDIC SURGERY

## 2021-08-16 PROCEDURE — 3008F PR BODY MASS INDEX (BMI) DOCUMENTED: ICD-10-PCS | Mod: CPTII,S$GLB,, | Performed by: ORTHOPAEDIC SURGERY

## 2021-08-16 PROCEDURE — 99203 PR OFFICE/OUTPT VISIT, NEW, LEVL III, 30-44 MIN: ICD-10-PCS | Mod: S$GLB,,, | Performed by: ORTHOPAEDIC SURGERY

## 2021-08-16 PROCEDURE — 1125F AMNT PAIN NOTED PAIN PRSNT: CPT | Mod: CPTII,S$GLB,, | Performed by: ORTHOPAEDIC SURGERY

## 2021-08-16 PROCEDURE — 3008F BODY MASS INDEX DOCD: CPT | Mod: CPTII,S$GLB,, | Performed by: ORTHOPAEDIC SURGERY

## 2021-08-16 PROCEDURE — 1159F PR MEDICATION LIST DOCUMENTED IN MEDICAL RECORD: ICD-10-PCS | Mod: CPTII,S$GLB,, | Performed by: ORTHOPAEDIC SURGERY

## 2021-08-16 PROCEDURE — 73564 X-RAY EXAM KNEE 4 OR MORE: CPT | Mod: TC,50

## 2021-08-16 PROCEDURE — 99203 OFFICE O/P NEW LOW 30 MIN: CPT | Mod: S$GLB,,, | Performed by: ORTHOPAEDIC SURGERY

## 2021-08-16 PROCEDURE — 73564 XR KNEE ORTHO BILAT WITH FLEXION: ICD-10-PCS | Mod: 26,50,, | Performed by: RADIOLOGY

## 2021-08-16 PROCEDURE — 99999 PR PBB SHADOW E&M-EST. PATIENT-LVL III: CPT | Mod: PBBFAC,,, | Performed by: ORTHOPAEDIC SURGERY

## 2021-09-21 ENCOUNTER — LAB VISIT (OUTPATIENT)
Dept: PRIMARY CARE CLINIC | Facility: OTHER | Age: 56
End: 2021-09-21
Attending: INTERNAL MEDICINE
Payer: COMMERCIAL

## 2021-09-21 ENCOUNTER — PATIENT MESSAGE (OUTPATIENT)
Dept: FAMILY MEDICINE | Facility: CLINIC | Age: 56
End: 2021-09-21

## 2021-09-21 DIAGNOSIS — Z20.822 ENCOUNTER FOR LABORATORY TESTING FOR COVID-19 VIRUS: ICD-10-CM

## 2021-09-21 PROCEDURE — U0003 INFECTIOUS AGENT DETECTION BY NUCLEIC ACID (DNA OR RNA); SEVERE ACUTE RESPIRATORY SYNDROME CORONAVIRUS 2 (SARS-COV-2) (CORONAVIRUS DISEASE [COVID-19]), AMPLIFIED PROBE TECHNIQUE, MAKING USE OF HIGH THROUGHPUT TECHNOLOGIES AS DESCRIBED BY CMS-2020-01-R: HCPCS | Performed by: INTERNAL MEDICINE

## 2021-09-22 LAB
SARS-COV-2 RNA RESP QL NAA+PROBE: NOT DETECTED
SARS-COV-2- CYCLE NUMBER: NORMAL

## 2021-09-24 DIAGNOSIS — F90.0 ADHD (ATTENTION DEFICIT HYPERACTIVITY DISORDER), INATTENTIVE TYPE: ICD-10-CM

## 2021-09-24 DIAGNOSIS — E78.2 MIXED HYPERLIPIDEMIA: ICD-10-CM

## 2021-09-24 RX ORDER — AMLODIPINE BESYLATE 10 MG/1
10 TABLET ORAL DAILY
Qty: 90 TABLET | Refills: 0 | Status: SHIPPED | OUTPATIENT
Start: 2021-09-24 | End: 2021-12-16 | Stop reason: SDUPTHER

## 2021-09-24 RX ORDER — EZETIMIBE 10 MG/1
10 TABLET ORAL DAILY
Qty: 90 TABLET | Refills: 0 | Status: SHIPPED | OUTPATIENT
Start: 2021-09-24 | End: 2021-12-16 | Stop reason: SDUPTHER

## 2021-09-24 RX ORDER — DEXTROAMPHETAMINE SULFATE, DEXTROAMPHETAMINE SACCHARATE, AMPHETAMINE SULFATE AND AMPHETAMINE ASPARTATE 7.5; 7.5; 7.5; 7.5 MG/1; MG/1; MG/1; MG/1
60 CAPSULE, EXTENDED RELEASE ORAL EVERY MORNING
Qty: 180 CAPSULE | Refills: 0 | OUTPATIENT
Start: 2021-09-24

## 2021-09-24 RX ORDER — LISINOPRIL 40 MG/1
40 TABLET ORAL DAILY
Qty: 90 TABLET | Refills: 0 | Status: SHIPPED | OUTPATIENT
Start: 2021-09-24 | End: 2021-12-16 | Stop reason: SDUPTHER

## 2021-09-30 ENCOUNTER — OFFICE VISIT (OUTPATIENT)
Dept: FAMILY MEDICINE | Facility: CLINIC | Age: 56
End: 2021-09-30
Payer: COMMERCIAL

## 2021-09-30 VITALS
SYSTOLIC BLOOD PRESSURE: 120 MMHG | RESPIRATION RATE: 18 BRPM | WEIGHT: 248.88 LBS | OXYGEN SATURATION: 98 % | DIASTOLIC BLOOD PRESSURE: 74 MMHG | HEART RATE: 79 BPM | BODY MASS INDEX: 36.86 KG/M2 | HEIGHT: 69 IN | TEMPERATURE: 98 F

## 2021-09-30 DIAGNOSIS — E66.01 CLASS 2 SEVERE OBESITY WITH SERIOUS COMORBIDITY AND BODY MASS INDEX (BMI) OF 37.0 TO 37.9 IN ADULT, UNSPECIFIED OBESITY TYPE: ICD-10-CM

## 2021-09-30 DIAGNOSIS — E78.2 MIXED HYPERLIPIDEMIA: ICD-10-CM

## 2021-09-30 DIAGNOSIS — F90.0 ADHD (ATTENTION DEFICIT HYPERACTIVITY DISORDER), INATTENTIVE TYPE: ICD-10-CM

## 2021-09-30 DIAGNOSIS — Z72.0 TOBACCO USER: ICD-10-CM

## 2021-09-30 DIAGNOSIS — I10 ESSENTIAL HYPERTENSION: Primary | ICD-10-CM

## 2021-09-30 PROCEDURE — 1159F MED LIST DOCD IN RCRD: CPT | Mod: CPTII,S$GLB,, | Performed by: NURSE PRACTITIONER

## 2021-09-30 PROCEDURE — 3008F BODY MASS INDEX DOCD: CPT | Mod: CPTII,S$GLB,, | Performed by: NURSE PRACTITIONER

## 2021-09-30 PROCEDURE — 99214 OFFICE O/P EST MOD 30 MIN: CPT | Mod: S$GLB,,, | Performed by: NURSE PRACTITIONER

## 2021-09-30 PROCEDURE — 99999 PR PBB SHADOW E&M-EST. PATIENT-LVL III: CPT | Mod: PBBFAC,,, | Performed by: NURSE PRACTITIONER

## 2021-09-30 PROCEDURE — 3078F DIAST BP <80 MM HG: CPT | Mod: CPTII,S$GLB,, | Performed by: NURSE PRACTITIONER

## 2021-09-30 PROCEDURE — 1159F PR MEDICATION LIST DOCUMENTED IN MEDICAL RECORD: ICD-10-PCS | Mod: CPTII,S$GLB,, | Performed by: NURSE PRACTITIONER

## 2021-09-30 PROCEDURE — 4010F PR ACE/ARB THEARPY RXD/TAKEN: ICD-10-PCS | Mod: CPTII,S$GLB,, | Performed by: NURSE PRACTITIONER

## 2021-09-30 PROCEDURE — 99214 PR OFFICE/OUTPT VISIT, EST, LEVL IV, 30-39 MIN: ICD-10-PCS | Mod: S$GLB,,, | Performed by: NURSE PRACTITIONER

## 2021-09-30 PROCEDURE — 3008F PR BODY MASS INDEX (BMI) DOCUMENTED: ICD-10-PCS | Mod: CPTII,S$GLB,, | Performed by: NURSE PRACTITIONER

## 2021-09-30 PROCEDURE — 3074F SYST BP LT 130 MM HG: CPT | Mod: CPTII,S$GLB,, | Performed by: NURSE PRACTITIONER

## 2021-09-30 PROCEDURE — 3078F PR MOST RECENT DIASTOLIC BLOOD PRESSURE < 80 MM HG: ICD-10-PCS | Mod: CPTII,S$GLB,, | Performed by: NURSE PRACTITIONER

## 2021-09-30 PROCEDURE — 4010F ACE/ARB THERAPY RXD/TAKEN: CPT | Mod: CPTII,S$GLB,, | Performed by: NURSE PRACTITIONER

## 2021-09-30 PROCEDURE — 3074F PR MOST RECENT SYSTOLIC BLOOD PRESSURE < 130 MM HG: ICD-10-PCS | Mod: CPTII,S$GLB,, | Performed by: NURSE PRACTITIONER

## 2021-09-30 PROCEDURE — 99999 PR PBB SHADOW E&M-EST. PATIENT-LVL III: ICD-10-PCS | Mod: PBBFAC,,, | Performed by: NURSE PRACTITIONER

## 2021-09-30 RX ORDER — DEXTROAMPHETAMINE SULFATE, DEXTROAMPHETAMINE SACCHARATE, AMPHETAMINE SULFATE AND AMPHETAMINE ASPARTATE 7.5; 7.5; 7.5; 7.5 MG/1; MG/1; MG/1; MG/1
60 CAPSULE, EXTENDED RELEASE ORAL EVERY MORNING
Qty: 180 CAPSULE | Refills: 0 | Status: SHIPPED | OUTPATIENT
Start: 2021-09-30 | End: 2021-12-30 | Stop reason: SDUPTHER

## 2021-10-20 ENCOUNTER — PATIENT MESSAGE (OUTPATIENT)
Dept: FAMILY MEDICINE | Facility: CLINIC | Age: 56
End: 2021-10-20

## 2021-10-20 ENCOUNTER — OFFICE VISIT (OUTPATIENT)
Dept: FAMILY MEDICINE | Facility: CLINIC | Age: 56
End: 2021-10-20
Payer: COMMERCIAL

## 2021-10-20 ENCOUNTER — TELEPHONE (OUTPATIENT)
Dept: FAMILY MEDICINE | Facility: CLINIC | Age: 56
End: 2021-10-20

## 2021-10-20 DIAGNOSIS — L08.9 FINGER INFECTION: Primary | ICD-10-CM

## 2021-10-20 PROCEDURE — 1160F PR REVIEW ALL MEDS BY PRESCRIBER/CLIN PHARMACIST DOCUMENTED: ICD-10-PCS | Mod: CPTII,95,, | Performed by: NURSE PRACTITIONER

## 2021-10-20 PROCEDURE — 1160F RVW MEDS BY RX/DR IN RCRD: CPT | Mod: CPTII,95,, | Performed by: NURSE PRACTITIONER

## 2021-10-20 PROCEDURE — 4010F ACE/ARB THERAPY RXD/TAKEN: CPT | Mod: CPTII,95,, | Performed by: NURSE PRACTITIONER

## 2021-10-20 PROCEDURE — 1159F PR MEDICATION LIST DOCUMENTED IN MEDICAL RECORD: ICD-10-PCS | Mod: CPTII,95,, | Performed by: NURSE PRACTITIONER

## 2021-10-20 PROCEDURE — 4010F PR ACE/ARB THEARPY RXD/TAKEN: ICD-10-PCS | Mod: CPTII,95,, | Performed by: NURSE PRACTITIONER

## 2021-10-20 PROCEDURE — 99213 OFFICE O/P EST LOW 20 MIN: CPT | Mod: 95,,, | Performed by: NURSE PRACTITIONER

## 2021-10-20 PROCEDURE — 99213 PR OFFICE/OUTPT VISIT, EST, LEVL III, 20-29 MIN: ICD-10-PCS | Mod: 95,,, | Performed by: NURSE PRACTITIONER

## 2021-10-20 PROCEDURE — 1159F MED LIST DOCD IN RCRD: CPT | Mod: CPTII,95,, | Performed by: NURSE PRACTITIONER

## 2021-10-20 RX ORDER — CEPHALEXIN 500 MG/1
500 CAPSULE ORAL EVERY 8 HOURS
Qty: 21 CAPSULE | Refills: 0 | Status: SHIPPED | OUTPATIENT
Start: 2021-10-20 | End: 2021-10-27

## 2021-12-05 ENCOUNTER — IMMUNIZATION (OUTPATIENT)
Dept: PRIMARY CARE CLINIC | Facility: CLINIC | Age: 56
End: 2021-12-05
Payer: COMMERCIAL

## 2021-12-05 DIAGNOSIS — Z23 NEED FOR VACCINATION: Primary | ICD-10-CM

## 2021-12-05 PROCEDURE — 0003A COVID-19, MRNA, LNP-S, PF, 30 MCG/0.3 ML DOSE VACCINE: CPT | Mod: CV19,PBBFAC | Performed by: INTERNAL MEDICINE

## 2021-12-05 PROCEDURE — 91300 COVID-19, MRNA, LNP-S, PF, 30 MCG/0.3 ML DOSE VACCINE: CPT | Mod: PBBFAC | Performed by: INTERNAL MEDICINE

## 2021-12-16 DIAGNOSIS — I10 ESSENTIAL HYPERTENSION: ICD-10-CM

## 2021-12-16 DIAGNOSIS — E78.2 MIXED HYPERLIPIDEMIA: ICD-10-CM

## 2021-12-16 RX ORDER — LISINOPRIL 40 MG/1
40 TABLET ORAL DAILY
Qty: 90 TABLET | Refills: 0 | Status: SHIPPED | OUTPATIENT
Start: 2021-12-16 | End: 2022-03-22 | Stop reason: SDUPTHER

## 2021-12-16 RX ORDER — AMLODIPINE BESYLATE 10 MG/1
10 TABLET ORAL DAILY
Qty: 90 TABLET | Refills: 0 | Status: SHIPPED | OUTPATIENT
Start: 2021-12-16 | End: 2022-03-22 | Stop reason: SDUPTHER

## 2021-12-16 RX ORDER — EZETIMIBE 10 MG/1
10 TABLET ORAL DAILY
Qty: 90 TABLET | Refills: 0 | Status: SHIPPED | OUTPATIENT
Start: 2021-12-16 | End: 2022-03-22 | Stop reason: SDUPTHER

## 2021-12-16 RX ORDER — DOXAZOSIN 8 MG/1
8 TABLET ORAL DAILY
Qty: 90 TABLET | Refills: 1 | Status: SHIPPED | OUTPATIENT
Start: 2021-12-16 | End: 2022-05-05 | Stop reason: SDUPTHER

## 2021-12-30 ENCOUNTER — TELEPHONE (OUTPATIENT)
Dept: FAMILY MEDICINE | Facility: CLINIC | Age: 56
End: 2021-12-30
Payer: COMMERCIAL

## 2021-12-30 ENCOUNTER — OFFICE VISIT (OUTPATIENT)
Dept: FAMILY MEDICINE | Facility: CLINIC | Age: 56
End: 2021-12-30
Payer: COMMERCIAL

## 2021-12-30 VITALS
HEIGHT: 69 IN | TEMPERATURE: 99 F | WEIGHT: 239.88 LBS | DIASTOLIC BLOOD PRESSURE: 80 MMHG | RESPIRATION RATE: 18 BRPM | HEART RATE: 76 BPM | SYSTOLIC BLOOD PRESSURE: 124 MMHG | OXYGEN SATURATION: 97 % | BODY MASS INDEX: 35.53 KG/M2

## 2021-12-30 DIAGNOSIS — I10 ESSENTIAL HYPERTENSION: Primary | ICD-10-CM

## 2021-12-30 DIAGNOSIS — Z12.2 SCREENING FOR LUNG CANCER: ICD-10-CM

## 2021-12-30 DIAGNOSIS — Z23 FLU VACCINE NEED: ICD-10-CM

## 2021-12-30 DIAGNOSIS — F90.0 ADHD (ATTENTION DEFICIT HYPERACTIVITY DISORDER), INATTENTIVE TYPE: ICD-10-CM

## 2021-12-30 DIAGNOSIS — Z72.0 TOBACCO USER: ICD-10-CM

## 2021-12-30 PROCEDURE — 90686 FLU VACCINE (QUAD) GREATER THAN OR EQUAL TO 3YO PRESERVATIVE FREE IM: ICD-10-PCS | Mod: S$GLB,,, | Performed by: NURSE PRACTITIONER

## 2021-12-30 PROCEDURE — 3079F PR MOST RECENT DIASTOLIC BLOOD PRESSURE 80-89 MM HG: ICD-10-PCS | Mod: CPTII,S$GLB,, | Performed by: NURSE PRACTITIONER

## 2021-12-30 PROCEDURE — 1160F PR REVIEW ALL MEDS BY PRESCRIBER/CLIN PHARMACIST DOCUMENTED: ICD-10-PCS | Mod: CPTII,S$GLB,, | Performed by: NURSE PRACTITIONER

## 2021-12-30 PROCEDURE — 3008F BODY MASS INDEX DOCD: CPT | Mod: CPTII,S$GLB,, | Performed by: NURSE PRACTITIONER

## 2021-12-30 PROCEDURE — 3008F PR BODY MASS INDEX (BMI) DOCUMENTED: ICD-10-PCS | Mod: CPTII,S$GLB,, | Performed by: NURSE PRACTITIONER

## 2021-12-30 PROCEDURE — 99214 PR OFFICE/OUTPT VISIT, EST, LEVL IV, 30-39 MIN: ICD-10-PCS | Mod: 25,S$GLB,, | Performed by: NURSE PRACTITIONER

## 2021-12-30 PROCEDURE — 1159F PR MEDICATION LIST DOCUMENTED IN MEDICAL RECORD: ICD-10-PCS | Mod: CPTII,S$GLB,, | Performed by: NURSE PRACTITIONER

## 2021-12-30 PROCEDURE — 3074F SYST BP LT 130 MM HG: CPT | Mod: CPTII,S$GLB,, | Performed by: NURSE PRACTITIONER

## 2021-12-30 PROCEDURE — 3074F PR MOST RECENT SYSTOLIC BLOOD PRESSURE < 130 MM HG: ICD-10-PCS | Mod: CPTII,S$GLB,, | Performed by: NURSE PRACTITIONER

## 2021-12-30 PROCEDURE — 4010F PR ACE/ARB THEARPY RXD/TAKEN: ICD-10-PCS | Mod: CPTII,S$GLB,, | Performed by: NURSE PRACTITIONER

## 2021-12-30 PROCEDURE — 99999 PR PBB SHADOW E&M-EST. PATIENT-LVL IV: ICD-10-PCS | Mod: PBBFAC,,, | Performed by: NURSE PRACTITIONER

## 2021-12-30 PROCEDURE — 1160F RVW MEDS BY RX/DR IN RCRD: CPT | Mod: CPTII,S$GLB,, | Performed by: NURSE PRACTITIONER

## 2021-12-30 PROCEDURE — 90471 IMMUNIZATION ADMIN: CPT | Mod: S$GLB,,, | Performed by: NURSE PRACTITIONER

## 2021-12-30 PROCEDURE — 1159F MED LIST DOCD IN RCRD: CPT | Mod: CPTII,S$GLB,, | Performed by: NURSE PRACTITIONER

## 2021-12-30 PROCEDURE — 90686 IIV4 VACC NO PRSV 0.5 ML IM: CPT | Mod: S$GLB,,, | Performed by: NURSE PRACTITIONER

## 2021-12-30 PROCEDURE — 99999 PR PBB SHADOW E&M-EST. PATIENT-LVL IV: CPT | Mod: PBBFAC,,, | Performed by: NURSE PRACTITIONER

## 2021-12-30 PROCEDURE — 90471 FLU VACCINE (QUAD) GREATER THAN OR EQUAL TO 3YO PRESERVATIVE FREE IM: ICD-10-PCS | Mod: S$GLB,,, | Performed by: NURSE PRACTITIONER

## 2021-12-30 PROCEDURE — 3079F DIAST BP 80-89 MM HG: CPT | Mod: CPTII,S$GLB,, | Performed by: NURSE PRACTITIONER

## 2021-12-30 PROCEDURE — 99214 OFFICE O/P EST MOD 30 MIN: CPT | Mod: 25,S$GLB,, | Performed by: NURSE PRACTITIONER

## 2021-12-30 PROCEDURE — 4010F ACE/ARB THERAPY RXD/TAKEN: CPT | Mod: CPTII,S$GLB,, | Performed by: NURSE PRACTITIONER

## 2021-12-30 RX ORDER — DEXTROAMPHETAMINE SULFATE, DEXTROAMPHETAMINE SACCHARATE, AMPHETAMINE SULFATE AND AMPHETAMINE ASPARTATE 7.5; 7.5; 7.5; 7.5 MG/1; MG/1; MG/1; MG/1
60 CAPSULE, EXTENDED RELEASE ORAL EVERY MORNING
Qty: 180 CAPSULE | Refills: 0 | Status: SHIPPED | OUTPATIENT
Start: 2021-12-30 | End: 2022-02-16 | Stop reason: SDUPTHER

## 2022-01-26 ENCOUNTER — PATIENT MESSAGE (OUTPATIENT)
Dept: FAMILY MEDICINE | Facility: CLINIC | Age: 57
End: 2022-01-26
Payer: COMMERCIAL

## 2022-01-27 ENCOUNTER — PATIENT MESSAGE (OUTPATIENT)
Dept: FAMILY MEDICINE | Facility: CLINIC | Age: 57
End: 2022-01-27
Payer: COMMERCIAL

## 2022-02-16 ENCOUNTER — PATIENT MESSAGE (OUTPATIENT)
Dept: FAMILY MEDICINE | Facility: CLINIC | Age: 57
End: 2022-02-16
Payer: COMMERCIAL

## 2022-02-16 DIAGNOSIS — F90.0 ADHD (ATTENTION DEFICIT HYPERACTIVITY DISORDER), INATTENTIVE TYPE: ICD-10-CM

## 2022-02-16 RX ORDER — DEXTROAMPHETAMINE SULFATE, DEXTROAMPHETAMINE SACCHARATE, AMPHETAMINE SULFATE AND AMPHETAMINE ASPARTATE 7.5; 7.5; 7.5; 7.5 MG/1; MG/1; MG/1; MG/1
60 CAPSULE, EXTENDED RELEASE ORAL EVERY MORNING
Qty: 180 CAPSULE | Refills: 0 | Status: SHIPPED | OUTPATIENT
Start: 2022-02-16 | End: 2022-05-16 | Stop reason: SDUPTHER

## 2022-02-17 ENCOUNTER — TELEPHONE (OUTPATIENT)
Dept: FAMILY MEDICINE | Facility: CLINIC | Age: 57
End: 2022-02-17
Payer: COMMERCIAL

## 2022-02-17 NOTE — TELEPHONE ENCOUNTER
----- Message from Dayana Gamboa sent at 2/17/2022 11:16 AM CST -----  Type:  [facility] Requesting Call Back    Who Called:Liyah for Nick Mcdonough  Would the patient rather a call back or a response via [facility]? Call back  Best Call Back Number:[phone]  Additional Information: [facility] requesting call back regarding RX authorization  for ADDERALL XR 30 mg 24 hr cap

## 2022-02-17 NOTE — TELEPHONE ENCOUNTER
Spoke with patient- advised him that I spoke with Joselyn at [facility] was told that she spoke with someone at [facility] was told that PA has been initiated but no approval Joselyn said she will contacted insurance to find out what is going on with the PA- I call patient to advised him of this information.

## 2022-03-09 NOTE — PROGRESS NOTES
Subjective:      Nick Mcdonough Sr. is a 54 y.o. male who was referred to me by Giulia Solis in consultation for hearing loss.    Mr. Mcdonough reports decreased hearing in both ears for the past several years. He has associated tinnitus in both ears. He denies dizziness, ear pain or ear drainage. 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 He acknowledges wearing hearing protection most of the time.  There is not a family history of hearing loss at a young age.  There is not a prior history of ear surgery.  There is not a prior history of ear infections .  He admits to a history of significant noise exposure.  He does not wear hearing aids currently.  He has not had a hearing test recently.       Past Medical History  He has a past medical history of Adult ADHD, Alcohol dependency, Colon polyp, GERD (gastroesophageal reflux disease), Hyperlipidemia, and Hypertension.    Past Surgical History  He has a past surgical history that includes Knee surgery (Right); Vasectomy; Hernia repair; Inguinal hernia repair (Left, 2012); and Colonoscopy (N/A, 2/2/2018).    Family History  His family history includes Alcohol abuse in his paternal grandfather; Cancer in his maternal grandmother; Heart disease in his brother and paternal grandmother; Hypertension in his father; No Known Problems in his maternal grandfather.    Social History  He reports that he has been smoking. He has a 15.00 pack-year smoking history. He has never used smokeless tobacco. He reports that he does not drink alcohol or use drugs.    Allergies  He is allergic to pcn [penicillins].    Medications  He has a current medication list which includes the following prescription(s): adderall xr, amlodipine, doxazosin, esomeprazole, fish oil-omega-3 fatty acids, lisinopril, niacin, pravastatin, and triamterene-hydrochlorothiazide 37.5-25 mg.    Review of Systems   Constitutional: Negative for chills, fever and unexpected  Please use over-the-counter medications as needed for your symptoms.  If your COVID-19 or influenza tests are positive you will be called with results, you can also review the results on [facility].  Please return to the ED if there is worsening symptoms.   weight change.   HENT: Positive for hearing loss and tinnitus. Negative for ear discharge, ear pain, sore throat and trouble swallowing.    Eyes: Negative for pain and visual disturbance.   Respiratory: Negative for apnea and shortness of breath.    Cardiovascular: Negative for chest pain and palpitations.   Gastrointestinal: Negative for abdominal pain and nausea.   Endocrine: Negative for cold intolerance and heat intolerance.   Musculoskeletal: Negative for joint swelling and neck stiffness.   Skin: Negative for color change and rash.   Neurological: Negative for dizziness, facial asymmetry and headaches.   Hematological: Negative for adenopathy. Does not bruise/bleed easily.   Psychiatric/Behavioral: Negative for agitation and sleep disturbance. The patient is not nervous/anxious.           Objective:     There were no vitals taken for this visit.     Constitutional:   Vital signs are normal. He appears well-developed and well-nourished.     Head:  Normocephalic and atraumatic.     Ears:    Right Ear: No lacerations. No drainage, swelling or tenderness. No foreign bodies. No mastoid tenderness. Tympanic membrane is not injected, not scarred, not perforated, not erythematous, not retracted and not bulging. Tympanic membrane mobility is normal. No middle ear effusion. No hemotympanum. Decreased hearing is noted.   Left Ear: No lacerations. No drainage, swelling or tenderness. No foreign bodies. No mastoid tenderness. Tympanic membrane is not injected, not scarred, not perforated, not erythematous, not retracted and not bulging. Tympanic membrane mobility is normal.  No middle ear effusion. No hemotympanum. Decreased hearing is noted.     Nose:  Nose normal including turbinates, nasal mucosa, sinuses and nasal septum.     Mouth/Throat  Lips, teeth, and gums normal and oropharynx normal.     Neck:  Neck normal without thyromegaly masses, asymmetry, normal tracheal structure, crepitus, and tenderness and no  adenopathy.     Psychiatric:   He has a normal mood and affect.       Procedure    None        Data Reviewed    WBC (K/uL)   Date Value   03/09/2019 8.71     Platelets (K/uL)   Date Value   03/09/2019 237      Creatinine (mg/dL)   Date Value   09/28/2019 1.01     TSH (uIU/mL)   Date Value   03/09/2019 2.580     Glucose (mg/dL)   Date Value   09/28/2019 107     No results found for: HGBA1C    I independently reviewed the tracings of the complete audiometric evaluation performed today.  I reviewed the audiogram with the patient as well.  Pertinent findings include binaural sloping HF sensorineural hearing loss with normal tymps.         Assessment:     1. Sensorineural hearing loss (SNHL) of both ears    2. Tinnitus, bilateral         Plan:     I had a long discussion with the patient regarding his condition and the further workup and management options.    Audiogram Reviewed: Bilateral SNHL.  Hearing conservation strongly recommended.  Pamphlet on hearing loss and hear protection provided to patient.  Trial of amplification bilaterally also recommended. Ladonna Tavarez's card provided to patient.  I reassured him and provided literature and reputable web-based resources for his perusal. Tinnitus is most commonly associated with hearing loss.      Re-check of hearing in 1 year or sooner if subjective change noted.      Follow up in about 1 year (around 5/29/2021), or if symptoms worsen or fail to improve.

## 2022-03-22 DIAGNOSIS — E78.2 MIXED HYPERLIPIDEMIA: ICD-10-CM

## 2022-03-23 RX ORDER — EZETIMIBE 10 MG/1
10 TABLET ORAL DAILY
Qty: 90 TABLET | Refills: 0 | Status: SHIPPED | OUTPATIENT
Start: 2022-03-23 | End: 2022-05-05 | Stop reason: SDUPTHER

## 2022-03-23 RX ORDER — LISINOPRIL 40 MG/1
40 TABLET ORAL DAILY
Qty: 90 TABLET | Refills: 0 | Status: SHIPPED | OUTPATIENT
Start: 2022-03-23 | End: 2022-05-05 | Stop reason: SDUPTHER

## 2022-03-23 RX ORDER — AMLODIPINE BESYLATE 10 MG/1
10 TABLET ORAL DAILY
Qty: 90 TABLET | Refills: 0 | Status: SHIPPED | OUTPATIENT
Start: 2022-03-23 | End: 2022-05-05 | Stop reason: SDUPTHER

## 2022-05-05 DIAGNOSIS — I10 ESSENTIAL HYPERTENSION: ICD-10-CM

## 2022-05-05 DIAGNOSIS — E78.2 MIXED HYPERLIPIDEMIA: ICD-10-CM

## 2022-05-05 RX ORDER — TRIAMTERENE/HYDROCHLOROTHIAZID 37.5-25 MG
1 TABLET ORAL DAILY
Qty: 90 TABLET | Refills: 3 | Status: SHIPPED | OUTPATIENT
Start: 2022-05-05 | End: 2022-05-24 | Stop reason: SDUPTHER

## 2022-05-05 RX ORDER — DOXAZOSIN 8 MG/1
8 TABLET ORAL DAILY
Qty: 90 TABLET | Refills: 1 | Status: SHIPPED | OUTPATIENT
Start: 2022-05-05 | End: 2022-11-11 | Stop reason: SDUPTHER

## 2022-05-05 RX ORDER — AMLODIPINE BESYLATE 10 MG/1
10 TABLET ORAL DAILY
Qty: 90 TABLET | Refills: 0 | Status: SHIPPED | OUTPATIENT
Start: 2022-05-05 | End: 2022-09-09 | Stop reason: SDUPTHER

## 2022-05-05 RX ORDER — LISINOPRIL 40 MG/1
40 TABLET ORAL DAILY
Qty: 90 TABLET | Refills: 0 | Status: SHIPPED | OUTPATIENT
Start: 2022-05-05 | End: 2022-09-09 | Stop reason: SDUPTHER

## 2022-05-05 RX ORDER — EZETIMIBE 10 MG/1
10 TABLET ORAL DAILY
Qty: 90 TABLET | Refills: 0 | Status: SHIPPED | OUTPATIENT
Start: 2022-05-05 | End: 2022-09-09 | Stop reason: SDUPTHER

## 2022-05-16 ENCOUNTER — TELEPHONE (OUTPATIENT)
Dept: FAMILY MEDICINE | Facility: CLINIC | Age: 57
End: 2022-05-16
Payer: COMMERCIAL

## 2022-05-16 ENCOUNTER — OFFICE VISIT (OUTPATIENT)
Dept: FAMILY MEDICINE | Facility: CLINIC | Age: 57
End: 2022-05-16
Payer: COMMERCIAL

## 2022-05-16 VITALS
WEIGHT: 263 LBS | BODY MASS INDEX: 39.18 KG/M2 | TEMPERATURE: 99 F | OXYGEN SATURATION: 98 % | SYSTOLIC BLOOD PRESSURE: 126 MMHG | HEART RATE: 89 BPM | DIASTOLIC BLOOD PRESSURE: 72 MMHG | RESPIRATION RATE: 18 BRPM

## 2022-05-16 DIAGNOSIS — Z13.1 DIABETES MELLITUS SCREENING: ICD-10-CM

## 2022-05-16 DIAGNOSIS — E78.2 MIXED HYPERLIPIDEMIA: ICD-10-CM

## 2022-05-16 DIAGNOSIS — Z12.5 SCREENING PSA (PROSTATE SPECIFIC ANTIGEN): ICD-10-CM

## 2022-05-16 DIAGNOSIS — Z13.0 SCREENING FOR DEFICIENCY ANEMIA: ICD-10-CM

## 2022-05-16 DIAGNOSIS — Z72.0 TOBACCO USER: ICD-10-CM

## 2022-05-16 DIAGNOSIS — Z00.00 ENCOUNTER FOR BLOOD TEST FOR ROUTINE GENERAL PHYSICAL EXAMINATION: ICD-10-CM

## 2022-05-16 DIAGNOSIS — I10 ESSENTIAL HYPERTENSION: ICD-10-CM

## 2022-05-16 DIAGNOSIS — Z13.29 THYROID DISORDER SCREEN: ICD-10-CM

## 2022-05-16 DIAGNOSIS — F90.0 ADHD (ATTENTION DEFICIT HYPERACTIVITY DISORDER), INATTENTIVE TYPE: Primary | ICD-10-CM

## 2022-05-16 PROCEDURE — 1160F RVW MEDS BY RX/DR IN RCRD: CPT | Mod: CPTII,S$GLB,, | Performed by: NURSE PRACTITIONER

## 2022-05-16 PROCEDURE — 99999 PR PBB SHADOW E&M-EST. PATIENT-LVL IV: CPT | Mod: PBBFAC,,, | Performed by: NURSE PRACTITIONER

## 2022-05-16 PROCEDURE — 3008F BODY MASS INDEX DOCD: CPT | Mod: CPTII,S$GLB,, | Performed by: NURSE PRACTITIONER

## 2022-05-16 PROCEDURE — 1160F PR REVIEW ALL MEDS BY PRESCRIBER/CLIN PHARMACIST DOCUMENTED: ICD-10-PCS | Mod: CPTII,S$GLB,, | Performed by: NURSE PRACTITIONER

## 2022-05-16 PROCEDURE — 3074F PR MOST RECENT SYSTOLIC BLOOD PRESSURE < 130 MM HG: ICD-10-PCS | Mod: CPTII,S$GLB,, | Performed by: NURSE PRACTITIONER

## 2022-05-16 PROCEDURE — 99213 PR OFFICE/OUTPT VISIT, EST, LEVL III, 20-29 MIN: ICD-10-PCS | Mod: S$GLB,,, | Performed by: NURSE PRACTITIONER

## 2022-05-16 PROCEDURE — 99213 OFFICE O/P EST LOW 20 MIN: CPT | Mod: S$GLB,,, | Performed by: NURSE PRACTITIONER

## 2022-05-16 PROCEDURE — 4010F ACE/ARB THERAPY RXD/TAKEN: CPT | Mod: CPTII,S$GLB,, | Performed by: NURSE PRACTITIONER

## 2022-05-16 PROCEDURE — 3078F PR MOST RECENT DIASTOLIC BLOOD PRESSURE < 80 MM HG: ICD-10-PCS | Mod: CPTII,S$GLB,, | Performed by: NURSE PRACTITIONER

## 2022-05-16 PROCEDURE — 1159F PR MEDICATION LIST DOCUMENTED IN MEDICAL RECORD: ICD-10-PCS | Mod: CPTII,S$GLB,, | Performed by: NURSE PRACTITIONER

## 2022-05-16 PROCEDURE — 3008F PR BODY MASS INDEX (BMI) DOCUMENTED: ICD-10-PCS | Mod: CPTII,S$GLB,, | Performed by: NURSE PRACTITIONER

## 2022-05-16 PROCEDURE — 4010F PR ACE/ARB THEARPY RXD/TAKEN: ICD-10-PCS | Mod: CPTII,S$GLB,, | Performed by: NURSE PRACTITIONER

## 2022-05-16 PROCEDURE — 3074F SYST BP LT 130 MM HG: CPT | Mod: CPTII,S$GLB,, | Performed by: NURSE PRACTITIONER

## 2022-05-16 PROCEDURE — 1159F MED LIST DOCD IN RCRD: CPT | Mod: CPTII,S$GLB,, | Performed by: NURSE PRACTITIONER

## 2022-05-16 PROCEDURE — 3078F DIAST BP <80 MM HG: CPT | Mod: CPTII,S$GLB,, | Performed by: NURSE PRACTITIONER

## 2022-05-16 PROCEDURE — 99999 PR PBB SHADOW E&M-EST. PATIENT-LVL IV: ICD-10-PCS | Mod: PBBFAC,,, | Performed by: NURSE PRACTITIONER

## 2022-05-16 RX ORDER — DEXTROAMPHETAMINE SULFATE, DEXTROAMPHETAMINE SACCHARATE, AMPHETAMINE SULFATE AND AMPHETAMINE ASPARTATE 7.5; 7.5; 7.5; 7.5 MG/1; MG/1; MG/1; MG/1
60 CAPSULE, EXTENDED RELEASE ORAL EVERY MORNING
Qty: 180 CAPSULE | Refills: 0 | Status: SHIPPED | OUTPATIENT
Start: 2022-05-16 | End: 2022-08-11 | Stop reason: SDUPTHER

## 2022-05-16 NOTE — TELEPHONE ENCOUNTER
----- Message from Chuy Kelley sent at 5/16/2022  8:32 AM CDT -----  Contact: pt.  .Type:  Sooner Apoointment Request    Caller is requesting a sooner appointment.  Caller declined first available appointment listed below.  Caller will not accept being placed on the waitlist and is requesting a message be sent to doctor.  Name of Caller: pt  When is the first available appointment?05/16/22  Symptoms:  Would the patient rather a call back or a response via [facility]? Call back  Best Call Back Number:[phone]  Additional Information: Pt. Is  requesting call back regarding an earlier appt.  For today.

## 2022-05-16 NOTE — PROGRESS NOTES
Subjective:       Patient ID: Nick Mcdonough Sr. is a 56 y.o. male.    Chief Complaint: Follow-up (3 months F/U)    HPI    Patient is a 56-year-old white male with Hypertension, Hyperlipidemia, chronic GERD, ADHD, tobacco user, fluid retention to lower extremities with venous stasis dermatitis, and obesity that is here today for 3 MONTH follow up..     Hypertension  Currently taking Lisinopril 40 mg daily, Maxzide 25 mg daily, Doxazosin 8 mg daily and Amlodipine 10 mg daily.   Blood pressure is well controlled on these medications with no fluid retention.  /72 (BP Location: Right arm, Patient Position: Sitting, BP Method: Large (Manual))   Pulse 89   Temp 99 °F (37.2 °C) (Temporal)   Resp 18   Wt 119.3 kg (263 lb 0.1 oz)   SpO2 98%   BMI 39.18 kg/m²      Hyperlipidemia  Currently taking Zetia 10 mg daily in place of Atorvastatin 10 mg daily due to high triglycerides and very low HDL and LDL.  Triglycerides did improve from 233 to 192.  LDL good at 68.6.  HDL remains low at 26.  Strongly advised to stop smoking. Intolerance to Rosuvastatin due to acute itching.  Last checked in September 2021.     Obese  Body mass index is 39.18 kg/m².      ADHD   stable on current regimen Adderall XR 30 mg 2 capsules daily.    Patient is able to focus and complete tasks effectively.  No side effects reported.   Patient has intolerance to GENERIC Adderall XR in the past due to ineffectiveness and crash effect but has been taking the BRAND Adderall XR 30 mg 2 capsule daily since prior to 2017 without issue.     Current smoker   for over 30 years - 1ppd smoker.    States he started smoking around age 23.    He declines the smoking cessation program.    CT lung screen up to date 1/13/2022        Review of Systems   Constitutional: Negative for activity change, appetite change, fatigue, fever and unexpected weight change.   HENT: Negative for congestion, ear pain, mouth sores, nosebleeds, postnasal drip, rhinorrhea, sinus  pressure, sneezing, sore throat, trouble swallowing and voice change.    Eyes: Negative.    Respiratory: Negative for cough, chest tightness and shortness of breath.    Cardiovascular: Negative for chest pain, palpitations and leg swelling.   Gastrointestinal: Negative.  Negative for abdominal pain, blood in stool, constipation, diarrhea, nausea and vomiting.   Endocrine: Negative.    Genitourinary: Negative for difficulty urinating, dysuria, flank pain, hematuria and urgency.   Musculoskeletal: Negative for arthralgias, back pain, gait problem, joint swelling, myalgias and neck pain.   Skin: Negative for color change, rash and wound.   Allergic/Immunologic: Negative for immunocompromised state.   Neurological: Negative for dizziness, tremors, seizures, syncope, speech difficulty and headaches.   Hematological: Negative for adenopathy. Does not bruise/bleed easily.   Psychiatric/Behavioral: Negative for behavioral problems, dysphoric mood, sleep disturbance and suicidal ideas. The patient is not nervous/anxious.          Objective:     Vitals:    05/16/22 0926   BP: 126/72   BP Location: Right arm   Patient Position: Sitting   BP Method: Large (Manual)   Pulse: 89   Resp: 18   Temp: 99 °F (37.2 °C)   TempSrc: Temporal   SpO2: 98%   Weight: 119.3 kg (263 lb 0.1 oz)          Physical Exam  Constitutional:       General: He is not in acute distress.     Appearance: He is well-developed. He is obese. He is not ill-appearing, toxic-appearing or diaphoretic.      Comments: + obesity. Body mass index is 39.18 kg/m².       HENT:      Head: Normocephalic and atraumatic.      Right Ear: External ear normal.      Left Ear: External ear normal.   Eyes:      General: No scleral icterus.        Right eye: No discharge.         Left eye: No discharge.      Extraocular Movements: Extraocular movements intact.      Conjunctiva/sclera: Conjunctivae normal.      Pupils: Pupils are equal, round, and reactive to light.   Neck:       Thyroid: No thyromegaly.      Vascular: No JVD.      Trachea: No tracheal deviation.   Cardiovascular:      Rate and Rhythm: Normal rate and regular rhythm.      Heart sounds: Normal heart sounds. No murmur heard.  Pulmonary:      Effort: Pulmonary effort is normal. No respiratory distress.      Breath sounds: Normal breath sounds. No stridor. No wheezing or rhonchi.   Abdominal:      General: There is no distension.   Musculoskeletal:         General: Normal range of motion.      Cervical back: Normal range of motion and neck supple.   Lymphadenopathy:      Cervical: No cervical adenopathy.   Skin:     General: Skin is warm and dry.      Findings: No rash.   Neurological:      Mental Status: He is alert and oriented to person, place, and time.      Coordination: Coordination normal.   Psychiatric:         Mood and Affect: Mood normal.         Behavior: Behavior normal.         Thought Content: Thought content normal.         Judgment: Judgment normal.           Assessment:         ICD-10-CM ICD-9-CM   1. ADHD (attention deficit hyperactivity disorder), inattentive type  F90.0 314.00   2. Essential hypertension  I10 401.9   3. Mixed hyperlipidemia  E78.2 272.2   4. Tobacco user  Z72.0 305.1   5. Encounter for blood test for routine general physical examination  Z00.00 V72.62   6. Screening for deficiency anemia  Z13.0 V78.1   7. Thyroid disorder screen  Z13.29 V77.0   8. Diabetes mellitus screening  Z13.1 V77.1   9. Screening PSA (prostate specific antigen)  Z12.5 V76.44       Plan:       ADHD (attention deficit hyperactivity disorder), inattentive type  Continue current medication(s).  Follow up in 3 months.  -     ADDERALL XR 30 mg 24 hr capsule; Take 2 capsules (60 mg total) by mouth every morning. BRAND ONLY  Dispense: 180 capsule; Refill: 0    Essential hypertension  Continue current medication(s).  Follow up in 3 months.    Mixed hyperlipidemia  -     Lipid Panel; Future; Expected date: 05/16/2022    Tobacco  user    Encounter for blood test for routine general physical examination  -     CBC Auto Differential; Future; Expected date: 05/16/2022  -     Comprehensive Metabolic Panel; Future; Expected date: 05/16/2022  -     Hemoglobin A1C; Future; Expected date: 05/16/2022  -     Lipid Panel; Future; Expected date: 05/16/2022  -     TSH; Future; Expected date: 05/16/2022  -     PSA, Screening; Future; Expected date: 05/16/2022    Screening for deficiency anemia  -     CBC Auto Differential; Future; Expected date: 05/16/2022    Thyroid disorder screen  -     TSH; Future; Expected date: 05/16/2022    Diabetes mellitus screening  -     Comprehensive Metabolic Panel; Future; Expected date: 05/16/2022  -     Hemoglobin A1C; Future; Expected date: 05/16/2022    Screening PSA (prostate specific antigen)  -     PSA, Screening; Future; Expected date: 05/16/2022      Follow up in about 3 months (around 8/16/2022) for fasting labs and WELLNESS EXAM.     Patient's Medications   New Prescriptions    No medications on file   Previous Medications    AMLODIPINE (NORVASC) 10 MG TABLET    Take 1 tablet (10 mg total) by mouth once daily.    DICLOFENAC (VOLTAREN) 50 MG EC TABLET    Take 1 tablet (50 mg total) by mouth 2 (two) times daily.    DOXAZOSIN (CARDURA) 8 MG TAB    Take 1 tablet (8 mg total) by mouth once daily.    ESOMEPRAZOLE (NEXIUM) 40 MG CAPSULE    Take 1 capsule (40 mg total) by mouth once daily.    EZETIMIBE (ZETIA) 10 MG TABLET    Take 1 tablet (10 mg total) by mouth once daily.    FISH OIL-OMEGA-3 FATTY ACIDS 300-1,000 MG CAPSULE    Take 1 g by mouth once daily.    LISINOPRIL (PRINIVIL,ZESTRIL) 40 MG TABLET    Take 1 tablet (40 mg total) by mouth once daily.    NIACIN 500 MG TAB    Take 1 tablet (500 mg total) by mouth every evening.    TRIAMTERENE-HYDROCHLOROTHIAZIDE 37.5-25 MG (MAXZIDE-25) 37.5-25 MG PER TABLET    Take 1 tablet by mouth once daily.   Modified Medications    Modified Medication Previous Medication     ADDERALL XR 30 MG 24 HR CAPSULE ADDERALL XR 30 mg 24 hr capsule       Take 2 capsules (60 mg total) by mouth every morning. BRAND ONLY    Take 2 capsules (60 mg total) by mouth every morning. BRAND ONLY   Discontinued Medications    No medications on file       Past Medical History:   Diagnosis Date    Adult ADHD     Alcohol dependency 2015    last alcohol intake 2015    Colon polyp 2018    2 polyps - tubular adenoma - repeat colonoscopy in 5 years    GERD (gastroesophageal reflux disease)     Hyperlipidemia     Hypertension        Past Surgical History:   Procedure Laterality Date    COLONOSCOPY N/A 2018    Procedure: COLONOSCOPY;  Surgeon: Eduard Medley MD;  Location: [facility] ENDO;  Service: Endoscopy;  Laterality: N/A;    HERNIA REPAIR      INGUINAL HERNIA REPAIR Left     [facility]    KNEE SURGERY Right     VASECTOMY         Family History   Problem Relation Age of Onset    Hypertension Father     Arthritis Mother     Depression Mother     Drug abuse Mother     Hyperlipidemia Mother     Hypertension Mother     Mental illness Mother     Heart disease Brother         acute MI -  at at 46    Alcohol abuse Brother     Early death Brother     Mental illness Brother     Cancer Maternal Grandmother     No Known Problems Maternal Grandfather     Heart disease Paternal Grandmother     Alcohol abuse Paternal Grandfather        Social History     Socioeconomic History    Marital status:    Tobacco Use    Smoking status: Current Every Day Smoker     Packs/day: 1.00     Years: 33.00     Pack years: 33.00    Smokeless tobacco: Never Used   Substance and Sexual Activity    Alcohol use: No     Comment: every weekend - a couple beers per weekend night    Drug use: No    Sexual activity: Yes     Partners: Female     Social Determinants of Health     Financial Resource Strain: Low Risk     Difficulty of Paying Living Expenses: Not very hard   Food  Insecurity: No Food Insecurity    Worried About Running Out of Food in the Last Year: Never true    Ran Out of Food in the Last Year: Never true   Transportation Needs: No Transportation Needs    Lack of Transportation (Medical): No    Lack of Transportation (Non-Medical): No   Physical Activity: Insufficiently Active    Days of Exercise per Week: 3 days    Minutes of Exercise per Session: 40 min   Stress: No Stress Concern Present    Feeling of Stress : Only a little   Social Connections: Unknown    Frequency of Communication with Friends and Family: More than three times a week    Frequency of Social Gatherings with Friends and Family: More than three times a week    Active Member of Clubs or Organizations: No    Attends Club or Organization Meetings: Never    Marital Status:    Housing Stability: High Risk    Unable to Pay for Housing in the Last Year: Yes    Number of Places Lived in the Last Year: 1    Unstable Housing in the Last Year: No

## 2022-05-16 NOTE — TELEPHONE ENCOUNTER
Spoke with patient in regards of message- he schedule today for 4:30pm wanted to know if he can come in earlier due to seen in the area- I advised patient I will needed to ask NP Giulia to see if it ok- per NP Giulia said yes he can be seen early.

## 2022-05-17 ENCOUNTER — PATIENT MESSAGE (OUTPATIENT)
Dept: SMOKING CESSATION | Facility: CLINIC | Age: 57
End: 2022-05-17
Payer: COMMERCIAL

## 2022-05-23 ENCOUNTER — PATIENT MESSAGE (OUTPATIENT)
Dept: FAMILY MEDICINE | Facility: CLINIC | Age: 57
End: 2022-05-23
Payer: COMMERCIAL

## 2022-05-24 ENCOUNTER — PATIENT MESSAGE (OUTPATIENT)
Dept: FAMILY MEDICINE | Facility: CLINIC | Age: 57
End: 2022-05-24
Payer: COMMERCIAL

## 2022-05-24 DIAGNOSIS — I10 ESSENTIAL HYPERTENSION: ICD-10-CM

## 2022-05-24 RX ORDER — TRIAMTERENE/HYDROCHLOROTHIAZID 37.5-25 MG
1 TABLET ORAL DAILY
Qty: 30 TABLET | Refills: 0 | Status: SHIPPED | OUTPATIENT
Start: 2022-05-24 | End: 2022-06-22 | Stop reason: SDUPTHER

## 2022-05-27 ENCOUNTER — PATIENT MESSAGE (OUTPATIENT)
Dept: FAMILY MEDICINE | Facility: CLINIC | Age: 57
End: 2022-05-27
Payer: COMMERCIAL

## 2022-06-22 DIAGNOSIS — I10 ESSENTIAL HYPERTENSION: ICD-10-CM

## 2022-06-22 RX ORDER — TRIAMTERENE/HYDROCHLOROTHIAZID 37.5-25 MG
1 TABLET ORAL DAILY
Qty: 30 TABLET | Refills: 0 | Status: SHIPPED | OUTPATIENT
Start: 2022-06-22 | End: 2022-08-05 | Stop reason: SDUPTHER

## 2022-08-05 DIAGNOSIS — I10 ESSENTIAL HYPERTENSION: ICD-10-CM

## 2022-08-05 RX ORDER — TRIAMTERENE/HYDROCHLOROTHIAZID 37.5-25 MG
1 TABLET ORAL DAILY
Qty: 30 TABLET | Refills: 0 | Status: SHIPPED | OUTPATIENT
Start: 2022-08-05 | End: 2022-09-09 | Stop reason: SDUPTHER

## 2022-08-05 RX ORDER — ESOMEPRAZOLE MAGNESIUM 40 MG/1
40 CAPSULE, DELAYED RELEASE ORAL DAILY
Qty: 90 CAPSULE | Refills: 3 | Status: SHIPPED | OUTPATIENT
Start: 2022-08-05 | End: 2023-02-06 | Stop reason: SDUPTHER

## 2022-08-11 ENCOUNTER — OFFICE VISIT (OUTPATIENT)
Dept: FAMILY MEDICINE | Facility: CLINIC | Age: 57
End: 2022-08-11
Payer: COMMERCIAL

## 2022-08-11 VITALS
SYSTOLIC BLOOD PRESSURE: 126 MMHG | OXYGEN SATURATION: 98 % | WEIGHT: 256.75 LBS | HEART RATE: 72 BPM | HEIGHT: 69 IN | BODY MASS INDEX: 38.03 KG/M2 | TEMPERATURE: 98 F | DIASTOLIC BLOOD PRESSURE: 80 MMHG

## 2022-08-11 DIAGNOSIS — F90.0 ADHD (ATTENTION DEFICIT HYPERACTIVITY DISORDER), INATTENTIVE TYPE: ICD-10-CM

## 2022-08-11 DIAGNOSIS — E66.01 CLASS 2 SEVERE OBESITY DUE TO EXCESS CALORIES WITH SERIOUS COMORBIDITY AND BODY MASS INDEX (BMI) OF 37.0 TO 37.9 IN ADULT: ICD-10-CM

## 2022-08-11 DIAGNOSIS — Z72.0 TOBACCO USER: ICD-10-CM

## 2022-08-11 DIAGNOSIS — E78.2 MIXED HYPERLIPIDEMIA: ICD-10-CM

## 2022-08-11 DIAGNOSIS — I10 ESSENTIAL HYPERTENSION: ICD-10-CM

## 2022-08-11 DIAGNOSIS — Z00.00 ANNUAL PHYSICAL EXAM: Primary | ICD-10-CM

## 2022-08-11 PROCEDURE — 3079F PR MOST RECENT DIASTOLIC BLOOD PRESSURE 80-89 MM HG: ICD-10-PCS | Mod: CPTII,S$GLB,, | Performed by: NURSE PRACTITIONER

## 2022-08-11 PROCEDURE — 3044F HG A1C LEVEL LT 7.0%: CPT | Mod: CPTII,S$GLB,, | Performed by: NURSE PRACTITIONER

## 2022-08-11 PROCEDURE — 1159F PR MEDICATION LIST DOCUMENTED IN MEDICAL RECORD: ICD-10-PCS | Mod: CPTII,S$GLB,, | Performed by: NURSE PRACTITIONER

## 2022-08-11 PROCEDURE — 99396 PREV VISIT EST AGE 40-64: CPT | Mod: S$GLB,,, | Performed by: NURSE PRACTITIONER

## 2022-08-11 PROCEDURE — 1159F MED LIST DOCD IN RCRD: CPT | Mod: CPTII,S$GLB,, | Performed by: NURSE PRACTITIONER

## 2022-08-11 PROCEDURE — 99396 PR PREVENTIVE VISIT,EST,40-64: ICD-10-PCS | Mod: S$GLB,,, | Performed by: NURSE PRACTITIONER

## 2022-08-11 PROCEDURE — 4010F ACE/ARB THERAPY RXD/TAKEN: CPT | Mod: CPTII,S$GLB,, | Performed by: NURSE PRACTITIONER

## 2022-08-11 PROCEDURE — 1160F RVW MEDS BY RX/DR IN RCRD: CPT | Mod: CPTII,S$GLB,, | Performed by: NURSE PRACTITIONER

## 2022-08-11 PROCEDURE — 3079F DIAST BP 80-89 MM HG: CPT | Mod: CPTII,S$GLB,, | Performed by: NURSE PRACTITIONER

## 2022-08-11 PROCEDURE — 4010F PR ACE/ARB THEARPY RXD/TAKEN: ICD-10-PCS | Mod: CPTII,S$GLB,, | Performed by: NURSE PRACTITIONER

## 2022-08-11 PROCEDURE — 3044F PR MOST RECENT HEMOGLOBIN A1C LEVEL <7.0%: ICD-10-PCS | Mod: CPTII,S$GLB,, | Performed by: NURSE PRACTITIONER

## 2022-08-11 PROCEDURE — 3074F SYST BP LT 130 MM HG: CPT | Mod: CPTII,S$GLB,, | Performed by: NURSE PRACTITIONER

## 2022-08-11 PROCEDURE — 3008F PR BODY MASS INDEX (BMI) DOCUMENTED: ICD-10-PCS | Mod: CPTII,S$GLB,, | Performed by: NURSE PRACTITIONER

## 2022-08-11 PROCEDURE — 99999 PR PBB SHADOW E&M-EST. PATIENT-LVL IV: CPT | Mod: PBBFAC,,, | Performed by: NURSE PRACTITIONER

## 2022-08-11 PROCEDURE — 3008F BODY MASS INDEX DOCD: CPT | Mod: CPTII,S$GLB,, | Performed by: NURSE PRACTITIONER

## 2022-08-11 PROCEDURE — 99999 PR PBB SHADOW E&M-EST. PATIENT-LVL IV: ICD-10-PCS | Mod: PBBFAC,,, | Performed by: NURSE PRACTITIONER

## 2022-08-11 PROCEDURE — 3074F PR MOST RECENT SYSTOLIC BLOOD PRESSURE < 130 MM HG: ICD-10-PCS | Mod: CPTII,S$GLB,, | Performed by: NURSE PRACTITIONER

## 2022-08-11 PROCEDURE — 1160F PR REVIEW ALL MEDS BY PRESCRIBER/CLIN PHARMACIST DOCUMENTED: ICD-10-PCS | Mod: CPTII,S$GLB,, | Performed by: NURSE PRACTITIONER

## 2022-08-11 RX ORDER — DEXTROAMPHETAMINE SULFATE, DEXTROAMPHETAMINE SACCHARATE, AMPHETAMINE SULFATE AND AMPHETAMINE ASPARTATE 7.5; 7.5; 7.5; 7.5 MG/1; MG/1; MG/1; MG/1
60 CAPSULE, EXTENDED RELEASE ORAL EVERY MORNING
Qty: 180 CAPSULE | Refills: 0 | Status: SHIPPED | OUTPATIENT
Start: 2022-08-11 | End: 2022-11-08 | Stop reason: SDUPTHER

## 2022-08-11 NOTE — PROGRESS NOTES
"Subjective:       Patient ID: Nick Mcdonough Sr. is a 56 y.o. male.    Chief Complaint: Annual Exam    HPI    Patient is a 56-year-old white male with Hypertension, Hyperlipidemia, chronic GERD, ADHD, tobacco user, fluid retention to lower extremities with venous stasis dermatitis, and obesity that is here today for ANNUAL physical exam with fasting lab results.     Hypertension  Currently taking Lisinopril 40 mg daily, Maxzide 25 mg daily, Doxazosin 8 mg daily and Amlodipine 10 mg daily.   Blood pressure is well controlled on these medications with no fluid retention.  /80 (BP Location: Right arm, Patient Position: Sitting, BP Method: Large (Manual))   Pulse 72   Temp 97.9 °F (36.6 °C) (Temporal)   Ht 5' 9" (1.753 m)   Wt 116.4 kg (256 lb 11.6 oz)   SpO2 98%   BMI 37.91 kg/m²     Hyperlipidemia  Currently taking Zetia 10 mg daily in place of Atorvastatin 10 mg daily due to high triglycerides and low HDL   Triglycerides did improve from 233 now down to 94    HDL improved from 26 to 39  LDL 37.2  Intolerance to Rosuvastatin due to acute itching  Much improved on Zetia in place of statin.     Obese  Body mass index is 37.91 kg/m².        ADHD   stable on current regimen Adderall XR 30 mg 2 capsules daily.    Patient is able to focus and complete tasks effectively.  No side effects reported.   Patient has intolerance to GENERIC Adderall XR in the past due to ineffectiveness and crash effect but has been taking the BRAND Adderall XR 30 mg 2 capsule daily since prior to 2017 without issue.     Current smoker   for over 30 years - 1ppd smoker.    States he started smoking around age 21.    He declines the smoking cessation program.    CT lung screen up to date 1/13/2022    Wellness Labs:  CBC okay; mild macrocytosis - recommend B complex vitamin  CMP okay  Cholesterol discussed above    Component      Latest Ref Rng & Units 8/6/2022 9/18/2021 6/19/2021 10/3/2020   WBC      3.90 - 12.70 K/uL 8.36  7.23  "   RBC      4.60 - 6.20 M/uL 4.54 (L)  4.40 (L)    Hemoglobin      14.0 - 18.0 g/dL 14.6  14.3    Hematocrit      40.0 - 54.0 % 42.8  41.8    MCV      82 - 98 fL 94  95    MCH      27.0 - 31.0 pg 32.2 (H)  32.5 (H)    MCHC      32.0 - 36.0 g/dL 34.1  34.2    RDW      11.5 - 14.5 % 14.6 (H)  14.0    Platelets      150 - 450 K/uL 233  273    MPV      9.2 - 12.9 fL 11.1  11.5    Immature Granulocytes      0.0 - 0.5 % 0.6 (H)  0.7 (H)    Gran # (ANC)      1.8 - 7.7 K/uL 4.9  4.1    Immature Grans (Abs)      0.00 - 0.04 K/uL 0.05 (H)  0.05 (H)    Lymph #      1.0 - 4.8 K/uL 2.2  2.0    Mono #      0.3 - 1.0 K/uL 0.9  0.7    Eos #      0.0 - 0.5 K/uL 0.3  0.4    Baso #      0.00 - 0.20 K/uL 0.06  0.07    nRBC      0 /100 WBC 0  0    Gran %      38.0 - 73.0 % 58.0  56.1    Lymph %      18.0 - 48.0 % 26.6  27.2    Mono %      4.0 - 15.0 % 10.2  9.5    Eosinophil %      0.0 - 8.0 % 3.9  5.5    Basophil %      0.0 - 1.9 % 0.7  1.0    Differential Method       Automated  Automated    Sodium      136 - 145 mmol/L 141 138 142 144   Potassium      3.5 - 5.1 mmol/L 3.9 3.6 3.7 3.8   Chloride      95 - 110 mmol/L 99 107 105 107   CO2      23 - 29 mmol/L 30 (H) 26 26 29   Glucose      70 - 110 mg/dL 93 108 106 104   BUN      2 - 20 mg/dL 18 34 (H) 23 (H) 21 (H)   Creatinine      0.50 - 1.40 mg/dL 0.91 0.82 0.95 1.05   Calcium      8.7 - 10.5 mg/dL 9.4 9.0 9.1 9.5   PROTEIN TOTAL      6.0 - 8.4 g/dL 7.4 6.8 7.1 7.4   Albumin      3.5 - 5.2 g/dL 4.7 4.2 4.3 4.5   BILIRUBIN TOTAL      0.1 - 1.0 mg/dL 1.1 (H) 0.3 0.4 0.9   Alkaline Phosphatase      38 - 126 U/L 75 95 88 86   AST      15 - 46 U/L 41 41 33 33   ALT      10 - 44 U/L 38 39 29 35   Anion Gap      8 - 16 mmol/L 12 5 (L) 11 8   eGFR      >60 mL/min/1.73 m:2 >60.0      Cholesterol      120 - 199 mg/dL 95 (L) 133 111 (L) 115 (L)   Triglycerides      30 - 150 mg/dL 94 192 (H) 233 (H) 86   HDL      40 - 75 mg/dL 39 (L) 26 (L) 25 (L) 32 (L)   LDL Cholesterol External      63.0 -  159.0 mg/dL 37.2 (L) 68.6 39.4 (L) 65.8   HDL/Cholesterol Ratio      20.0 - 50.0 % 41.1 19.5 (L) 22.5 27.8   Total Cholesterol/HDL Ratio      2.0 - 5.0 2.4 5.1 (H) 4.4 3.6   Non-HDL Cholesterol      mg/dL 56 107 86 83   Hemoglobin A1C External      4.0 - 5.6 % 5.4      Estimated Avg Glucose      68 - 131 mg/dL 108      TSH      0.400 - 4.000 uIU/mL 2.860  2.530    PSA, Screen      0.00 - 4.00 ng/mL 1.0  0.79        Health Maintenance:  Health Maintenance Summary     Full History      Expand All  Collapse All    Overdue - Shingles Vaccine  (1 of 2)  Overdue - never done  No completion history exists for this topic.     Overdue - COVID-19 Vaccine  (4 - Booster for Pfizer series)  Overdue since 3/5/2022  12/05/2021  Imm Admin: COVID-19, MRNA, LN-S, PF (Pfizer) (Purple Cap)    04/09/2021  Imm Admin: COVID-19, MRNA, LN-S, PF (Pfizer) (Purple Cap)    03/19/2021  Imm Admin: COVID-19, MRNA, LN-S, PF (Pfizer) (Purple Cap)      Postponed - Pneumococcal Vaccines (Age 0-64)  (2 - PCV)  Postponed until 10/3/2022  03/12/2018  Imm Admin: Pneumococcal Polysaccharide - 23 Valent      Influenza Vaccine  (1)  Next due on 9/1/2022 12/30/2021  Imm Admin: Influenza - Quadrivalent - PF *Preferred* (6 months and older)    10/13/2020  Imm Admin: Influenza - Quadrivalent - PF *Preferred* (6 months and older)    10/02/2019  Imm Admin: Influenza - Quadrivalent - PF *Preferred* (6 months and older)    09/19/2018  Imm Admin: Influenza - Quadrivalent - PF *Preferred* (6 months and older)    09/22/2017  Imm Admin: Influenza - Quadrivalent - PF *Preferred* (6 months and older)    View More History     LDCT Lung Screen  (Yearly)  Next due on 1/13/2023 01/13/2022  CT Chest Lung Screening Low Dose      Colorectal Cancer Screening  (Colonoscopy - Every 5 Years)  Next due on 2/2/2023 02/02/2018  Colonoscopy      PROSTATE-SPECIFIC ANTIGEN  (Yearly)  Next due on 8/6/2023 08/06/2022  PSA, Screening    06/19/2021  PSA, Screening    06/20/2020  PSA,  Screening    03/09/2019  PSA, Screening    03/12/2018  PSA, Screening    View More History     TETANUS VACCINE  (Every 10 Years)  Next due on 7/30/2027 07/30/2017  Imm Admin: Tdap    09/22/2005  Imm Admin: Td (ADULT)      Lipid Panel  (Every 5 Years)  Next due on 8/6/2027 08/06/2022  Lipid Panel    09/18/2021  Lipid Panel    06/19/2021  Lipid Panel    10/03/2020  Lipid Panel    06/20/2020  Lipid panel    View More History     HIV Screening  Completed  06/20/2020  HIV 1/2 Ag/Ab (4th Gen)      Hepatitis C Screening  Completed  06/19/2021  Hepatitis C Ab component of Hepatitis C Antibody         Review of Systems   Constitutional: Negative for activity change, appetite change, fatigue, fever and unexpected weight change.   HENT: Negative for congestion, ear pain, mouth sores, nosebleeds, postnasal drip, rhinorrhea, sinus pressure, sneezing, sore throat, trouble swallowing and voice change.    Eyes: Negative.    Respiratory: Negative for cough, chest tightness and shortness of breath.    Cardiovascular: Negative for chest pain, palpitations and leg swelling.   Gastrointestinal: Negative.  Negative for abdominal pain, blood in stool, constipation, diarrhea, nausea and vomiting.   Endocrine: Negative.    Genitourinary: Negative for difficulty urinating, dysuria, flank pain, hematuria and urgency.   Musculoskeletal: Negative for arthralgias, back pain, gait problem, joint swelling, myalgias and neck pain.   Skin: Negative for color change, rash and wound.   Allergic/Immunologic: Negative for immunocompromised state.   Neurological: Negative for dizziness, tremors, seizures, syncope, speech difficulty and headaches.   Hematological: Negative for adenopathy. Does not bruise/bleed easily.   Psychiatric/Behavioral: Negative for behavioral problems, dysphoric mood, sleep disturbance and suicidal ideas. The patient is not nervous/anxious.          Objective:     Vitals:    08/11/22 1630   BP: 126/80   BP Location: Right arm  "  Patient Position: Sitting   BP Method: Large (Manual)   Pulse: 72   Temp: 97.9 °F (36.6 °C)   TempSrc: Temporal   SpO2: 98%   Weight: 116.4 kg (256 lb 11.6 oz)   Height: 5' 9" (1.753 m)          Physical Exam  Constitutional:       General: He is not in acute distress.     Appearance: He is well-developed. He is obese. He is not ill-appearing, toxic-appearing or diaphoretic.      Comments: + obesity. Body mass index is 37.91 kg/m².         HENT:      Head: Normocephalic and atraumatic.      Right Ear: External ear normal.      Left Ear: External ear normal.   Eyes:      General: No scleral icterus.        Right eye: No discharge.         Left eye: No discharge.      Extraocular Movements: Extraocular movements intact.      Conjunctiva/sclera: Conjunctivae normal.      Pupils: Pupils are equal, round, and reactive to light.   Neck:      Thyroid: No thyromegaly.      Vascular: No JVD.      Trachea: No tracheal deviation.   Cardiovascular:      Rate and Rhythm: Normal rate and regular rhythm.      Heart sounds: Normal heart sounds. No murmur heard.  Pulmonary:      Effort: Pulmonary effort is normal. No respiratory distress.      Breath sounds: Normal breath sounds. No stridor. No wheezing or rhonchi.   Abdominal:      General: There is no distension.   Musculoskeletal:         General: Normal range of motion.      Cervical back: Normal range of motion and neck supple.   Lymphadenopathy:      Cervical: No cervical adenopathy.   Skin:     General: Skin is warm and dry.      Findings: No rash.   Neurological:      Mental Status: He is alert and oriented to person, place, and time.      Coordination: Coordination normal.   Psychiatric:         Mood and Affect: Mood normal.         Behavior: Behavior normal.         Thought Content: Thought content normal.         Judgment: Judgment normal.           Assessment:         ICD-10-CM ICD-9-CM   1. Annual physical exam  Z00.00 V70.0   2. Essential hypertension  I10 401.9   3. " Mixed hyperlipidemia  E78.2 272.2   4. ADHD (attention deficit hyperactivity disorder), inattentive type  F90.0 314.00   5. Tobacco user  Z72.0 305.1   6. Class 2 severe obesity due to excess calories with serious comorbidity and body mass index (BMI) of 37.0 to 37.9 in adult  E66.01 278.01    Z68.37 V85.37       Plan:       Annual physical exam  See graph above    Essential hypertension  Continue current medication(s).  Follow up in 6 months.    Mixed hyperlipidemia  Continue current medication(s).  Follow up in 12 months.    ADHD (attention deficit hyperactivity disorder), inattentive type  Continue current medication(s).  Follow up in 3 months.  -     ADDERALL XR 30 mg 24 hr capsule; Take 2 capsules (60 mg total) by mouth every morning. BRAND ONLY  Dispense: 180 capsule; Refill: 0    Tobacco user  Declined smoking cessation    Class 2 severe obesity due to excess calories with serious comorbidity and body mass index (BMI) of 37.0 to 37.9 in adult  Work on diet and weight loss.      Follow up in about 3 months (around 11/11/2022) for ADHD follow up.     Patient's Medications   New Prescriptions    No medications on file   Previous Medications    AMLODIPINE (NORVASC) 10 MG TABLET    Take 1 tablet (10 mg total) by mouth once daily.    DICLOFENAC (VOLTAREN) 50 MG EC TABLET    Take 1 tablet (50 mg total) by mouth 2 (two) times daily.    DOXAZOSIN (CARDURA) 8 MG TAB    Take 1 tablet (8 mg total) by mouth once daily.    ESOMEPRAZOLE (NEXIUM) 40 MG CAPSULE    Take 1 capsule (40 mg total) by mouth once daily.    EZETIMIBE (ZETIA) 10 MG TABLET    Take 1 tablet (10 mg total) by mouth once daily.    FISH OIL-OMEGA-3 FATTY ACIDS 300-1,000 MG CAPSULE    Take 1 g by mouth once daily.    LISINOPRIL (PRINIVIL,ZESTRIL) 40 MG TABLET    Take 1 tablet (40 mg total) by mouth once daily.    NIACIN 500 MG TAB    Take 1 tablet (500 mg total) by mouth every evening.    TRIAMTERENE-HYDROCHLOROTHIAZIDE 37.5-25 MG (MAXZIDE-25) 37.5-25 MG PER  TABLET    Take 1 tablet by mouth once daily.   Modified Medications    Modified Medication Previous Medication    ADDERALL XR 30 MG 24 HR CAPSULE ADDERALL XR 30 mg 24 hr capsule       Take 2 capsules (60 mg total) by mouth every morning. BRAND ONLY    Take 2 capsules (60 mg total) by mouth every morning. BRAND ONLY   Discontinued Medications    No medications on file       Past Medical History:   Diagnosis Date    Adult ADHD     Alcohol dependency 2015    last alcohol intake 2015    Colon polyp 2018    2 polyps - tubular adenoma - repeat colonoscopy in 5 years    GERD (gastroesophageal reflux disease)     Hyperlipidemia     Hypertension        Past Surgical History:   Procedure Laterality Date    COLONOSCOPY N/A 2018    Procedure: COLONOSCOPY;  Surgeon: Eduard Medley MD;  Location: [facility];  Service: Endoscopy;  Laterality: N/A;    HERNIA REPAIR      INGUINAL HERNIA REPAIR Left     [facility]    KNEE SURGERY Right     VASECTOMY         Family History   Problem Relation Age of Onset    Hypertension Father     Arthritis Mother     Depression Mother     Drug abuse Mother     Hyperlipidemia Mother     Hypertension Mother     Mental illness Mother     Heart disease Brother         acute MI -  at at 46    Alcohol abuse Brother     Early death Brother     Mental illness Brother     Cancer Maternal Grandmother     No Known Problems Maternal Grandfather     Heart disease Paternal Grandmother     Alcohol abuse Paternal Grandfather        Social History     Socioeconomic History    Marital status:    Occupational History    Occupation:    Tobacco Use    Smoking status: Current Every Day Smoker     Packs/day: 1.00     Years: 35.00     Pack years: 35.00    Smokeless tobacco: Never Used   Substance and Sexual Activity    Alcohol use: Yes     Alcohol/week: 8.0 standard drinks     Types: 8 Cans of beer per week     Comment: every weekend  - a couple beers per weekend night    Drug use: No    Sexual activity: Yes     Partners: Female     Social Determinants of Health     Financial Resource Strain: Low Risk     Difficulty of Paying Living Expenses: Not very hard   Food Insecurity: No Food Insecurity    Worried About Running Out of Food in the Last Year: Never true    Ran Out of Food in the Last Year: Never true   Transportation Needs: No Transportation Needs    Lack of Transportation (Medical): No    Lack of Transportation (Non-Medical): No   Physical Activity: Insufficiently Active    Days of Exercise per Week: 3 days    Minutes of Exercise per Session: 40 min   Stress: No Stress Concern Present    Feeling of Stress : Only a little   Social Connections: Unknown    Frequency of Communication with Friends and Family: More than three times a week    Frequency of Social Gatherings with Friends and Family: More than three times a week    Active Member of Clubs or Organizations: No    Attends Club or Organization Meetings: Never    Marital Status:    Housing Stability: High Risk    Unable to Pay for Housing in the Last Year: Yes    Number of Places Lived in the Last Year: 1    Unstable Housing in the Last Year: No

## 2022-09-09 DIAGNOSIS — I10 ESSENTIAL HYPERTENSION: ICD-10-CM

## 2022-09-09 DIAGNOSIS — E78.2 MIXED HYPERLIPIDEMIA: ICD-10-CM

## 2022-09-09 RX ORDER — AMLODIPINE BESYLATE 10 MG/1
10 TABLET ORAL DAILY
Qty: 90 TABLET | Refills: 0 | Status: SHIPPED | OUTPATIENT
Start: 2022-09-09 | End: 2022-11-11 | Stop reason: SDUPTHER

## 2022-09-09 RX ORDER — TRIAMTERENE/HYDROCHLOROTHIAZID 37.5-25 MG
1 TABLET ORAL DAILY
Qty: 30 TABLET | Refills: 0 | Status: SHIPPED | OUTPATIENT
Start: 2022-09-09 | End: 2022-10-10 | Stop reason: SDUPTHER

## 2022-09-09 RX ORDER — TRIAMTERENE/HYDROCHLOROTHIAZID 37.5-25 MG
1 TABLET ORAL DAILY
Qty: 30 TABLET | Refills: 0 | OUTPATIENT
Start: 2022-09-09 | End: 2023-09-09

## 2022-09-09 RX ORDER — EZETIMIBE 10 MG/1
10 TABLET ORAL DAILY
Qty: 90 TABLET | Refills: 0 | Status: SHIPPED | OUTPATIENT
Start: 2022-09-09 | End: 2022-11-11 | Stop reason: SDUPTHER

## 2022-09-09 RX ORDER — LISINOPRIL 40 MG/1
40 TABLET ORAL DAILY
Qty: 90 TABLET | Refills: 0 | Status: SHIPPED | OUTPATIENT
Start: 2022-09-09 | End: 2022-11-11 | Stop reason: SDUPTHER

## 2022-10-10 DIAGNOSIS — I10 ESSENTIAL HYPERTENSION: ICD-10-CM

## 2022-10-11 RX ORDER — TRIAMTERENE/HYDROCHLOROTHIAZID 37.5-25 MG
1 TABLET ORAL DAILY
Qty: 30 TABLET | Refills: 0 | Status: SHIPPED | OUTPATIENT
Start: 2022-10-11 | End: 2022-10-14 | Stop reason: SDUPTHER

## 2022-10-13 DIAGNOSIS — I10 ESSENTIAL HYPERTENSION: ICD-10-CM

## 2022-10-14 RX ORDER — TRIAMTERENE/HYDROCHLOROTHIAZID 37.5-25 MG
1 TABLET ORAL DAILY
Qty: 30 TABLET | Refills: 0 | OUTPATIENT
Start: 2022-10-14 | End: 2023-10-14

## 2022-10-14 RX ORDER — TRIAMTERENE/HYDROCHLOROTHIAZID 37.5-25 MG
1 TABLET ORAL DAILY
Qty: 90 TABLET | Refills: 0 | Status: SHIPPED | OUTPATIENT
Start: 2022-10-14 | End: 2022-11-11 | Stop reason: SDUPTHER

## 2022-11-08 ENCOUNTER — OFFICE VISIT (OUTPATIENT)
Dept: FAMILY MEDICINE | Facility: CLINIC | Age: 57
End: 2022-11-08
Payer: COMMERCIAL

## 2022-11-08 VITALS
SYSTOLIC BLOOD PRESSURE: 124 MMHG | WEIGHT: 259.94 LBS | HEART RATE: 71 BPM | HEIGHT: 69 IN | OXYGEN SATURATION: 97 % | DIASTOLIC BLOOD PRESSURE: 78 MMHG | TEMPERATURE: 99 F | BODY MASS INDEX: 38.5 KG/M2

## 2022-11-08 DIAGNOSIS — Z23 FLU VACCINE NEED: ICD-10-CM

## 2022-11-08 DIAGNOSIS — Z72.0 TOBACCO USER: ICD-10-CM

## 2022-11-08 DIAGNOSIS — Z23 NEED FOR STREPTOCOCCUS PNEUMONIAE VACCINATION: ICD-10-CM

## 2022-11-08 DIAGNOSIS — F90.0 ADHD (ATTENTION DEFICIT HYPERACTIVITY DISORDER), INATTENTIVE TYPE: ICD-10-CM

## 2022-11-08 DIAGNOSIS — I10 ESSENTIAL HYPERTENSION: Primary | ICD-10-CM

## 2022-11-08 PROCEDURE — 3074F SYST BP LT 130 MM HG: CPT | Mod: CPTII,S$GLB,, | Performed by: NURSE PRACTITIONER

## 2022-11-08 PROCEDURE — 90686 FLU VACCINE (QUAD) GREATER THAN OR EQUAL TO 3YO PRESERVATIVE FREE IM: ICD-10-PCS | Mod: S$GLB,,, | Performed by: NURSE PRACTITIONER

## 2022-11-08 PROCEDURE — 99999 PR PBB SHADOW E&M-EST. PATIENT-LVL IV: ICD-10-PCS | Mod: PBBFAC,,, | Performed by: NURSE PRACTITIONER

## 2022-11-08 PROCEDURE — 1160F RVW MEDS BY RX/DR IN RCRD: CPT | Mod: CPTII,S$GLB,, | Performed by: NURSE PRACTITIONER

## 2022-11-08 PROCEDURE — 1159F MED LIST DOCD IN RCRD: CPT | Mod: CPTII,S$GLB,, | Performed by: NURSE PRACTITIONER

## 2022-11-08 PROCEDURE — 90471 FLU VACCINE (QUAD) GREATER THAN OR EQUAL TO 3YO PRESERVATIVE FREE IM: ICD-10-PCS | Mod: S$GLB,,, | Performed by: NURSE PRACTITIONER

## 2022-11-08 PROCEDURE — 99999 PR PBB SHADOW E&M-EST. PATIENT-LVL IV: CPT | Mod: PBBFAC,,, | Performed by: NURSE PRACTITIONER

## 2022-11-08 PROCEDURE — 3074F PR MOST RECENT SYSTOLIC BLOOD PRESSURE < 130 MM HG: ICD-10-PCS | Mod: CPTII,S$GLB,, | Performed by: NURSE PRACTITIONER

## 2022-11-08 PROCEDURE — 1160F PR REVIEW ALL MEDS BY PRESCRIBER/CLIN PHARMACIST DOCUMENTED: ICD-10-PCS | Mod: CPTII,S$GLB,, | Performed by: NURSE PRACTITIONER

## 2022-11-08 PROCEDURE — 99214 OFFICE O/P EST MOD 30 MIN: CPT | Mod: 25,S$GLB,, | Performed by: NURSE PRACTITIONER

## 2022-11-08 PROCEDURE — 3044F PR MOST RECENT HEMOGLOBIN A1C LEVEL <7.0%: ICD-10-PCS | Mod: CPTII,S$GLB,, | Performed by: NURSE PRACTITIONER

## 2022-11-08 PROCEDURE — 90677 PCV20 VACCINE IM: CPT | Mod: S$GLB,,, | Performed by: NURSE PRACTITIONER

## 2022-11-08 PROCEDURE — 90472 IMMUNIZATION ADMIN EACH ADD: CPT | Mod: S$GLB,,, | Performed by: NURSE PRACTITIONER

## 2022-11-08 PROCEDURE — 4010F PR ACE/ARB THEARPY RXD/TAKEN: ICD-10-PCS | Mod: CPTII,S$GLB,, | Performed by: NURSE PRACTITIONER

## 2022-11-08 PROCEDURE — 3008F BODY MASS INDEX DOCD: CPT | Mod: CPTII,S$GLB,, | Performed by: NURSE PRACTITIONER

## 2022-11-08 PROCEDURE — 99214 PR OFFICE/OUTPT VISIT, EST, LEVL IV, 30-39 MIN: ICD-10-PCS | Mod: 25,S$GLB,, | Performed by: NURSE PRACTITIONER

## 2022-11-08 PROCEDURE — 3044F HG A1C LEVEL LT 7.0%: CPT | Mod: CPTII,S$GLB,, | Performed by: NURSE PRACTITIONER

## 2022-11-08 PROCEDURE — 90471 IMMUNIZATION ADMIN: CPT | Mod: S$GLB,,, | Performed by: NURSE PRACTITIONER

## 2022-11-08 PROCEDURE — 3008F PR BODY MASS INDEX (BMI) DOCUMENTED: ICD-10-PCS | Mod: CPTII,S$GLB,, | Performed by: NURSE PRACTITIONER

## 2022-11-08 PROCEDURE — 90677 PNEUMOCOCCAL CONJUGATE VACCINE 20-VALENT: ICD-10-PCS | Mod: S$GLB,,, | Performed by: NURSE PRACTITIONER

## 2022-11-08 PROCEDURE — 90686 IIV4 VACC NO PRSV 0.5 ML IM: CPT | Mod: S$GLB,,, | Performed by: NURSE PRACTITIONER

## 2022-11-08 PROCEDURE — 90472 PNEUMOCOCCAL CONJUGATE VACCINE 20-VALENT: ICD-10-PCS | Mod: S$GLB,,, | Performed by: NURSE PRACTITIONER

## 2022-11-08 PROCEDURE — 4010F ACE/ARB THERAPY RXD/TAKEN: CPT | Mod: CPTII,S$GLB,, | Performed by: NURSE PRACTITIONER

## 2022-11-08 PROCEDURE — 3078F DIAST BP <80 MM HG: CPT | Mod: CPTII,S$GLB,, | Performed by: NURSE PRACTITIONER

## 2022-11-08 PROCEDURE — 1159F PR MEDICATION LIST DOCUMENTED IN MEDICAL RECORD: ICD-10-PCS | Mod: CPTII,S$GLB,, | Performed by: NURSE PRACTITIONER

## 2022-11-08 PROCEDURE — 3078F PR MOST RECENT DIASTOLIC BLOOD PRESSURE < 80 MM HG: ICD-10-PCS | Mod: CPTII,S$GLB,, | Performed by: NURSE PRACTITIONER

## 2022-11-08 RX ORDER — DEXTROAMPHETAMINE SULFATE, DEXTROAMPHETAMINE SACCHARATE, AMPHETAMINE SULFATE AND AMPHETAMINE ASPARTATE 7.5; 7.5; 7.5; 7.5 MG/1; MG/1; MG/1; MG/1
60 CAPSULE, EXTENDED RELEASE ORAL EVERY MORNING
Qty: 180 CAPSULE | Refills: 0 | Status: SHIPPED | OUTPATIENT
Start: 2022-11-08 | End: 2023-02-06 | Stop reason: SDUPTHER

## 2022-11-08 NOTE — PROGRESS NOTES
"Subjective:       Patient ID: Nick Mcdonough Sr. is a 57 y.o. male.    Chief Complaint: ADHD (F/U Med Check)    HPI    Patient is a 57-year-old white male with Hypertension, Hyperlipidemia, chronic GERD, ADHD, tobacco user, fluid retention to lower extremities with venous stasis dermatitis, and obesity that is here today for 3 month follow up. Last wellness exam in August 2022.     Hypertension  Currently taking Lisinopril 40 mg daily, Maxzide 25 mg daily, Doxazosin 8 mg daily and Amlodipine 10 mg daily.   Blood pressure is well controlled on these medications with no fluid retention.  /78 (BP Location: Left arm, Patient Position: Sitting, BP Method: Large (Manual))   Pulse 71   Temp 98.7 °F (37.1 °C) (Temporal)   Ht 5' 9" (1.753 m)   Wt 117.9 kg (259 lb 14.8 oz)   SpO2 97%   BMI 38.38 kg/m²        Hyperlipidemia  Currently taking Zetia 10 mg daily in place of Atorvastatin 10 mg daily due to high triglycerides and low HDL   Triglycerides did improve from 233 now down to 94    HDL improved from 26 to 39  LDL 37.2  Intolerance to Rosuvastatin due to acute itching  Much improved on Zetia in place of statin.      Obese  Body mass index is 38.38 kg/m².           ADHD   stable on current regimen Adderall XR 30 mg 2 capsules daily.    Patient is able to focus and complete tasks effectively.  No side effects reported.   Patient has intolerance to GENERIC Adderall XR in the past due to ineffectiveness and crash effect but has been taking the BRAND Adderall XR 30 mg 2 capsule daily since prior to 2017 without issue.     Current smoker   for over 30 years - 1ppd smoker.    States he started smoking around age 21.    He declines the smoking cessation program.    CT lung screen up to date 1/13/2022    Health Maintenance:  Agreed to flu and pneumonia vaccines today    Review of Systems   Constitutional:  Negative for activity change, appetite change, fatigue, fever and unexpected weight change.   HENT:  Negative " "for congestion, ear pain, mouth sores, nosebleeds, postnasal drip, rhinorrhea, sinus pressure, sneezing, sore throat, trouble swallowing and voice change.    Eyes: Negative.    Respiratory:  Negative for cough, chest tightness and shortness of breath.    Cardiovascular:  Negative for chest pain, palpitations and leg swelling.   Gastrointestinal: Negative.  Negative for abdominal pain, blood in stool, constipation, diarrhea, nausea and vomiting.   Endocrine: Negative.    Genitourinary:  Negative for difficulty urinating, dysuria, flank pain, hematuria and urgency.   Musculoskeletal:  Negative for arthralgias, back pain, gait problem, joint swelling, myalgias and neck pain.   Skin:  Negative for color change, rash and wound.   Allergic/Immunologic: Negative for immunocompromised state.   Neurological:  Negative for dizziness, tremors, seizures, syncope, speech difficulty and headaches.   Hematological:  Negative for adenopathy. Does not bruise/bleed easily.   Psychiatric/Behavioral:  Negative for behavioral problems, dysphoric mood, sleep disturbance and suicidal ideas. The patient is not nervous/anxious.        Objective:     Vitals:    11/08/22 1543   BP: 124/78   BP Location: Left arm   Patient Position: Sitting   BP Method: Large (Manual)   Pulse: 71   Temp: 98.7 °F (37.1 °C)   TempSrc: Temporal   SpO2: 97%   Weight: 117.9 kg (259 lb 14.8 oz)   Height: 5' 9" (1.753 m)          Physical Exam  Constitutional:       General: He is not in acute distress.     Appearance: He is well-developed. He is obese. He is not ill-appearing, toxic-appearing or diaphoretic.      Comments: + obesity. Body mass index is 38.38 kg/m².             HENT:      Head: Normocephalic and atraumatic.      Right Ear: External ear normal.      Left Ear: External ear normal.   Eyes:      General: No scleral icterus.        Right eye: No discharge.         Left eye: No discharge.      Extraocular Movements: Extraocular movements intact.      " Conjunctiva/sclera: Conjunctivae normal.      Pupils: Pupils are equal, round, and reactive to light.   Neck:      Thyroid: No thyromegaly.      Vascular: No JVD.      Trachea: No tracheal deviation.   Cardiovascular:      Rate and Rhythm: Normal rate and regular rhythm.      Heart sounds: Normal heart sounds. No murmur heard.  Pulmonary:      Effort: Pulmonary effort is normal. No respiratory distress.      Breath sounds: Normal breath sounds. No stridor. No wheezing or rhonchi.   Abdominal:      General: There is no distension.   Musculoskeletal:         General: Normal range of motion.      Cervical back: Normal range of motion and neck supple.   Lymphadenopathy:      Cervical: No cervical adenopathy.   Skin:     General: Skin is warm and dry.      Findings: No rash.   Neurological:      Mental Status: He is alert and oriented to person, place, and time.      Coordination: Coordination normal.   Psychiatric:         Mood and Affect: Mood normal.         Behavior: Behavior normal.         Thought Content: Thought content normal.         Judgment: Judgment normal.         Assessment:         ICD-10-CM ICD-9-CM   1. Essential hypertension  I10 401.9   2. ADHD (attention deficit hyperactivity disorder), inattentive type  F90.0 314.00   3. Flu vaccine need  Z23 V04.81   4. Need for Streptococcus pneumoniae vaccination  Z23 V03.82   5. Tobacco user  Z72.0 305.1       Plan:       Essential hypertension  Continue current medication(s).  Follow up in 3 months.      ADHD (attention deficit hyperactivity disorder), inattentive type  Continue current medication(s).  Follow up in 3 months.    -     ADDERALL XR 30 mg 24 hr capsule; Take 2 capsules (60 mg total) by mouth every morning. BRAND ONLY  Dispense: 180 capsule; Refill: 0    Flu vaccine need  -     Influenza - Quadrivalent *Preferred* (6 months+) (PF)    Need for Streptococcus pneumoniae vaccination  -     (In Office Administered) Pneumococcal Conjugate Vaccine (20  Valent) (IM)    Tobacco user  -     (In Office Administered) Pneumococcal Conjugate Vaccine (20 Valent) (IM)    Follow up in about 3 months (around 2/6/2023) for ADHD follow up.     Patient's Medications   New Prescriptions    No medications on file   Previous Medications    AMLODIPINE (NORVASC) 10 MG TABLET    Take 1 tablet (10 mg total) by mouth once daily.    DICLOFENAC (VOLTAREN) 50 MG EC TABLET    Take 1 tablet (50 mg total) by mouth 2 (two) times daily.    DOXAZOSIN (CARDURA) 8 MG TAB    Take 1 tablet (8 mg total) by mouth once daily.    ESOMEPRAZOLE (NEXIUM) 40 MG CAPSULE    Take 1 capsule (40 mg total) by mouth once daily.    EZETIMIBE (ZETIA) 10 MG TABLET    Take 1 tablet (10 mg total) by mouth once daily.    FISH OIL-OMEGA-3 FATTY ACIDS 300-1,000 MG CAPSULE    Take 1 g by mouth once daily.    LISINOPRIL (PRINIVIL,ZESTRIL) 40 MG TABLET    Take 1 tablet (40 mg total) by mouth once daily.    NIACIN 500 MG TAB    Take 1 tablet (500 mg total) by mouth every evening.    TRIAMTERENE-HYDROCHLOROTHIAZIDE 37.5-25 MG (MAXZIDE-25) 37.5-25 MG PER TABLET    Take 1 tablet by mouth once daily.   Modified Medications    Modified Medication Previous Medication    ADDERALL XR 30 MG 24 HR CAPSULE ADDERALL XR 30 mg 24 hr capsule       Take 2 capsules (60 mg total) by mouth every morning. BRAND ONLY    Take 2 capsules (60 mg total) by mouth every morning. BRAND ONLY   Discontinued Medications    No medications on file       Past Medical History:   Diagnosis Date    Adult ADHD     Alcohol dependency 9/2015    last alcohol intake September 2015    Colon polyp 02/02/2018    2 polyps - tubular adenoma - repeat colonoscopy in 5 years    GERD (gastroesophageal reflux disease)     Hyperlipidemia     Hypertension        Past Surgical History:   Procedure Laterality Date    COLONOSCOPY N/A 2/2/2018    Procedure: COLONOSCOPY;  Surgeon: Eduard Medley MD;  Location: [facility];  Service: Endoscopy;  Laterality: N/A;    HERNIA REPAIR       INGUINAL HERNIA REPAIR Left 2012    done at [facility]    KNEE SURGERY Right     VASECTOMY         Family History   Problem Relation Age of Onset    Hypertension Father     Arthritis Mother     Depression Mother     Drug abuse Mother     Hyperlipidemia Mother     Hypertension Mother     Mental illness Mother     Heart disease Brother         acute MI -  at at 46    Alcohol abuse Brother     Early death Brother     Mental illness Brother     Cancer Maternal Grandmother     No Known Problems Maternal Grandfather     Heart disease Paternal Grandmother     Alcohol abuse Paternal Grandfather        Social History     Socioeconomic History    Marital status:    Occupational History    Occupation:    Tobacco Use    Smoking status: Every Day     Packs/day: 1.00     Years: 35.00     Pack years: 35.00     Types: Cigarettes    Smokeless tobacco: Never   Substance and Sexual Activity    Alcohol use: Yes     Alcohol/week: 8.0 standard drinks     Types: 8 Cans of beer per week     Comment: every weekend - a couple beers per weekend night    Drug use: No    Sexual activity: Yes     Partners: Female

## 2022-11-11 DIAGNOSIS — E78.2 MIXED HYPERLIPIDEMIA: ICD-10-CM

## 2022-11-11 DIAGNOSIS — I10 ESSENTIAL HYPERTENSION: ICD-10-CM

## 2022-11-11 RX ORDER — AMLODIPINE BESYLATE 10 MG/1
10 TABLET ORAL DAILY
Qty: 90 TABLET | Refills: 0 | Status: SHIPPED | OUTPATIENT
Start: 2022-11-11 | End: 2023-03-16 | Stop reason: SDUPTHER

## 2022-11-11 RX ORDER — EZETIMIBE 10 MG/1
10 TABLET ORAL DAILY
Qty: 90 TABLET | Refills: 0 | Status: SHIPPED | OUTPATIENT
Start: 2022-11-11 | End: 2023-03-16 | Stop reason: SDUPTHER

## 2022-11-11 RX ORDER — LISINOPRIL 40 MG/1
40 TABLET ORAL DAILY
Qty: 90 TABLET | Refills: 0 | Status: SHIPPED | OUTPATIENT
Start: 2022-11-11 | End: 2023-03-16 | Stop reason: SDUPTHER

## 2022-11-11 RX ORDER — DOXAZOSIN 8 MG/1
8 TABLET ORAL DAILY
Qty: 90 TABLET | Refills: 1 | Status: SHIPPED | OUTPATIENT
Start: 2022-11-11 | End: 2023-05-04 | Stop reason: SDUPTHER

## 2022-11-11 RX ORDER — TRIAMTERENE/HYDROCHLOROTHIAZID 37.5-25 MG
1 TABLET ORAL DAILY
Qty: 90 TABLET | Refills: 0 | Status: SHIPPED | OUTPATIENT
Start: 2022-11-11 | End: 2023-03-16 | Stop reason: SDUPTHER

## 2023-02-06 ENCOUNTER — OFFICE VISIT (OUTPATIENT)
Dept: FAMILY MEDICINE | Facility: CLINIC | Age: 58
End: 2023-02-06
Payer: COMMERCIAL

## 2023-02-06 VITALS
OXYGEN SATURATION: 96 % | SYSTOLIC BLOOD PRESSURE: 124 MMHG | TEMPERATURE: 99 F | WEIGHT: 261.25 LBS | HEART RATE: 74 BPM | DIASTOLIC BLOOD PRESSURE: 68 MMHG | BODY MASS INDEX: 38.69 KG/M2 | HEIGHT: 69 IN

## 2023-02-06 DIAGNOSIS — Z12.2 SCREENING FOR LUNG CANCER: ICD-10-CM

## 2023-02-06 DIAGNOSIS — Z12.11 COLON CANCER SCREENING: ICD-10-CM

## 2023-02-06 DIAGNOSIS — F90.0 ADHD (ATTENTION DEFICIT HYPERACTIVITY DISORDER), INATTENTIVE TYPE: Primary | ICD-10-CM

## 2023-02-06 DIAGNOSIS — I10 ESSENTIAL HYPERTENSION: ICD-10-CM

## 2023-02-06 DIAGNOSIS — F17.210 CIGARETTE SMOKER: ICD-10-CM

## 2023-02-06 DIAGNOSIS — Z86.010 PERSONAL HISTORY OF COLONIC POLYPS: ICD-10-CM

## 2023-02-06 PROCEDURE — 99214 PR OFFICE/OUTPT VISIT, EST, LEVL IV, 30-39 MIN: ICD-10-PCS | Mod: S$GLB,,, | Performed by: NURSE PRACTITIONER

## 2023-02-06 PROCEDURE — 3078F DIAST BP <80 MM HG: CPT | Mod: CPTII,S$GLB,, | Performed by: NURSE PRACTITIONER

## 2023-02-06 PROCEDURE — 3008F PR BODY MASS INDEX (BMI) DOCUMENTED: ICD-10-PCS | Mod: CPTII,S$GLB,, | Performed by: NURSE PRACTITIONER

## 2023-02-06 PROCEDURE — 3078F PR MOST RECENT DIASTOLIC BLOOD PRESSURE < 80 MM HG: ICD-10-PCS | Mod: CPTII,S$GLB,, | Performed by: NURSE PRACTITIONER

## 2023-02-06 PROCEDURE — 1159F MED LIST DOCD IN RCRD: CPT | Mod: CPTII,S$GLB,, | Performed by: NURSE PRACTITIONER

## 2023-02-06 PROCEDURE — 1160F PR REVIEW ALL MEDS BY PRESCRIBER/CLIN PHARMACIST DOCUMENTED: ICD-10-PCS | Mod: CPTII,S$GLB,, | Performed by: NURSE PRACTITIONER

## 2023-02-06 PROCEDURE — 1160F RVW MEDS BY RX/DR IN RCRD: CPT | Mod: CPTII,S$GLB,, | Performed by: NURSE PRACTITIONER

## 2023-02-06 PROCEDURE — 3074F SYST BP LT 130 MM HG: CPT | Mod: CPTII,S$GLB,, | Performed by: NURSE PRACTITIONER

## 2023-02-06 PROCEDURE — 99999 PR PBB SHADOW E&M-EST. PATIENT-LVL V: ICD-10-PCS | Mod: PBBFAC,,, | Performed by: NURSE PRACTITIONER

## 2023-02-06 PROCEDURE — 1159F PR MEDICATION LIST DOCUMENTED IN MEDICAL RECORD: ICD-10-PCS | Mod: CPTII,S$GLB,, | Performed by: NURSE PRACTITIONER

## 2023-02-06 PROCEDURE — 3074F PR MOST RECENT SYSTOLIC BLOOD PRESSURE < 130 MM HG: ICD-10-PCS | Mod: CPTII,S$GLB,, | Performed by: NURSE PRACTITIONER

## 2023-02-06 PROCEDURE — 99214 OFFICE O/P EST MOD 30 MIN: CPT | Mod: S$GLB,,, | Performed by: NURSE PRACTITIONER

## 2023-02-06 PROCEDURE — 99999 PR PBB SHADOW E&M-EST. PATIENT-LVL V: CPT | Mod: PBBFAC,,, | Performed by: NURSE PRACTITIONER

## 2023-02-06 PROCEDURE — 3008F BODY MASS INDEX DOCD: CPT | Mod: CPTII,S$GLB,, | Performed by: NURSE PRACTITIONER

## 2023-02-06 RX ORDER — DEXTROAMPHETAMINE SULFATE, DEXTROAMPHETAMINE SACCHARATE, AMPHETAMINE SULFATE AND AMPHETAMINE ASPARTATE 7.5; 7.5; 7.5; 7.5 MG/1; MG/1; MG/1; MG/1
60 CAPSULE, EXTENDED RELEASE ORAL EVERY MORNING
Qty: 180 CAPSULE | Refills: 0 | Status: SHIPPED | OUTPATIENT
Start: 2023-02-06 | End: 2023-04-28 | Stop reason: SDUPTHER

## 2023-02-06 RX ORDER — ESOMEPRAZOLE MAGNESIUM 40 MG/1
40 CAPSULE, DELAYED RELEASE ORAL DAILY
Qty: 90 CAPSULE | Refills: 3 | Status: SHIPPED | OUTPATIENT
Start: 2023-02-06 | End: 2023-04-28 | Stop reason: ALTCHOICE

## 2023-02-06 NOTE — PROGRESS NOTES
"Subjective:       Patient ID: Nick Mcdonough Sr. is a 57 y.o. male.    Chief Complaint: Medication Refill (3mo med check)    Medication Refill  Pertinent negatives include no abdominal pain, arthralgias, chest pain, congestion, coughing, fatigue, fever, headaches, joint swelling, myalgias, nausea, neck pain, rash, sore throat or vomiting.     Patient is a 57-year-old white male with Hypertension, Hyperlipidemia, chronic GERD, ADHD, tobacco user, fluid retention to lower extremities with venous stasis dermatitis, and obesity that is here today for 3 month follow up. Last wellness exam in August 2022.     Hypertension  Currently taking Lisinopril 40 mg daily, Maxzide 25 mg daily, Doxazosin 8 mg daily and Amlodipine 10 mg daily.   Blood pressure is well controlled on these medications with no fluid retention.  /68 (BP Location: Right arm, Patient Position: Sitting, BP Method: Large (Manual))   Pulse 74   Temp 98.6 °F (37 °C)   Ht 5' 9" (1.753 m)   Wt 118.5 kg (261 lb 3.9 oz)   SpO2 96%   BMI 38.58 kg/m²           Hyperlipidemia  Currently taking Zetia 10 mg daily in place of Atorvastatin 10 mg daily due to high triglycerides and low HDL   Triglycerides did improve from 233 now down to 94    HDL improved from 26 to 39  LDL 37.2  Intolerance to Rosuvastatin due to acute itching  Much improved on Zetia in place of statin.      Obese  Body mass index is 38.58 kg/m².      ADHD   stable on current regimen Adderall XR 30 mg 2 capsules daily.    Patient is able to focus and complete tasks effectively.  No side effects reported.   Patient has intolerance to GENERIC Adderall XR in the past due to ineffectiveness and crash effect but has been taking the BRAND Adderall XR 30 mg 2 capsule daily since prior to 2017 without issue.     Current smoker   for over 30 years - 1ppd smoker.    States he started smoking around age 21.    He declines the smoking cessation program.    CT lung screen up to date 1/13/2022 - will " "schedule today      Review of Systems   Constitutional:  Negative for activity change, appetite change, fatigue, fever and unexpected weight change.   HENT:  Negative for congestion, ear pain, mouth sores, nosebleeds, postnasal drip, rhinorrhea, sinus pressure, sneezing, sore throat, trouble swallowing and voice change.    Eyes: Negative.    Respiratory:  Negative for cough, chest tightness and shortness of breath.    Cardiovascular:  Negative for chest pain, palpitations and leg swelling.   Gastrointestinal: Negative.  Negative for abdominal pain, blood in stool, constipation, diarrhea, nausea and vomiting.   Endocrine: Negative.    Genitourinary:  Negative for difficulty urinating, dysuria, flank pain, hematuria and urgency.   Musculoskeletal:  Negative for arthralgias, back pain, gait problem, joint swelling, myalgias and neck pain.   Skin:  Negative for color change, rash and wound.   Allergic/Immunologic: Negative for immunocompromised state.   Neurological:  Negative for dizziness, tremors, seizures, syncope, speech difficulty and headaches.   Hematological:  Negative for adenopathy. Does not bruise/bleed easily.   Psychiatric/Behavioral:  Negative for behavioral problems, dysphoric mood, sleep disturbance and suicidal ideas. The patient is not nervous/anxious.        Objective:     Vitals:    02/06/23 1446   BP: 124/68   BP Location: Right arm   Patient Position: Sitting   BP Method: Large (Manual)   Pulse: 74   Temp: 98.6 °F (37 °C)   SpO2: 96%   Weight: 118.5 kg (261 lb 3.9 oz)   Height: 5' 9" (1.753 m)          Physical Exam  Constitutional:       General: He is not in acute distress.     Appearance: He is well-developed. He is obese. He is not ill-appearing, toxic-appearing or diaphoretic.      Comments: + obesity. Body mass index is 38.58 kg/m².           HENT:      Head: Normocephalic and atraumatic.      Right Ear: External ear normal.      Left Ear: External ear normal.   Eyes:      General: No " scleral icterus.        Right eye: No discharge.         Left eye: No discharge.      Extraocular Movements: Extraocular movements intact.      Conjunctiva/sclera: Conjunctivae normal.      Pupils: Pupils are equal, round, and reactive to light.   Neck:      Thyroid: No thyromegaly.      Vascular: No JVD.      Trachea: No tracheal deviation.   Cardiovascular:      Rate and Rhythm: Normal rate and regular rhythm.      Heart sounds: Normal heart sounds. No murmur heard.  Pulmonary:      Effort: Pulmonary effort is normal. No respiratory distress.      Breath sounds: Normal breath sounds. No stridor. No wheezing or rhonchi.   Abdominal:      General: There is no distension.   Musculoskeletal:         General: Normal range of motion.      Cervical back: Normal range of motion and neck supple.   Lymphadenopathy:      Cervical: No cervical adenopathy.   Skin:     General: Skin is warm and dry.      Findings: No rash.   Neurological:      Mental Status: He is alert and oriented to person, place, and time.      Coordination: Coordination normal.   Psychiatric:         Mood and Affect: Mood normal.         Behavior: Behavior normal.         Thought Content: Thought content normal.         Judgment: Judgment normal.         Assessment:         ICD-10-CM ICD-9-CM   1. ADHD (attention deficit hyperactivity disorder), inattentive type  F90.0 314.00   2. Essential hypertension  I10 401.9   3. Colon cancer screening  Z12.11 V76.51   4. Personal history of colonic polyps  Z86.010 V12.72   5. Screening for lung cancer  Z12.2 V76.0   6. Cigarette smoker  F17.210 305.1       Plan:       ADHD (attention deficit hyperactivity disorder), inattentive type  Continue current medication(s).  Follow up in 3 months.  Prescription printed and handed to patient due to medication shortage in case he has to try different pharmacies.  -     ADDERALL XR 30 mg 24 hr capsule; Take 2 capsules (60 mg total) by mouth every morning. BRAND ONLY  Dispense:  180 capsule; Refill: 0    Essential hypertension  Continue current medication(s).  Follow up in 3 months.    Colon cancer screening  Ordered with Dr. Kaplan  -     Case Request Endoscopy: COLONOSCOPY    Personal history of colonic polyps  -     Case Request Endoscopy: COLONOSCOPY    Screening for lung cancer  -     CT Chest Lung Screening Low Dose; Future; Expected date: 02/06/2023    Cigarette smoker  -     CT Chest Lung Screening Low Dose; Future; Expected date: 02/06/2023    Other orders  -     esomeprazole (NEXIUM) 40 MG capsule; Take 1 capsule (40 mg total) by mouth once daily.  Dispense: 90 capsule; Refill: 3      Follow up if symptoms worsen or fail to improve.     Patient's Medications   New Prescriptions    No medications on file   Previous Medications    AMLODIPINE (NORVASC) 10 MG TABLET    Take 1 tablet (10 mg total) by mouth once daily.    DICLOFENAC (VOLTAREN) 50 MG EC TABLET    Take 1 tablet (50 mg total) by mouth 2 (two) times daily.    DOXAZOSIN (CARDURA) 8 MG TAB    Take 1 tablet (8 mg total) by mouth once daily.    EZETIMIBE (ZETIA) 10 MG TABLET    Take 1 tablet (10 mg total) by mouth once daily.    FISH OIL-OMEGA-3 FATTY ACIDS 300-1,000 MG CAPSULE    Take 1 g by mouth once daily.    LISINOPRIL (PRINIVIL,ZESTRIL) 40 MG TABLET    Take 1 tablet (40 mg total) by mouth once daily.    NIACIN 500 MG TAB    Take 1 tablet (500 mg total) by mouth every evening.    TRIAMTERENE-HYDROCHLOROTHIAZIDE 37.5-25 MG (MAXZIDE-25) 37.5-25 MG PER TABLET    Take 1 tablet by mouth once daily.   Modified Medications    Modified Medication Previous Medication    ADDERALL XR 30 MG 24 HR CAPSULE ADDERALL XR 30 mg 24 hr capsule       Take 2 capsules (60 mg total) by mouth every morning. BRAND ONLY    Take 2 capsules (60 mg total) by mouth every morning. BRAND ONLY    ESOMEPRAZOLE (NEXIUM) 40 MG CAPSULE esomeprazole (NEXIUM) 40 MG capsule       Take 1 capsule (40 mg total) by mouth once daily.    Take 1 capsule (40 mg total) by  mouth once daily.   Discontinued Medications    No medications on file       Past Medical History:   Diagnosis Date    Adult ADHD     Alcohol dependency 2015    last alcohol intake 2015    Colon polyp 2018    2 polyps - tubular adenoma - repeat colonoscopy in 5 years    GERD (gastroesophageal reflux disease)     Hyperlipidemia     Hypertension        Past Surgical History:   Procedure Laterality Date    COLONOSCOPY N/A 2018    Procedure: COLONOSCOPY;  Surgeon: Eduard Medley MD;  Location: [facility];  Service: Endoscopy;  Laterality: N/A;    HERNIA REPAIR      INGUINAL HERNIA REPAIR Left     done at [facility]    KNEE SURGERY Right     VASECTOMY         Family History   Problem Relation Age of Onset    Hypertension Father     Arthritis Mother     Depression Mother     Drug abuse Mother     Hyperlipidemia Mother     Hypertension Mother     Mental illness Mother     Heart disease Brother         acute MI -  at at 46    Alcohol abuse Brother     Early death Brother     Mental illness Brother     Cancer Maternal Grandmother     No Known Problems Maternal Grandfather     Heart disease Paternal Grandmother     Alcohol abuse Paternal Grandfather        Social History     Socioeconomic History    Marital status:    Occupational History    Occupation:    Tobacco Use    Smoking status: Every Day     Packs/day: 1.00     Years: 35.00     Pack years: 35.00     Types: Cigarettes    Smokeless tobacco: Never   Substance and Sexual Activity    Alcohol use: Yes     Alcohol/week: 8.0 standard drinks     Types: 8 Cans of beer per week     Comment: every weekend - a couple beers per weekend night    Drug use: No    Sexual activity: Yes     Partners: Female

## 2023-02-13 ENCOUNTER — TELEPHONE (OUTPATIENT)
Dept: GASTROENTEROLOGY | Facility: CLINIC | Age: 58
End: 2023-02-13
Payer: COMMERCIAL

## 2023-02-13 NOTE — LETTER
February 13, 2023    Nick Mcdonough Sr.  [address] [place] [address]             [facility]  [address], [place]   [place] [address]  Phone: [phone]  Fax: [phone] Dear Mr. Mcdonough:    We have attempted to contact you to schedule a screening colonoscopy that was ordered by your doctor. Please contact the office to schedule at [phone].       If you have any questions or concerns, please don't hesitate to call.    Sincerely,        Callie Kaplan MD

## 2023-03-08 NOTE — PATIENT INSTRUCTIONS
If you were prescribed a narcotic or controlled medication, do not drive or operate heavy equipment or machinery while taking these medications.  You must understand that you've received an Urgent Care treatment only and that you may be released before all your medical problems are known or treated. You, the patient, will arrange for follow up care as instructed.  Follow up with your PCP or specialty clinic as directed in the next 1-2 weeks if not improved or as needed.  You can call [phone] to schedule an appointment with the appropriate provider.  If your condition worsens we recommend that you receive another evaluation at the emergency room immediately or contact your primary medical clinics after hours call service to discuss your concerns.  Please return here or go to the Emergency Department for any concerns or worsening of condition.  Take Mucinex D in the morning and Mucinex DM at night.   Mucinex D has 120 mg of Pseudoephedrine in it and can cause palpitations and high blood pressure. If you experience this stop the Mucinex D and only take Mucinex DM (twice a day).     08-Mar-2023

## 2023-03-16 DIAGNOSIS — E78.2 MIXED HYPERLIPIDEMIA: ICD-10-CM

## 2023-03-16 DIAGNOSIS — I10 ESSENTIAL HYPERTENSION: ICD-10-CM

## 2023-03-17 RX ORDER — TRIAMTERENE/HYDROCHLOROTHIAZID 37.5-25 MG
1 TABLET ORAL DAILY
Qty: 90 TABLET | Refills: 0 | Status: SHIPPED | OUTPATIENT
Start: 2023-03-17 | End: 2023-06-28 | Stop reason: SDUPTHER

## 2023-03-17 RX ORDER — EZETIMIBE 10 MG/1
10 TABLET ORAL DAILY
Qty: 90 TABLET | Refills: 0 | Status: SHIPPED | OUTPATIENT
Start: 2023-03-17 | End: 2023-06-22 | Stop reason: SDUPTHER

## 2023-03-17 RX ORDER — AMLODIPINE BESYLATE 10 MG/1
10 TABLET ORAL DAILY
Qty: 90 TABLET | Refills: 0 | Status: SHIPPED | OUTPATIENT
Start: 2023-03-17 | End: 2023-06-22 | Stop reason: SDUPTHER

## 2023-03-17 RX ORDER — LISINOPRIL 40 MG/1
40 TABLET ORAL DAILY
Qty: 90 TABLET | Refills: 0 | Status: SHIPPED | OUTPATIENT
Start: 2023-03-17 | End: 2023-06-22 | Stop reason: SDUPTHER

## 2023-04-28 ENCOUNTER — OFFICE VISIT (OUTPATIENT)
Dept: FAMILY MEDICINE | Facility: CLINIC | Age: 58
End: 2023-04-28
Payer: COMMERCIAL

## 2023-04-28 VITALS
SYSTOLIC BLOOD PRESSURE: 124 MMHG | OXYGEN SATURATION: 98 % | HEART RATE: 86 BPM | HEIGHT: 69 IN | WEIGHT: 263.75 LBS | TEMPERATURE: 98 F | DIASTOLIC BLOOD PRESSURE: 84 MMHG | BODY MASS INDEX: 39.06 KG/M2

## 2023-04-28 DIAGNOSIS — Z86.010 PERSONAL HISTORY OF COLONIC POLYPS: ICD-10-CM

## 2023-04-28 DIAGNOSIS — Z13.29 THYROID DISORDER SCREEN: ICD-10-CM

## 2023-04-28 DIAGNOSIS — Z12.5 SCREENING PSA (PROSTATE SPECIFIC ANTIGEN): ICD-10-CM

## 2023-04-28 DIAGNOSIS — Z72.0 TOBACCO USER: ICD-10-CM

## 2023-04-28 DIAGNOSIS — Z00.00 ENCOUNTER FOR BLOOD TEST FOR ROUTINE GENERAL PHYSICAL EXAMINATION: ICD-10-CM

## 2023-04-28 DIAGNOSIS — Z13.0 SCREENING FOR DEFICIENCY ANEMIA: ICD-10-CM

## 2023-04-28 DIAGNOSIS — Z13.1 DIABETES MELLITUS SCREENING: ICD-10-CM

## 2023-04-28 DIAGNOSIS — E78.2 MIXED HYPERLIPIDEMIA: ICD-10-CM

## 2023-04-28 DIAGNOSIS — E66.01 CLASS 2 SEVERE OBESITY DUE TO EXCESS CALORIES WITH SERIOUS COMORBIDITY AND BODY MASS INDEX (BMI) OF 38.0 TO 38.9 IN ADULT: Primary | ICD-10-CM

## 2023-04-28 DIAGNOSIS — K21.9 CHRONIC GERD: ICD-10-CM

## 2023-04-28 DIAGNOSIS — I10 ESSENTIAL HYPERTENSION: ICD-10-CM

## 2023-04-28 DIAGNOSIS — F90.0 ADHD (ATTENTION DEFICIT HYPERACTIVITY DISORDER), INATTENTIVE TYPE: ICD-10-CM

## 2023-04-28 PROBLEM — R60.0 LEG EDEMA, LEFT: Status: RESOLVED | Noted: 2018-10-19 | Resolved: 2023-04-28

## 2023-04-28 PROCEDURE — 3079F DIAST BP 80-89 MM HG: CPT | Mod: CPTII,S$GLB,, | Performed by: NURSE PRACTITIONER

## 2023-04-28 PROCEDURE — 3074F PR MOST RECENT SYSTOLIC BLOOD PRESSURE < 130 MM HG: ICD-10-PCS | Mod: CPTII,S$GLB,, | Performed by: NURSE PRACTITIONER

## 2023-04-28 PROCEDURE — 3079F PR MOST RECENT DIASTOLIC BLOOD PRESSURE 80-89 MM HG: ICD-10-PCS | Mod: CPTII,S$GLB,, | Performed by: NURSE PRACTITIONER

## 2023-04-28 PROCEDURE — 4010F ACE/ARB THERAPY RXD/TAKEN: CPT | Mod: CPTII,S$GLB,, | Performed by: NURSE PRACTITIONER

## 2023-04-28 PROCEDURE — 4010F PR ACE/ARB THEARPY RXD/TAKEN: ICD-10-PCS | Mod: CPTII,S$GLB,, | Performed by: NURSE PRACTITIONER

## 2023-04-28 PROCEDURE — 3008F BODY MASS INDEX DOCD: CPT | Mod: CPTII,S$GLB,, | Performed by: NURSE PRACTITIONER

## 2023-04-28 PROCEDURE — 1160F PR REVIEW ALL MEDS BY PRESCRIBER/CLIN PHARMACIST DOCUMENTED: ICD-10-PCS | Mod: CPTII,S$GLB,, | Performed by: NURSE PRACTITIONER

## 2023-04-28 PROCEDURE — 99214 OFFICE O/P EST MOD 30 MIN: CPT | Mod: S$GLB,,, | Performed by: NURSE PRACTITIONER

## 2023-04-28 PROCEDURE — 99214 PR OFFICE/OUTPT VISIT, EST, LEVL IV, 30-39 MIN: ICD-10-PCS | Mod: S$GLB,,, | Performed by: NURSE PRACTITIONER

## 2023-04-28 PROCEDURE — 1159F MED LIST DOCD IN RCRD: CPT | Mod: CPTII,S$GLB,, | Performed by: NURSE PRACTITIONER

## 2023-04-28 PROCEDURE — 99999 PR PBB SHADOW E&M-EST. PATIENT-LVL IV: CPT | Mod: PBBFAC,,, | Performed by: NURSE PRACTITIONER

## 2023-04-28 PROCEDURE — 3074F SYST BP LT 130 MM HG: CPT | Mod: CPTII,S$GLB,, | Performed by: NURSE PRACTITIONER

## 2023-04-28 PROCEDURE — 1160F RVW MEDS BY RX/DR IN RCRD: CPT | Mod: CPTII,S$GLB,, | Performed by: NURSE PRACTITIONER

## 2023-04-28 PROCEDURE — 1159F PR MEDICATION LIST DOCUMENTED IN MEDICAL RECORD: ICD-10-PCS | Mod: CPTII,S$GLB,, | Performed by: NURSE PRACTITIONER

## 2023-04-28 PROCEDURE — 99999 PR PBB SHADOW E&M-EST. PATIENT-LVL IV: ICD-10-PCS | Mod: PBBFAC,,, | Performed by: NURSE PRACTITIONER

## 2023-04-28 PROCEDURE — 3008F PR BODY MASS INDEX (BMI) DOCUMENTED: ICD-10-PCS | Mod: CPTII,S$GLB,, | Performed by: NURSE PRACTITIONER

## 2023-04-28 RX ORDER — OMEPRAZOLE 40 MG/1
40 CAPSULE, DELAYED RELEASE ORAL DAILY
Qty: 90 CAPSULE | Refills: 0 | Status: SHIPPED | OUTPATIENT
Start: 2023-04-28 | End: 2023-06-22 | Stop reason: SDUPTHER

## 2023-04-28 RX ORDER — DEXTROAMPHETAMINE SULFATE, DEXTROAMPHETAMINE SACCHARATE, AMPHETAMINE SULFATE AND AMPHETAMINE ASPARTATE 7.5; 7.5; 7.5; 7.5 MG/1; MG/1; MG/1; MG/1
60 CAPSULE, EXTENDED RELEASE ORAL EVERY MORNING
Qty: 180 CAPSULE | Refills: 0 | Status: SHIPPED | OUTPATIENT
Start: 2023-04-28 | End: 2023-08-03 | Stop reason: SDUPTHER

## 2023-04-28 NOTE — PROGRESS NOTES
"Subjective:       Patient ID: Nick Mcdonough Sr. is a 57 y.o. male.    Chief Complaint: ADHD (3 month med check) and Gastroesophageal Reflux (Discuss change in medication)        Patient is a 57-year-old white male with Hypertension, Hyperlipidemia, chronic GERD, personal history of colon polyps, ADHD, tobacco user, fluid retention to lower extremities with venous stasis dermatitis, and obesity that is here today for 3 month follow up. Last wellness exam in August 2022. States chronic reflux and insurance will no longer cover Nexium - asking for Omeprazole.     Hypertension  Currently taking Lisinopril 40 mg daily, Maxzide 25 mg daily, Doxazosin 8 mg daily and Amlodipine 10 mg daily.   Blood pressure is well controlled on these medications with no fluid retention.  /84 (BP Location: Left arm, Patient Position: Sitting, BP Method: Large (Manual))   Pulse 86   Temp 98.2 °F (36.8 °C) (Temporal)   Ht 5' 9" (1.753 m)   Wt 119.7 kg (263 lb 12.5 oz)   SpO2 98%   BMI 38.95 kg/m²         Hyperlipidemia  Currently taking Zetia 10 mg daily in place of Atorvastatin 10 mg daily due to high triglycerides and low HDL   Triglycerides did improve from 233 now down to 94  based on last labs in August 2022  HDL improved from 26 to 39  LDL 37.2  Intolerance to Rosuvastatin due to acute itching  Much improved on Zetia in place of statin.  Recheck in August 2023      Obese  Body mass index is 38.95 kg/m².        ADHD   stable on current regimen Adderall XR 30 mg 2 capsules daily.    Patient is able to focus and complete tasks effectively.  No side effects reported.   Patient has intolerance to GENERIC Adderall XR in the past due to ineffectiveness and crash effect but has been taking the BRAND Adderall XR 30 mg 2 capsule daily since prior to 2017 without issue.     Chronic GERD  Patient reports on PPI for multiple years  States he never had upper EGD  Reports Nexium 40 mg no longer covered - has to change to " Omeprazole  Will change to Omeprazole 40 mg daily  REFER TO GI for EGD for further evaluation - rule out Barretts, etc.    Personal History of Colon Polyps  Colonoscopy 2/2/2018 with Dr. Medley - 2 polyps - due to repeat in 5 years which is NOW  REFERRAL TO GI as need to dicuss getting EGD for chronic GERD and well as repeat Colonoscopy    Current smoker   for over 30 years - 1ppd smoker.    States he started smoking around age 21.    He declines the smoking cessation program.    CT lung screen up to date 2/17/2023      Review of Systems   Constitutional:  Negative for activity change, appetite change, fatigue, fever and unexpected weight change.   HENT:  Negative for congestion, ear pain, mouth sores, nosebleeds, postnasal drip, rhinorrhea, sinus pressure, sneezing, sore throat, trouble swallowing and voice change.    Eyes: Negative.    Respiratory:  Negative for cough, chest tightness and shortness of breath.    Cardiovascular:  Negative for chest pain, palpitations and leg swelling.   Gastrointestinal: Negative.  Negative for abdominal pain, blood in stool, constipation, diarrhea, nausea and vomiting.   Endocrine: Negative.    Genitourinary:  Negative for difficulty urinating, dysuria, flank pain, hematuria and urgency.   Musculoskeletal:  Negative for arthralgias, back pain, gait problem, joint swelling, myalgias and neck pain.   Skin:  Negative for color change, rash and wound.   Allergic/Immunologic: Negative for immunocompromised state.   Neurological:  Negative for dizziness, tremors, seizures, syncope, speech difficulty and headaches.   Hematological:  Negative for adenopathy. Does not bruise/bleed easily.   Psychiatric/Behavioral:  Negative for behavioral problems, dysphoric mood, sleep disturbance and suicidal ideas. The patient is not nervous/anxious.        Objective:     Vitals:    04/28/23 1046   BP: 124/84   BP Location: Left arm   Patient Position: Sitting   BP Method: Large (Manual)   Pulse: 86  "  Temp: 98.2 °F (36.8 °C)   TempSrc: Temporal   SpO2: 98%   Weight: 119.7 kg (263 lb 12.5 oz)   Height: 5' 9" (1.753 m)          Physical Exam  Constitutional:       General: He is not in acute distress.     Appearance: He is well-developed. He is obese. He is not ill-appearing, toxic-appearing or diaphoretic.      Comments: + obesity. Body mass index is 38.95 kg/m².       HENT:      Head: Normocephalic and atraumatic.      Right Ear: External ear normal.      Left Ear: External ear normal.   Eyes:      General: No scleral icterus.        Right eye: No discharge.         Left eye: No discharge.      Extraocular Movements: Extraocular movements intact.      Conjunctiva/sclera: Conjunctivae normal.      Pupils: Pupils are equal, round, and reactive to light.   Neck:      Thyroid: No thyromegaly.      Vascular: No JVD.      Trachea: No tracheal deviation.   Cardiovascular:      Rate and Rhythm: Normal rate and regular rhythm.      Heart sounds: Normal heart sounds. No murmur heard.  Pulmonary:      Effort: Pulmonary effort is normal. No respiratory distress.      Breath sounds: Normal breath sounds. No stridor. No wheezing or rhonchi.   Abdominal:      General: There is no distension.   Musculoskeletal:         General: Normal range of motion.      Cervical back: Normal range of motion and neck supple.   Lymphadenopathy:      Cervical: No cervical adenopathy.   Skin:     General: Skin is warm and dry.      Findings: No rash.   Neurological:      Mental Status: He is alert and oriented to person, place, and time.      Coordination: Coordination normal.   Psychiatric:         Mood and Affect: Mood normal.         Behavior: Behavior normal.         Thought Content: Thought content normal.         Judgment: Judgment normal.         Assessment:         ICD-10-CM ICD-9-CM   1. Class 2 severe obesity due to excess calories with serious comorbidity and body mass index (BMI) of 38.0 to 38.9 in adult  E66.01 278.01    Z68.38 " V85.38   2. Essential hypertension  I10 401.9   3. Mixed hyperlipidemia  E78.2 272.2   4. ADHD (attention deficit hyperactivity disorder), inattentive type  F90.0 314.00   5. Chronic GERD  K21.9 530.81   6. Personal history of colonic polyps  Z86.010 V12.72   7. Tobacco user  Z72.0 305.1   8. Encounter for blood test for routine general physical examination  Z00.00 V72.62   9. Screening for deficiency anemia  Z13.0 V78.1   10. Thyroid disorder screen  Z13.29 V77.0   11. Screening PSA (prostate specific antigen)  Z12.5 V76.44   12. Diabetes mellitus screening  Z13.1 V77.1       Plan:       Class 2 severe obesity due to excess calories with serious comorbidity and body mass index (BMI) of 38.0 to 38.9 in adult  Working on lifestyle modifications  Recheck in 3 months.    Essential hypertension  Continue current medication(s).  Follow up in 3 months.    Mixed hyperlipidemia  Due to check in August  -     Lipid Panel; Future; Expected date: 04/28/2023    ADHD (attention deficit hyperactivity disorder), inattentive type  Continue current medication(s).  Follow up in 3 months.  -     ADDERALL XR 30 mg 24 hr capsule; Take 2 capsules (60 mg total) by mouth every morning. BRAND ONLY  Dispense: 180 capsule; Refill: 0    Chronic GERD  CHANGE to Omeprazole  Referral to GI - needs EGD  -     Ambulatory referral/consult to Gastroenterology; Future; Expected date: 05/05/2023  -     omeprazole (PRILOSEC) 40 MG capsule; Take 1 capsule (40 mg total) by mouth once daily.  Dispense: 90 capsule; Refill: 0    Personal history of colonic polyps  Refer to GI for colonoscopy  -     Ambulatory referral/consult to Gastroenterology; Future; Expected date: 05/05/2023    Tobacco user  Declined smoking cessation    Encounter for blood test for routine general physical examination  -     CBC Auto Differential; Future; Expected date: 04/28/2023  -     Comprehensive Metabolic Panel; Future; Expected date: 04/28/2023  -     Hemoglobin A1C; Future;  Expected date: 04/28/2023  -     Lipid Panel; Future; Expected date: 04/28/2023  -     TSH; Future; Expected date: 04/28/2023  -     PSA, Screening; Future; Expected date: 04/28/2023    Screening for deficiency anemia  -     CBC Auto Differential; Future; Expected date: 04/28/2023    Thyroid disorder screen  -     TSH; Future; Expected date: 04/28/2023    Screening PSA (prostate specific antigen)  -     PSA, Screening; Future; Expected date: 04/28/2023    Diabetes mellitus screening  -     Comprehensive Metabolic Panel; Future; Expected date: 04/28/2023  -     Hemoglobin A1C; Future; Expected date: 04/28/2023      Follow up in about 15 weeks (around 8/11/2023) for fasting labs and WELLNESS EXAM.     Patient's Medications   New Prescriptions    OMEPRAZOLE (PRILOSEC) 40 MG CAPSULE    Take 1 capsule (40 mg total) by mouth once daily.   Previous Medications    AMLODIPINE (NORVASC) 10 MG TABLET    Take 1 tablet (10 mg total) by mouth once daily.    DOXAZOSIN (CARDURA) 8 MG TAB    Take 1 tablet (8 mg total) by mouth once daily.    EZETIMIBE (ZETIA) 10 MG TABLET    Take 1 tablet (10 mg total) by mouth once daily.    FISH OIL-OMEGA-3 FATTY ACIDS 300-1,000 MG CAPSULE    Take 1 g by mouth once daily.    LISINOPRIL (PRINIVIL,ZESTRIL) 40 MG TABLET    Take 1 tablet (40 mg total) by mouth once daily.    NIACIN 500 MG TAB    Take 1 tablet (500 mg total) by mouth every evening.    TRIAMTERENE-HYDROCHLOROTHIAZIDE 37.5-25 MG (MAXZIDE-25) 37.5-25 MG PER TABLET    Take 1 tablet by mouth once daily.   Modified Medications    Modified Medication Previous Medication    ADDERALL XR 30 MG 24 HR CAPSULE ADDERALL XR 30 mg 24 hr capsule       Take 2 capsules (60 mg total) by mouth every morning. BRAND ONLY    Take 2 capsules (60 mg total) by mouth every morning. BRAND ONLY   Discontinued Medications    DICLOFENAC (VOLTAREN) 50 MG EC TABLET    Take 1 tablet (50 mg total) by mouth 2 (two) times daily.    ESOMEPRAZOLE (NEXIUM) 40 MG CAPSULE     Take 1 capsule (40 mg total) by mouth once daily.       Past Medical History:   Diagnosis Date    Adult ADHD     Alcohol dependency 2015    last alcohol intake 2015    Colon polyp 2018    2 polyps - tubular adenoma - repeat colonoscopy in 5 years    GERD (gastroesophageal reflux disease)     Hyperlipidemia     Hypertension        Past Surgical History:   Procedure Laterality Date    COLONOSCOPY N/A 2018    Procedure: COLONOSCOPY;  Surgeon: Eduard Medley MD;  Location: [facility];  Service: Endoscopy;  Laterality: N/A;    HERNIA REPAIR      INGUINAL HERNIA REPAIR Left     done at [facility]    KNEE SURGERY Right     VASECTOMY         Family History   Problem Relation Age of Onset    Hypertension Father     Arthritis Mother     Depression Mother     Drug abuse Mother     Hyperlipidemia Mother     Hypertension Mother     Mental illness Mother     Heart disease Brother         acute MI -  at at 46    Alcohol abuse Brother     Early death Brother     Mental illness Brother     Cancer Maternal Grandmother     No Known Problems Maternal Grandfather     Heart disease Paternal Grandmother     Alcohol abuse Paternal Grandfather        Social History     Socioeconomic History    Marital status:    Occupational History    Occupation:    Tobacco Use    Smoking status: Every Day     Packs/day: 1.00     Years: 35.00     Pack years: 35.00     Types: Cigarettes    Smokeless tobacco: Never   Substance and Sexual Activity    Alcohol use: Yes     Alcohol/week: 8.0 standard drinks     Types: 8 Cans of beer per week     Comment: every weekend - a couple beers per weekend night    Drug use: No    Sexual activity: Yes     Partners: Female

## 2023-05-02 ENCOUNTER — TELEPHONE (OUTPATIENT)
Dept: FAMILY MEDICINE | Facility: CLINIC | Age: 58
End: 2023-05-02
Payer: COMMERCIAL

## 2023-05-04 DIAGNOSIS — I10 ESSENTIAL HYPERTENSION: ICD-10-CM

## 2023-05-04 DIAGNOSIS — K21.9 CHRONIC GERD: ICD-10-CM

## 2023-05-04 RX ORDER — OMEPRAZOLE 40 MG/1
40 CAPSULE, DELAYED RELEASE ORAL DAILY
Qty: 90 CAPSULE | Refills: 0 | Status: CANCELLED | OUTPATIENT
Start: 2023-05-04 | End: 2024-05-03

## 2023-05-05 RX ORDER — DOXAZOSIN 8 MG/1
8 TABLET ORAL DAILY
Qty: 90 TABLET | Refills: 1 | Status: SHIPPED | OUTPATIENT
Start: 2023-05-05 | End: 2023-06-22 | Stop reason: SDUPTHER

## 2023-06-22 ENCOUNTER — PATIENT MESSAGE (OUTPATIENT)
Dept: FAMILY MEDICINE | Facility: CLINIC | Age: 58
End: 2023-06-22
Payer: COMMERCIAL

## 2023-06-22 DIAGNOSIS — F90.0 ADHD (ATTENTION DEFICIT HYPERACTIVITY DISORDER), INATTENTIVE TYPE: ICD-10-CM

## 2023-06-22 DIAGNOSIS — E78.2 MIXED HYPERLIPIDEMIA: ICD-10-CM

## 2023-06-22 DIAGNOSIS — K21.9 CHRONIC GERD: ICD-10-CM

## 2023-06-22 DIAGNOSIS — I10 ESSENTIAL HYPERTENSION: ICD-10-CM

## 2023-06-22 RX ORDER — DEXTROAMPHETAMINE SULFATE, DEXTROAMPHETAMINE SACCHARATE, AMPHETAMINE SULFATE AND AMPHETAMINE ASPARTATE 7.5; 7.5; 7.5; 7.5 MG/1; MG/1; MG/1; MG/1
60 CAPSULE, EXTENDED RELEASE ORAL EVERY MORNING
Qty: 180 CAPSULE | Refills: 0 | Status: CANCELLED | OUTPATIENT
Start: 2023-06-22

## 2023-06-22 RX ORDER — LISINOPRIL 40 MG/1
40 TABLET ORAL DAILY
Qty: 90 TABLET | Refills: 1 | Status: SHIPPED | OUTPATIENT
Start: 2023-06-22 | End: 2023-12-14 | Stop reason: SDUPTHER

## 2023-06-22 RX ORDER — DOXAZOSIN 8 MG/1
8 TABLET ORAL DAILY
Qty: 90 TABLET | Refills: 1 | Status: SHIPPED | OUTPATIENT
Start: 2023-06-22 | End: 2023-12-14 | Stop reason: SDUPTHER

## 2023-06-22 RX ORDER — AMLODIPINE BESYLATE 10 MG/1
10 TABLET ORAL DAILY
Qty: 90 TABLET | Refills: 1 | Status: SHIPPED | OUTPATIENT
Start: 2023-06-22 | End: 2023-12-14 | Stop reason: SDUPTHER

## 2023-06-22 RX ORDER — EZETIMIBE 10 MG/1
10 TABLET ORAL DAILY
Qty: 90 TABLET | Refills: 1 | Status: SHIPPED | OUTPATIENT
Start: 2023-06-22 | End: 2023-10-13

## 2023-06-22 RX ORDER — OMEPRAZOLE 40 MG/1
40 CAPSULE, DELAYED RELEASE ORAL DAILY
Qty: 90 CAPSULE | Refills: 1 | Status: SHIPPED | OUTPATIENT
Start: 2023-06-22 | End: 2023-12-14 | Stop reason: SDUPTHER

## 2023-06-22 NOTE — TELEPHONE ENCOUNTER
Jordana - his Adderall was just filled on 5/5/23 for 90 day supply so he is not due for that medication refill so I sent him a message over the portal to clarify what he needs.

## 2023-06-28 DIAGNOSIS — I10 ESSENTIAL HYPERTENSION: ICD-10-CM

## 2023-06-28 RX ORDER — TRIAMTERENE/HYDROCHLOROTHIAZID 37.5-25 MG
1 TABLET ORAL DAILY
Qty: 90 TABLET | Refills: 0 | Status: SHIPPED | OUTPATIENT
Start: 2023-06-28 | End: 2023-09-18 | Stop reason: SDUPTHER

## 2023-06-28 RX ORDER — TRIAMTERENE/HYDROCHLOROTHIAZID 37.5-25 MG
1 TABLET ORAL DAILY
Qty: 90 TABLET | Refills: 0 | Status: CANCELLED | OUTPATIENT
Start: 2023-06-28 | End: 2024-06-27

## 2023-07-31 ENCOUNTER — PATIENT MESSAGE (OUTPATIENT)
Dept: FAMILY MEDICINE | Facility: CLINIC | Age: 58
End: 2023-07-31
Payer: COMMERCIAL

## 2023-08-03 ENCOUNTER — OFFICE VISIT (OUTPATIENT)
Dept: FAMILY MEDICINE | Facility: CLINIC | Age: 58
End: 2023-08-03
Payer: COMMERCIAL

## 2023-08-03 VITALS
OXYGEN SATURATION: 97 % | HEIGHT: 69 IN | DIASTOLIC BLOOD PRESSURE: 76 MMHG | BODY MASS INDEX: 40.75 KG/M2 | HEART RATE: 101 BPM | SYSTOLIC BLOOD PRESSURE: 124 MMHG | TEMPERATURE: 98 F | WEIGHT: 275.13 LBS

## 2023-08-03 DIAGNOSIS — F90.0 ADHD (ATTENTION DEFICIT HYPERACTIVITY DISORDER), INATTENTIVE TYPE: Primary | ICD-10-CM

## 2023-08-03 DIAGNOSIS — I10 ESSENTIAL HYPERTENSION: ICD-10-CM

## 2023-08-03 DIAGNOSIS — H92.02 LEFT EAR PAIN: ICD-10-CM

## 2023-08-03 PROCEDURE — 4010F PR ACE/ARB THEARPY RXD/TAKEN: ICD-10-PCS | Mod: CPTII,S$GLB,, | Performed by: NURSE PRACTITIONER

## 2023-08-03 PROCEDURE — 99999 PR PBB SHADOW E&M-EST. PATIENT-LVL IV: ICD-10-PCS | Mod: PBBFAC,,, | Performed by: NURSE PRACTITIONER

## 2023-08-03 PROCEDURE — 3078F PR MOST RECENT DIASTOLIC BLOOD PRESSURE < 80 MM HG: ICD-10-PCS | Mod: CPTII,S$GLB,, | Performed by: NURSE PRACTITIONER

## 2023-08-03 PROCEDURE — 1159F PR MEDICATION LIST DOCUMENTED IN MEDICAL RECORD: ICD-10-PCS | Mod: CPTII,S$GLB,, | Performed by: NURSE PRACTITIONER

## 2023-08-03 PROCEDURE — 4010F ACE/ARB THERAPY RXD/TAKEN: CPT | Mod: CPTII,S$GLB,, | Performed by: NURSE PRACTITIONER

## 2023-08-03 PROCEDURE — 1159F MED LIST DOCD IN RCRD: CPT | Mod: CPTII,S$GLB,, | Performed by: NURSE PRACTITIONER

## 2023-08-03 PROCEDURE — 3008F PR BODY MASS INDEX (BMI) DOCUMENTED: ICD-10-PCS | Mod: CPTII,S$GLB,, | Performed by: NURSE PRACTITIONER

## 2023-08-03 PROCEDURE — 99999 PR PBB SHADOW E&M-EST. PATIENT-LVL IV: CPT | Mod: PBBFAC,,, | Performed by: NURSE PRACTITIONER

## 2023-08-03 PROCEDURE — 3008F BODY MASS INDEX DOCD: CPT | Mod: CPTII,S$GLB,, | Performed by: NURSE PRACTITIONER

## 2023-08-03 PROCEDURE — 3078F DIAST BP <80 MM HG: CPT | Mod: CPTII,S$GLB,, | Performed by: NURSE PRACTITIONER

## 2023-08-03 PROCEDURE — 99214 OFFICE O/P EST MOD 30 MIN: CPT | Mod: S$GLB,,, | Performed by: NURSE PRACTITIONER

## 2023-08-03 PROCEDURE — 3074F SYST BP LT 130 MM HG: CPT | Mod: CPTII,S$GLB,, | Performed by: NURSE PRACTITIONER

## 2023-08-03 PROCEDURE — 3074F PR MOST RECENT SYSTOLIC BLOOD PRESSURE < 130 MM HG: ICD-10-PCS | Mod: CPTII,S$GLB,, | Performed by: NURSE PRACTITIONER

## 2023-08-03 PROCEDURE — 99214 PR OFFICE/OUTPT VISIT, EST, LEVL IV, 30-39 MIN: ICD-10-PCS | Mod: S$GLB,,, | Performed by: NURSE PRACTITIONER

## 2023-08-03 RX ORDER — DEXTROAMPHETAMINE SULFATE, DEXTROAMPHETAMINE SACCHARATE, AMPHETAMINE SULFATE AND AMPHETAMINE ASPARTATE 7.5; 7.5; 7.5; 7.5 MG/1; MG/1; MG/1; MG/1
60 CAPSULE, EXTENDED RELEASE ORAL EVERY MORNING
Qty: 180 CAPSULE | Refills: 0 | Status: SHIPPED | OUTPATIENT
Start: 2023-08-03 | End: 2023-10-24 | Stop reason: SDUPTHER

## 2023-08-03 NOTE — PROGRESS NOTES
"Subjective:       Patient ID: Nick Mcdonough Sr. is a 57 y.o. male.    Chief Complaint: ADHD (F/U Med Check) and Otalgia (Left ear pain)    Otalgia   Pertinent negatives include no ear discharge.       Patient is a 57-year-old white male with Hypertension, Hyperlipidemia, chronic GERD, personal history of colon polyps, ADHD, tobacco user, fluid retention to lower extremities with venous stasis dermatitis, and obesity that is here today for medication refill. Last wellness exam in August 2022. He was supposed to have fasting labs and wellness exam but forgot to have blood work done so will reschedule for next month. Patient also has complaint of left ear pain.    Hypertension  Currently taking Lisinopril 40 mg daily, Maxzide 25 mg daily, Doxazosin 8 mg daily and Amlodipine 10 mg daily.   Blood pressure is well controlled on these medications with no fluid retention.  /76 (BP Location: Left arm, Patient Position: Sitting, BP Method: Large (Manual))   Pulse 101   Temp 98.2 °F (36.8 °C) (Temporal)   Ht 5' 9" (1.753 m)   Wt 124.8 kg (275 lb 2.2 oz)   SpO2 97%   BMI 40.63 kg/m²           Hyperlipidemia  Currently taking Zetia 10 mg daily in place of Atorvastatin 10 mg daily due to high triglycerides and low HDL   Triglycerides did improve from 233 now down to 94  based on last labs in August 2022  HDL improved from 26 to 39  LDL 37.2  Intolerance to Rosuvastatin due to acute itching  Much improved on Zetia in place of statin.  Recheck in August 2023      Obese  Body mass index is 40.63 kg/m².         ADHD   stable on current regimen Adderall XR 30 mg 2 capsules daily.    Patient is able to focus and complete tasks effectively.  No side effects reported.   Patient has intolerance to GENERIC Adderall XR in the past due to ineffectiveness and crash effect but has been taking the BRAND Adderall XR 30 mg 2 capsule daily since prior to 2017 without issue.     Chronic GERD  Patient reports on PPI for multiple " "years  States he never had upper EGD  Reports Nexium 40 mg no longer covered - has to change to Omeprazole  Will change to Omeprazole 40 mg daily  REFER TO GI for EGD for further evaluation - rule out Barretts, etc.     Personal History of Colon Polyps  Colonoscopy 2/2/2018 with Dr. Medley - 2 polyps - due to repeat in 5 years which is NOW  REFERRAL TO GI as need to dicuss getting EGD for chronic GERD and well as repeat Colonoscopy     Current smoker   for over 30 years - 1ppd smoker.    States he started smoking around age 21.    He declines the smoking cessation program.    CT lung screen up to date 2/17/2023    Left Ear Pain  Started a few days ago.  No pain to touch  Ear exam normal  Suspect eustachian tube dysfunction  Will treat with flonase and sudafed OTC      Review of Systems   Constitutional:  Negative for fever.   HENT:  Positive for congestion and ear pain. Negative for ear discharge and facial swelling.          Objective:     Vitals:    08/03/23 1319   BP: 124/76   BP Location: Left arm   Patient Position: Sitting   BP Method: Large (Manual)   Pulse: 101   Temp: 98.2 °F (36.8 °C)   TempSrc: Temporal   SpO2: 97%   Weight: 124.8 kg (275 lb 2.2 oz)   Height: 5' 9" (1.753 m)          Physical Exam  Constitutional:       General: He is not in acute distress.     Appearance: Normal appearance. He is obese. He is not toxic-appearing or diaphoretic.      Comments: Body mass index is 40.63 kg/m².     HENT:      Head: Normocephalic and atraumatic.      Right Ear: Tympanic membrane, ear canal and external ear normal. There is no impacted cerumen.      Left Ear: Tympanic membrane, ear canal and external ear normal. There is no impacted cerumen.      Nose: No congestion or rhinorrhea.      Mouth/Throat:      Pharynx: No oropharyngeal exudate or posterior oropharyngeal erythema.   Cardiovascular:      Rate and Rhythm: Normal rate and regular rhythm.   Pulmonary:      Effort: Pulmonary effort is normal. No " respiratory distress.   Abdominal:      General: There is no distension.   Skin:     General: Skin is warm and dry.   Neurological:      Mental Status: He is alert and oriented to person, place, and time.   Psychiatric:         Mood and Affect: Mood normal.         Behavior: Behavior normal.         Thought Content: Thought content normal.           Assessment:         ICD-10-CM ICD-9-CM   1. ADHD (attention deficit hyperactivity disorder), inattentive type  F90.0 314.00   2. Left ear pain  H92.02 388.70   3. Essential hypertension  I10 401.9       Plan:       ADHD (attention deficit hyperactivity disorder), inattentive type  Continue current medication(s).  Follow up in 3 months.  -     ADDERALL XR 30 mg 24 hr capsule; Take 2 capsules (60 mg total) by mouth every morning. BRAND ONLY  Dispense: 180 capsule; Refill: 0    Left ear pain  Use flonase and sudafed  Take ibuprofen three times daily for pain  Return if no improvement    Essential hypertension  Continue current medication(s).  Follow up in 4 months.      Follow up in about 4 weeks (around 8/31/2023) for fasting labs and wellness exam.     Patient's Medications   New Prescriptions    No medications on file   Previous Medications    AMLODIPINE (NORVASC) 10 MG TABLET    Take 1 tablet (10 mg total) by mouth once daily.    DOXAZOSIN (CARDURA) 8 MG TAB    Take 1 tablet (8 mg total) by mouth once daily.    EZETIMIBE (ZETIA) 10 MG TABLET    Take 1 tablet (10 mg total) by mouth once daily.    FISH OIL-OMEGA-3 FATTY ACIDS 300-1,000 MG CAPSULE    Take 1 g by mouth once daily.    LISINOPRIL (PRINIVIL,ZESTRIL) 40 MG TABLET    Take 1 tablet (40 mg total) by mouth once daily.    NIACIN 500 MG TAB    Take 1 tablet (500 mg total) by mouth every evening.    OMEPRAZOLE (PRILOSEC) 40 MG CAPSULE    Take 1 capsule (40 mg total) by mouth once daily.    TRIAMTERENE-HYDROCHLOROTHIAZIDE 37.5-25 MG (MAXZIDE-25) 37.5-25 MG PER TABLET    Take 1 tablet by mouth once daily.     VARICELLA-ZOSTER GE-AS01B, PF, (SHINGRIX, PF,) 50 MCG/0.5 ML INJECTION    Inject 0.5 mLs into the muscle once. for 1 dose   Modified Medications    Modified Medication Previous Medication    ADDERALL XR 30 MG 24 HR CAPSULE ADDERALL XR 30 mg 24 hr capsule       Take 2 capsules (60 mg total) by mouth every morning. BRAND ONLY    Take 2 capsules (60 mg total) by mouth every morning. BRAND ONLY   Discontinued Medications    CLINDAMYCIN (CLEOCIN) 300 MG CAPSULE    Take 2 tablets now, then one tablet four times daily until all gone    HYDROCODONE-ACETAMINOPHEN (NORCO) 5-325 MG PER TABLET    Take one tablet by mouth every 4 to 6 hours as needed for pain    IBUPROFEN (ADVIL,MOTRIN) 800 MG TABLET    Take one tablet by mouth every 6 to 8 hours as needed for pain       Past Medical History:   Diagnosis Date    Adult ADHD     Alcohol dependency 2015    last alcohol intake 2015    Colon polyp 2018    2 polyps - tubular adenoma - repeat colonoscopy in 5 years    GERD (gastroesophageal reflux disease)     Hyperlipidemia     Hypertension        Past Surgical History:   Procedure Laterality Date    COLONOSCOPY N/A 2018    Procedure: COLONOSCOPY;  Surgeon: Eduard Medley MD;  Location: [facility] ENDO;  Service: Endoscopy;  Laterality: N/A;    HERNIA REPAIR      INGUINAL HERNIA REPAIR Left     done at [facility]    KNEE SURGERY Right     VASECTOMY         Family History   Problem Relation Age of Onset    Hypertension Father     Arthritis Mother     Depression Mother     Drug abuse Mother     Hyperlipidemia Mother     Hypertension Mother     Mental illness Mother     Heart disease Brother         acute MI -  at at 46    Alcohol abuse Brother     Early death Brother     Mental illness Brother     Cancer Maternal Grandmother     No Known Problems Maternal Grandfather     Heart disease Paternal Grandmother     Alcohol abuse Paternal Grandfather        Social History     Socioeconomic History    Marital status:     Occupational History    Occupation:    Tobacco Use    Smoking status: Every Day     Current packs/day: 1.00     Average packs/day: 1 pack/day for 35.0 years (35.0 ttl pk-yrs)     Types: Cigarettes    Smokeless tobacco: Never   Substance and Sexual Activity    Alcohol use: Yes     Alcohol/week: 8.0 standard drinks of alcohol     Types: 8 Cans of beer per week     Comment: every weekend - a couple beers per weekend night    Drug use: No    Sexual activity: Yes     Partners: Female     Social Determinants of Health     Financial Resource Strain: Low Risk  (10/20/2021)    Overall Financial Resource Strain (CARDIA)     Difficulty of Paying Living Expenses: Not very hard   Food Insecurity: No Food Insecurity (10/20/2021)    Hunger Vital Sign     Worried About Running Out of Food in the Last Year: Never true     Ran Out of Food in the Last Year: Never true   Transportation Needs: No Transportation Needs (10/20/2021)    PRAPARE - Transportation     Lack of Transportation (Medical): No     Lack of Transportation (Non-Medical): No   Physical Activity: Insufficiently Active (10/20/2021)    Exercise Vital Sign     Days of Exercise per Week: 3 days     Minutes of Exercise per Session: 40 min   Stress: No Stress Concern Present (10/20/2021)    [employer] - Occupational Stress Questionnaire     Feeling of Stress : Only a little   Social Connections: Unknown (10/20/2021)    Social Connection and Isolation Panel [NHANES]     Frequency of Communication with Friends and Family: More than three times a week     Frequency of Social Gatherings with Friends and Family: More than three times a week     Active Member of Clubs or Organizations: No     Attends Club or Organization Meetings: Never     Marital Status:    Housing Stability: High Risk (10/20/2021)    Housing Stability Vital Sign     Unable to Pay for Housing in the Last Year: Yes     Number of Places Lived in the  Last Year: 1     Unstable Housing in the Last Year: No

## 2023-08-21 ENCOUNTER — OFFICE VISIT (OUTPATIENT)
Dept: GASTROENTEROLOGY | Facility: CLINIC | Age: 58
End: 2023-08-21
Payer: COMMERCIAL

## 2023-08-21 VITALS — WEIGHT: 275 LBS | HEIGHT: 69 IN | BODY MASS INDEX: 40.73 KG/M2

## 2023-08-21 DIAGNOSIS — Z72.0 TOBACCO USER: ICD-10-CM

## 2023-08-21 DIAGNOSIS — Z72.821 POOR SLEEP HYGIENE: ICD-10-CM

## 2023-08-21 DIAGNOSIS — Z79.899 ENCOUNTER FOR MONITORING LONG-TERM PROTON PUMP INHIBITOR THERAPY: ICD-10-CM

## 2023-08-21 DIAGNOSIS — D12.6 COLON ADENOMAS: Primary | ICD-10-CM

## 2023-08-21 DIAGNOSIS — K21.9 GASTROESOPHAGEAL REFLUX DISEASE, UNSPECIFIED WHETHER ESOPHAGITIS PRESENT: ICD-10-CM

## 2023-08-21 DIAGNOSIS — Z51.81 ENCOUNTER FOR MONITORING LONG-TERM PROTON PUMP INHIBITOR THERAPY: ICD-10-CM

## 2023-08-21 DIAGNOSIS — E66.01 MORBID OBESITY WITH BMI OF 40.0-44.9, ADULT: ICD-10-CM

## 2023-08-21 PROCEDURE — 1160F RVW MEDS BY RX/DR IN RCRD: CPT | Mod: CPTII,95,, | Performed by: INTERNAL MEDICINE

## 2023-08-21 PROCEDURE — 4010F PR ACE/ARB THEARPY RXD/TAKEN: ICD-10-PCS | Mod: CPTII,95,, | Performed by: INTERNAL MEDICINE

## 2023-08-21 PROCEDURE — 1160F PR REVIEW ALL MEDS BY PRESCRIBER/CLIN PHARMACIST DOCUMENTED: ICD-10-PCS | Mod: CPTII,95,, | Performed by: INTERNAL MEDICINE

## 2023-08-21 PROCEDURE — 99204 PR OFFICE/OUTPT VISIT, NEW, LEVL IV, 45-59 MIN: ICD-10-PCS | Mod: 95,,, | Performed by: INTERNAL MEDICINE

## 2023-08-21 PROCEDURE — 99204 OFFICE O/P NEW MOD 45 MIN: CPT | Mod: 95,,, | Performed by: INTERNAL MEDICINE

## 2023-08-21 PROCEDURE — 1159F PR MEDICATION LIST DOCUMENTED IN MEDICAL RECORD: ICD-10-PCS | Mod: CPTII,95,, | Performed by: INTERNAL MEDICINE

## 2023-08-21 PROCEDURE — 3008F PR BODY MASS INDEX (BMI) DOCUMENTED: ICD-10-PCS | Mod: CPTII,95,, | Performed by: INTERNAL MEDICINE

## 2023-08-21 PROCEDURE — 4010F ACE/ARB THERAPY RXD/TAKEN: CPT | Mod: CPTII,95,, | Performed by: INTERNAL MEDICINE

## 2023-08-21 PROCEDURE — 1159F MED LIST DOCD IN RCRD: CPT | Mod: CPTII,95,, | Performed by: INTERNAL MEDICINE

## 2023-08-21 PROCEDURE — 3008F BODY MASS INDEX DOCD: CPT | Mod: CPTII,95,, | Performed by: INTERNAL MEDICINE

## 2023-08-21 NOTE — PATIENT INSTRUCTIONS
"Procedure: EGD/Colonoscopy    Diagnosis: Surveillance colonoscopy - Hx of colon polyps, longstanding GERD    Procedure Timin-12 weeks    *If within 4 weeks selected, please michael as high priority*    *If greater than 12 weeks, please select "5-12 weeks" and delay sending until 2 months prior to requested date*    Provider: Any GI provider    Location: [address]    Additional Scheduling Information:  Constipation bowel prep protocol recommend 2nd floor for airway protection  Elevated BMI possible undiagnosed sleep apnea    Prep Specifications:Extended/Constipation prep    Is the patient taking a GLP-1 Agonist:no    Have you attached a patient to this message: yes   "

## 2023-08-21 NOTE — Clinical Note
Chanda please refer MAC to Sleep Medicine for evaluation of possible sleep apnea referral placed  Referral placed endoscopy schedulers for EGD colonoscopy for evaluation of history of colon adenomas polyps in longstanding GERD recommend 2nd floor for airway protection  Return to GI clinic 6 months for follow-up

## 2023-08-21 NOTE — PROGRESS NOTES
The patient location is: At home  The chief complaint leading to consultation is:  History of colon adenomas polyps due for surveillance colonoscopy in longstanding GERD    Visit type: audiovisual    Face to Face time with patient: 30 minutes of total time spent on the encounter, which includes face to face time and non-face to face time preparing to see the patient (eg, review of tests), Obtaining and/or reviewing separately obtained history, Documenting clinical information in the electronic or other health record, Independently interpreting results (not separately reported) and communicating results to the patient/family/caregiver, or Care coordination (not separately reported).       Each patient to whom he or she provides medical services by telemedicine is:  (1) informed of the relationship between the physician and patient and the respective role of any other health care provider with respect to management of the patient; and (2) notified that he or she may decline to receive medical services by telemedicine and may withdraw from such care at any time.    Notes:       [facility] Consultation Note    Reason for Consult:  The primary encounter diagnosis was Colon adenomas. Diagnoses of Gastroesophageal reflux disease, unspecified whether esophagitis present, Encounter for monitoring long-term proton pump inhibitor therapy, Morbid obesity with BMI of 40.0-44.9, adult, and Tobacco user were also pertinent to this visit.    PCP:   Giulia Solis       Referring MD:  No referring provider defined for this encounter.    Initial History of Present Illness (HPI):  This is a 58 y.o. male here for evaluation of history of colon adenomas polyps he is due for surveillance colonoscopy also says he would like to have an EGD he has been having her chronic heartburn symptoms for many years never had an EGD does have an elevated BMI of 40.61 has symptoms of poor sleep hygiene which very likely could be  sleep apnea is never been evaluated will refer him to Sleep Medicine for further evaluation he is on longstanding PPI will recommend EGD colonoscopy 1st MD available 2nd floor for airway protection.  He has 2-3 bowel movements a day but feels like he retained stool for a long time will ask for constipation bowel prep protocol he says he thinks that is a good idea.  He does smoke cigarettes we have offered to refer to smoking cessation he says he is currently not quite ready to quit smoking no blood in his stool no unintentional weight loss no dysphagia no odynophagia no abdominal pain no chest pain or shortness of breath.    Abdominal pain - no  Reflux -as above  Dysphagia - no   Bowel habits - normal  GI bleeding - none  NSAID usage - none    Interval HPI 08/21/2023:  The patient's last visit with me was on Visit date not found.      ROS:  Constitutional: No fevers, chills, No weight loss  ENT:  As above heartburn no dysphagia no odynophagia no hoarseness  CV: No chest pain, no palpitation, positive for hypertension  Pulm: No cough, No shortness of breath, no wheezing  Ophtho: No vision changes  GI: see HPI  Derm: No rash, no itching  Heme: No lymphadenopathy, No easy bruising  MSK: No significant arthritis  : No dysuria, No hematuria  Endo: No hot or cold intolerance  Neuro: No syncope, No seizure, no strokes  Psych: No uncontrolled anxiety, No uncontrolled depression    Medical History:  has a past medical history of Adult ADHD, Alcohol dependency (9/2015), Colon polyp (02/02/2018), GERD (gastroesophageal reflux disease), Hyperlipidemia, and Hypertension.    Surgical History:  has a past surgical history that includes Knee surgery (Right); Vasectomy; Hernia repair; Inguinal hernia repair (Left, 2012); and Colonoscopy (N/A, 2/2/2018).    Family History: family history includes Alcohol abuse in his brother and paternal grandfather; Arthritis in his mother; Cancer in his maternal grandmother; Depression in his  "mother; Drug abuse in his mother; Early death in his brother; Heart disease in his brother and paternal grandmother; Hyperlipidemia in his mother; Hypertension in his father and mother; Mental illness in his brother and mother; No Known Problems in his maternal grandfather..     Social History:  reports that he has been smoking cigarettes. He has a 35.0 pack-year smoking history. He has never used smokeless tobacco. He reports current alcohol use of about 8.0 standard drinks of alcohol per week. He reports that he does not use drugs.    Review of patient's allergies indicates:   Allergen Reactions    Pcn [penicillins]        Medication List with Changes/Refills   Current Medications    ADDERALL XR 30 MG 24 HR CAPSULE    Take 2 capsules (60 mg total) by mouth every morning. BRAND ONLY    AMLODIPINE (NORVASC) 10 MG TABLET    Take 1 tablet (10 mg total) by mouth once daily.    DOXAZOSIN (CARDURA) 8 MG TAB    Take 1 tablet (8 mg total) by mouth once daily.    EZETIMIBE (ZETIA) 10 MG TABLET    Take 1 tablet (10 mg total) by mouth once daily.    FISH OIL-OMEGA-3 FATTY ACIDS 300-1,000 MG CAPSULE    Take 1 g by mouth once daily.    LISINOPRIL (PRINIVIL,ZESTRIL) 40 MG TABLET    Take 1 tablet (40 mg total) by mouth once daily.    NIACIN 500 MG TAB    Take 1 tablet (500 mg total) by mouth every evening.    OMEPRAZOLE (PRILOSEC) 40 MG CAPSULE    Take 1 capsule (40 mg total) by mouth once daily.    TRIAMTERENE-HYDROCHLOROTHIAZIDE 37.5-25 MG (MAXZIDE-25) 37.5-25 MG PER TABLET    Take 1 tablet by mouth once daily.         Objective Findings:    Vital Signs:  Ht 5' 9" (1.753 m)   Wt 124.7 kg (275 lb)   BMI 40.61 kg/m²   Body mass index is 40.61 kg/m².    Physical Exam:  Telemedicine video visit  General Appearance: Well appearing in no acute distress  Eyes:    No scleral icterus  Neurologic:  Alert and oriented x4  Psychiatric:  Normal speech mentation and affect    Labs:  Lab Results   Component Value Date    WBC 8.36 08/06/2022 "    HGB 14.6 08/06/2022    HCT 42.8 08/06/2022     08/06/2022    CHOL 95 (L) 08/06/2022    TRIG 94 08/06/2022    HDL 39 (L) 08/06/2022    ALT 38 08/06/2022    AST 41 08/06/2022     08/06/2022    K 3.9 08/06/2022    CL 99 08/06/2022    CREATININE 0.91 08/06/2022    BUN 18 08/06/2022    CO2 30 (H) 08/06/2022    TSH 2.860 08/06/2022    PSA 1.0 08/06/2022    HGBA1C 5.4 08/06/2022       Medical Decision Making:  Lab work reviewed  Prior colonoscopy images and path personally reviewed by myself  Poor sleep hygiene talk given referral to Sleep Medicine talk given  EGD colonoscopy constipation bowel prep protocol talk given restricted diet talk given 2nd floor for airway protection talk given      Assessment:  1. Colon adenomas    2. Gastroesophageal reflux disease, unspecified whether esophagitis present    3. Encounter for monitoring long-term proton pump inhibitor therapy    4. Morbid obesity with BMI of 40.0-44.9, adult    5. Tobacco user         Recommendations:  1. Referral to Sleep Medicine for evaluation of possible sleep apnea patient with poor sleep hygiene  2. Morbid obesity recommend gradual weight loss ideally 27 lb over the next 12-18 months will refer to dietary for further evaluation and help  3. Tobacco use recommend smoking cessation he will let us know if he would like referral currently not quite ready to quit  4. EGD colonoscopy for longstanding GERD and history of adenomas colon polyps recommend constipation bowel prep protocol patient says you retained stool little bit thinks it is a good idea to have the constipation bowel prep protocol to ensure he is clean recommend 2nd floor for airway protection based on BMI possible undiagnosed sleep apnea large circumferential neck circumference  5. Return general GI clinic 3-6 months for follow-up sooner if any concerns or symptoms    No follow-ups on file.      Order summary:         Thank you so much for allowing me to participate in the care of  Nick Mcdonough Sr.    Tyler Ren MD    DISCLAIMER: This note was prepared with [employer] voice recognition transcription software. Garbled syntax, mangled or inadvertent pronouns, and other bizarre constructions may be attributed to that software system. While efforts were made to correct any mistakes made by this voice recognition program, some errors and/or omissions may remain in the note that were missed when the note was originally created.

## 2023-08-21 NOTE — Clinical Note
"Procedure: EGD/Colonoscopy  Diagnosis: Surveillance colonoscopy - Hx of colon polyps, longstanding GERD  Procedure Timin-12 weeks  *If within 4 weeks selected, please michael as high priority*  *If greater than 12 weeks, please select "5-12 weeks" and delay sending until 2 months prior to requested date*  Provider: Any GI provider  Location: [address]  Additional Scheduling Information:  Constipation bowel prep protocol recommend 2nd floor for airway protection Elevated BMI possible undiagnosed sleep apnea  Prep Specifications:Extended/Constipation prep  Is the patient taking a GLP-1 Agonist:no  Have you attached a patient to this message: yes "

## 2023-08-22 ENCOUNTER — TELEPHONE (OUTPATIENT)
Dept: ENDOSCOPY | Facility: HOSPITAL | Age: 58
End: 2023-08-22

## 2023-08-22 DIAGNOSIS — K21.9 GASTROESOPHAGEAL REFLUX DISEASE, UNSPECIFIED WHETHER ESOPHAGITIS PRESENT: Primary | ICD-10-CM

## 2023-08-22 DIAGNOSIS — Z86.010 HISTORY OF COLON POLYPS: ICD-10-CM

## 2023-08-22 NOTE — TELEPHONE ENCOUNTER
"----- Message from Tyler Ren MD sent at 2023  2:41 PM CDT -----  Procedure: EGD/Colonoscopy    Diagnosis: Surveillance colonoscopy - Hx of colon polyps, longstanding GERD    Procedure Timin-12 weeks    #If within 4 weeks selected, please michael as high priority#    #If greater than 12 weeks, please select "5-12 weeks" and delay sending until 2 months prior to requested date#    Provider: Any GI provider    Location: [address]    Additional Scheduling Information:  Constipation bowel prep protocol recommend 2nd floor for airway protection  Elevated BMI possible undiagnosed sleep apnea    Prep Specifications:Extended/Constipation prep    Is the patient taking a GLP-1 Agonist:no    Have you attached a patient to this message: yes   "

## 2023-08-22 NOTE — TELEPHONE ENCOUNTER
Spoke to spouse to schedule procedure(s) Colonoscopy/EGD       Physician to perform procedure(s) Dr. EBONY Prince  Date of Procedure (s) 11/29/23  Arrival Time 7:15 AM  Time of Procedure(s) 8:15 AM   Location of Procedure(s) [place] 2nd Floor  Type of Rx Prep sent to patient: Miralax extended  Instructions provided to patient via [facility]    Patient was informed on the following information and verbalized understanding. Screening questionnaire reviewed with patient and complete. If procedure requires anesthesia, a responsible adult needs to be present to accompany the patient home, patient cannot drive after receiving anesthesia. Appointment details are tentative, especially check-in time. Patient will receive a prep-op call 4 days prior to confirm check-in time for procedure. If applicable the patient should contact their pharmacy to verify Rx for procedure prep is ready for pick-up. Patient was advised to call the scheduling department at [phone] if pharmacy states no Rx is available. Patient was advised to call the endoscopy scheduling department if any questions or concerns arise.      [facility] Scheduling Department

## 2023-08-23 ENCOUNTER — TELEPHONE (OUTPATIENT)
Dept: ENDOSCOPY | Facility: HOSPITAL | Age: 58
End: 2023-08-23

## 2023-09-18 DIAGNOSIS — I10 ESSENTIAL HYPERTENSION: ICD-10-CM

## 2023-09-18 RX ORDER — TRIAMTERENE/HYDROCHLOROTHIAZID 37.5-25 MG
1 TABLET ORAL DAILY
Qty: 90 TABLET | Refills: 3 | Status: SHIPPED | OUTPATIENT
Start: 2023-09-18 | End: 2024-09-17

## 2023-10-13 ENCOUNTER — OFFICE VISIT (OUTPATIENT)
Dept: FAMILY MEDICINE | Facility: CLINIC | Age: 58
End: 2023-10-13
Payer: COMMERCIAL

## 2023-10-13 VITALS
HEART RATE: 100 BPM | SYSTOLIC BLOOD PRESSURE: 124 MMHG | OXYGEN SATURATION: 97 % | HEIGHT: 69 IN | TEMPERATURE: 98 F | DIASTOLIC BLOOD PRESSURE: 64 MMHG | WEIGHT: 274.06 LBS | BODY MASS INDEX: 40.59 KG/M2

## 2023-10-13 DIAGNOSIS — K21.9 CHRONIC GERD: ICD-10-CM

## 2023-10-13 DIAGNOSIS — Z23 FLU VACCINE NEED: ICD-10-CM

## 2023-10-13 DIAGNOSIS — F90.0 ADHD (ATTENTION DEFICIT HYPERACTIVITY DISORDER), INATTENTIVE TYPE: ICD-10-CM

## 2023-10-13 DIAGNOSIS — I10 ESSENTIAL HYPERTENSION: ICD-10-CM

## 2023-10-13 DIAGNOSIS — Z72.0 TOBACCO USER: ICD-10-CM

## 2023-10-13 DIAGNOSIS — Z00.00 ANNUAL PHYSICAL EXAM: Primary | ICD-10-CM

## 2023-10-13 DIAGNOSIS — E78.2 MIXED HYPERLIPIDEMIA: ICD-10-CM

## 2023-10-13 DIAGNOSIS — E66.01 CLASS 3 SEVERE OBESITY DUE TO EXCESS CALORIES WITH SERIOUS COMORBIDITY AND BODY MASS INDEX (BMI) OF 40.0 TO 44.9 IN ADULT: ICD-10-CM

## 2023-10-13 DIAGNOSIS — R73.01 IFG (IMPAIRED FASTING GLUCOSE): ICD-10-CM

## 2023-10-13 DIAGNOSIS — R74.8 ELEVATED LIVER ENZYMES: ICD-10-CM

## 2023-10-13 PROBLEM — E66.813 CLASS 3 SEVERE OBESITY DUE TO EXCESS CALORIES WITH SERIOUS COMORBIDITY AND BODY MASS INDEX (BMI) OF 40.0 TO 44.9 IN ADULT: Status: ACTIVE | Noted: 2019-03-15

## 2023-10-13 PROCEDURE — 3044F PR MOST RECENT HEMOGLOBIN A1C LEVEL <7.0%: ICD-10-PCS | Mod: CPTII,S$GLB,, | Performed by: NURSE PRACTITIONER

## 2023-10-13 PROCEDURE — G0008 ADMIN INFLUENZA VIRUS VAC: HCPCS | Mod: S$GLB,,, | Performed by: NURSE PRACTITIONER

## 2023-10-13 PROCEDURE — 1160F PR REVIEW ALL MEDS BY PRESCRIBER/CLIN PHARMACIST DOCUMENTED: ICD-10-PCS | Mod: CPTII,S$GLB,, | Performed by: NURSE PRACTITIONER

## 2023-10-13 PROCEDURE — G0008 FLU VACCINE (QUAD) GREATER THAN OR EQUAL TO 3YO PRESERVATIVE FREE IM: ICD-10-PCS | Mod: S$GLB,,, | Performed by: NURSE PRACTITIONER

## 2023-10-13 PROCEDURE — 90686 IIV4 VACC NO PRSV 0.5 ML IM: CPT | Mod: S$GLB,,, | Performed by: NURSE PRACTITIONER

## 2023-10-13 PROCEDURE — 3078F DIAST BP <80 MM HG: CPT | Mod: CPTII,S$GLB,, | Performed by: NURSE PRACTITIONER

## 2023-10-13 PROCEDURE — 99396 PREV VISIT EST AGE 40-64: CPT | Mod: S$GLB,,, | Performed by: NURSE PRACTITIONER

## 2023-10-13 PROCEDURE — 99999 PR PBB SHADOW E&M-EST. PATIENT-LVL IV: CPT | Mod: PBBFAC,,, | Performed by: NURSE PRACTITIONER

## 2023-10-13 PROCEDURE — 99396 PR PREVENTIVE VISIT,EST,40-64: ICD-10-PCS | Mod: S$GLB,,, | Performed by: NURSE PRACTITIONER

## 2023-10-13 PROCEDURE — 3074F SYST BP LT 130 MM HG: CPT | Mod: CPTII,S$GLB,, | Performed by: NURSE PRACTITIONER

## 2023-10-13 PROCEDURE — 90686 FLU VACCINE (QUAD) GREATER THAN OR EQUAL TO 3YO PRESERVATIVE FREE IM: ICD-10-PCS | Mod: S$GLB,,, | Performed by: NURSE PRACTITIONER

## 2023-10-13 PROCEDURE — 4010F ACE/ARB THERAPY RXD/TAKEN: CPT | Mod: CPTII,S$GLB,, | Performed by: NURSE PRACTITIONER

## 2023-10-13 PROCEDURE — 1160F RVW MEDS BY RX/DR IN RCRD: CPT | Mod: CPTII,S$GLB,, | Performed by: NURSE PRACTITIONER

## 2023-10-13 PROCEDURE — 3044F HG A1C LEVEL LT 7.0%: CPT | Mod: CPTII,S$GLB,, | Performed by: NURSE PRACTITIONER

## 2023-10-13 PROCEDURE — 3074F PR MOST RECENT SYSTOLIC BLOOD PRESSURE < 130 MM HG: ICD-10-PCS | Mod: CPTII,S$GLB,, | Performed by: NURSE PRACTITIONER

## 2023-10-13 PROCEDURE — 4010F PR ACE/ARB THEARPY RXD/TAKEN: ICD-10-PCS | Mod: CPTII,S$GLB,, | Performed by: NURSE PRACTITIONER

## 2023-10-13 PROCEDURE — 3008F PR BODY MASS INDEX (BMI) DOCUMENTED: ICD-10-PCS | Mod: CPTII,S$GLB,, | Performed by: NURSE PRACTITIONER

## 2023-10-13 PROCEDURE — 1159F MED LIST DOCD IN RCRD: CPT | Mod: CPTII,S$GLB,, | Performed by: NURSE PRACTITIONER

## 2023-10-13 PROCEDURE — 99999 PR PBB SHADOW E&M-EST. PATIENT-LVL IV: ICD-10-PCS | Mod: PBBFAC,,, | Performed by: NURSE PRACTITIONER

## 2023-10-13 PROCEDURE — 1159F PR MEDICATION LIST DOCUMENTED IN MEDICAL RECORD: ICD-10-PCS | Mod: CPTII,S$GLB,, | Performed by: NURSE PRACTITIONER

## 2023-10-13 PROCEDURE — 3078F PR MOST RECENT DIASTOLIC BLOOD PRESSURE < 80 MM HG: ICD-10-PCS | Mod: CPTII,S$GLB,, | Performed by: NURSE PRACTITIONER

## 2023-10-13 PROCEDURE — 3008F BODY MASS INDEX DOCD: CPT | Mod: CPTII,S$GLB,, | Performed by: NURSE PRACTITIONER

## 2023-10-13 RX ORDER — ATORVASTATIN CALCIUM 10 MG/1
10 TABLET, FILM COATED ORAL DAILY
Qty: 90 TABLET | Refills: 0 | Status: SHIPPED | OUTPATIENT
Start: 2023-10-13 | End: 2023-12-14 | Stop reason: SDUPTHER

## 2023-10-13 NOTE — PROGRESS NOTES
"Subjective:       Patient ID: Nick Mcdonough Sr. is a 58 y.o. male.    Chief Complaint: Annual Exam    HPI  Patient is a 58-year-old white male with Hypertension, Hyperlipidemia, chronic GERD, personal history of colon polyps, ADHD, tobacco user, fluid retention to lower extremities with venous stasis dermatitis, and obesity that is here today for medication refill. Last wellness exam in August 2022. here today for ANNUAL WELLNESS EXAM WITH FASTING LABS     Hypertension  Currently taking Lisinopril 40 mg daily, Maxzide 25 mg daily, Doxazosin 8 mg daily and Amlodipine 10 mg daily.   Blood pressure is well controlled on these medications with no fluid retention.  /64 (BP Location: Right arm, Patient Position: Sitting, BP Method: Large (Manual))   Pulse 100   Temp 97.9 °F (36.6 °C) (Oral)   Ht 5' 9" (1.753 m)   Wt 124.3 kg (274 lb 0.5 oz)   SpO2 97%   BMI 40.47 kg/m²           Hyperlipidemia  Intolerance to Rosuvastatin due to acute itching  On Pravastatin 2019 - June 2020 - triglycerides too high  Change to Atorvastatin June 2020 and levels in October 2020 much improved.  But in June 2021 - triglycerides again high and LDL running low 30s and so tried change to Zetia 10 mg daily  Initial recheck in August 2022 improved but now LDL increasing and liver enzymes high  CHANGE BACK TO ATORVASTATIN 10 mg daily.  Recheck in 3 months.          Obese  Body mass index is 40.47 kg/m².         ADHD   stable on current regimen Adderall XR 30 mg 2 capsules daily.    Patient is able to focus and complete tasks effectively.  No side effects reported.   Patient has intolerance to GENERIC Adderall XR in the past due to ineffectiveness and crash effect but has been taking the BRAND Adderall XR 30 mg 2 capsule daily since prior to 2017 without issue.     Chronic GERD  Patient reports on PPI for multiple years  States he never had upper EGD  Reports Nexium 40 mg no longer covered - has to change to Omeprazole  Taking " Omeprazole 40 mg daily  saw GI for EGD 8/21/23  EGD colonoscopy scheduled for 11/29/23     Personal History of Colon Polyps  Colonoscopy 2/2/2018 with Dr. Medley   EGD colonoscopy scheduled for 11/29/23     Current smoker   for over 30 years - 1ppd smoker.    States he started smoking around age 21.    He declines the smoking cessation program.    CT lung screen up to date 2/17/2023      IFG   with HgbA1C 5.6%  No history of elevations  Work on diet and recheck in 3 months.    Elevated liver enzymes  AST 81 and  = no history of elevations  No recent changes in medication  No recent illness reported  Will monitor and recheck in 3 months.        Wellness labs:  CBC okay  CMP with  and elevated liver enzymes - see notes above.  Cholesterol - see note above  TSH WNL  PSA WNL    Health Maintenance:  EGD and Colonoscopy scheduled.    Component      Latest Ref Rng 6/19/2021 9/18/2021 8/6/2022 9/22/2023   WBC      3.90 - 12.70 K/uL 7.23   8.36  8.15    RBC      4.60 - 6.20 M/uL 4.40 (L)   4.54 (L)  4.59 (L)    Hemoglobin      14.0 - 18.0 g/dL 14.3   14.6  14.7    Hematocrit      40.0 - 54.0 % 41.8   42.8  43.4    MCV      82 - 98 fL 95   94  95    MCH      27.0 - 31.0 pg 32.5 (H)   32.2 (H)  32.0 (H)    MCHC      32.0 - 36.0 g/dL 34.2   34.1  33.9    RDW      11.5 - 14.5 % 14.0   14.6 (H)  13.9    Platelet Count      150 - 450 K/uL 273   233  244    MPV      9.2 - 12.9 fL 11.5   11.1  11.7    Gran # (ANC)      1.8 - 7.7 K/uL 4.1   4.9  4.5    Lymph #      1.0 - 4.8 K/uL 2.0   2.2  2.5    Mono #      0.3 - 1.0 K/uL 0.7   0.9  0.7    Eos #      0.0 - 0.5 K/uL 0.4   0.3  0.3    Baso #      0.00 - 0.20 K/uL 0.07   0.06  0.07    Gran %      38.0 - 73.0 % 56.1   58.0  54.9    Lymph %      18.0 - 48.0 % 27.2   26.6  31.2    Mono %      4.0 - 15.0 % 9.5   10.2  9.1    Eosinophil %      0.0 - 8.0 % 5.5   3.9  3.3    Basophil %      0.0 - 1.9 % 1.0   0.7  0.9    Differential Method Automated   Automated   Automated    Sodium      136 - 145 mmol/L 142  138  141  140    Potassium      3.5 - 5.1 mmol/L 3.7  3.6  3.9  3.3 (L)    Chloride      95 - 110 mmol/L 105  107  99  102    CO2      23 - 29 mmol/L 26  26  30 (H)  28    Glucose      70 - 110 mg/dL 106  108  93  120 (H)    BUN      2 - 20 mg/dL 23 (H)  34 (H)  18  18    Creatinine      0.50 - 1.40 mg/dL 0.95  0.82  0.91  0.89    Calcium      8.7 - 10.5 mg/dL 9.1  9.0  9.4  8.7    PROTEIN TOTAL      6.0 - 8.4 g/dL 7.1  6.8  7.4  7.5    Albumin      3.5 - 5.2 g/dL 4.3  4.2  4.7  4.1    BILIRUBIN TOTAL      0.1 - 1.0 mg/dL 0.4  0.3  1.1 (H)  0.6    ALP      38 - 126 U/L 88  95  75  95    AST      15 - 46 U/L 33  41  41  81 (H)    ALT      10 - 44 U/L 29  39  38  120 (H)    Anion Gap      8 - 16 mmol/L 11  5 (L)  12  10    Cholesterol Total      120 - 199 mg/dL 111 (L)  133  95 (L)  151    Triglycerides      30 - 150 mg/dL 233 (H)  192 (H)  94  159 (H)    HDL      40 - 75 mg/dL 25 (L)  26 (L)  39 (L)  26 (L)    LDL Cholesterol External      63.0 - 159.0 mg/dL 39.4 (L)  68.6  37.2 (L)  93.2    HDL/Cholesterol Ratio      20.0 - 50.0 % 22.5  19.5 (L)  41.1  17.2 (L)    Total Cholesterol/HDL Ratio      2.0 - 5.0  4.4  5.1 (H)  2.4  5.8 (H)    Non-HDL Cholesterol      mg/dL 86  107  56  125    TSH      0.400 - 4.000 uIU/mL 2.530   2.860  3.060    Prostate Specific Antigen      0.00 - 4.00 ng/mL 0.79   1.0  0.67    Immature Granulocytes      0.0 - 0.5 % 0.7 (H)   0.6 (H)  0.6 (H)    Immature Grans (Abs)      0.00 - 0.04 K/uL 0.05 (H)   0.05 (H)  0.05 (H)    nRBC      0 /100 WBC 0   0  0    eGFR      >60 mL/min/1.73 m^2   >60.0  >60.0    Hemoglobin A1C External      4.0 - 5.6 %   5.4  5.6    Estimated Avg Glucose      68 - 131 mg/dL   108  114         Review of Systems   All other systems reviewed and are negative.        Objective:     Vitals:    10/13/23 1357   BP: 124/64   BP Location: Right arm   Patient Position: Sitting   BP Method: Large (Manual)   Pulse: 100   Temp: 97.9  "°F (36.6 °C)   TempSrc: Oral   SpO2: 97%   Weight: 124.3 kg (274 lb 0.5 oz)   Height: 5' 9" (1.753 m)          Physical Exam  Constitutional:       General: He is not in acute distress.     Appearance: He is well-developed. He is obese. He is not ill-appearing, toxic-appearing or diaphoretic.      Comments: + obesity. Body mass index is 40.47 kg/m².           HENT:      Head: Normocephalic and atraumatic.      Right Ear: External ear normal.      Left Ear: External ear normal.   Eyes:      General: No scleral icterus.        Right eye: No discharge.         Left eye: No discharge.      Extraocular Movements: Extraocular movements intact.      Conjunctiva/sclera: Conjunctivae normal.      Pupils: Pupils are equal, round, and reactive to light.   Neck:      Thyroid: No thyromegaly.      Vascular: No JVD.      Trachea: No tracheal deviation.   Cardiovascular:      Rate and Rhythm: Normal rate and regular rhythm.      Heart sounds: Normal heart sounds. No murmur heard.  Pulmonary:      Effort: Pulmonary effort is normal. No respiratory distress.      Breath sounds: Normal breath sounds. No stridor. No wheezing or rhonchi.   Abdominal:      General: There is no distension.   Musculoskeletal:         General: Normal range of motion.      Cervical back: Normal range of motion and neck supple.   Lymphadenopathy:      Cervical: No cervical adenopathy.   Skin:     General: Skin is warm and dry.      Findings: No rash.   Neurological:      Mental Status: He is alert and oriented to person, place, and time.      Coordination: Coordination normal.   Psychiatric:         Mood and Affect: Mood normal.         Behavior: Behavior normal.         Thought Content: Thought content normal.         Judgment: Judgment normal.           Assessment:         ICD-10-CM ICD-9-CM   1. Annual physical exam  Z00.00 V70.0   2. Essential hypertension  I10 401.9   3. Class 3 severe obesity due to excess calories with serious comorbidity and body " mass index (BMI) of 40.0 to 44.9 in adult  E66.01 278.01    Z68.41 V85.41   4. Mixed hyperlipidemia  E78.2 272.2   5. ADHD (attention deficit hyperactivity disorder), inattentive type  F90.0 314.00   6. Chronic GERD  K21.9 530.81   7. IFG (impaired fasting glucose)  R73.01 790.21   8. Elevated liver enzymes  R74.8 790.5   9. Flu vaccine need  Z23 V04.81   10. Tobacco user  Z72.0 305.1       Plan:       Annual physical exam  Health Maintenance Summary     Full History      Expand All  Collapse All    Postponed - Colorectal Cancer Screening  (Colonoscopy - Every 5 Years)  Postponed until 12/6/2023 02/02/2018  Colonoscopy   02/02/2018  Surgical Procedure: COLONOSCOPY     Postponed - COVID-19 Vaccine  (4 - 2023-24 season)  Postponed until 10/13/2024  12/05/2021  Imm Admin: COVID-19, MRNA, LN-S, PF (Pfizer) (Purple Cap)   04/09/2021  Imm Admin: COVID-19, MRNA, LN-S, PF (Pfizer) (Purple Cap)   03/19/2021  Imm Admin: COVID-19, MRNA, LN-S, PF (Pfizer) (Purple Cap)     LDCT Lung Screen  (Yearly)  Next due on 2/17/2024 02/17/2023  CT Chest Lung Screening Low Dose   01/13/2022  CT Chest Lung Screening Low Dose     PROSTATE-SPECIFIC ANTIGEN  (Yearly)  Next due on 9/22/2024 09/22/2023  PSA, Screening   08/06/2022  PSA, Screening   06/19/2021  PSA, Screening   06/20/2020  PSA, Screening   03/09/2019  PSA, Screening   View More History     Scheduled - Hemoglobin A1c (Prediabetes)  (Yearly)  Scheduled for 1/12/2024 09/22/2023  Hemoglobin A1C External component of Hemoglobin A1C   08/06/2022  Hemoglobin A1C External component of Hemoglobin A1C     TETANUS VACCINE  (Every 10 Years)  Next due on 7/30/2027 07/30/2017  Imm Admin: Tdap   09/22/2005  Imm Admin: Td (ADULT)     Scheduled - Lipid Panel  (Every 5 Years)  Scheduled for 1/12/2024 09/22/2023  Cholesterol Total component of Lipid Panel   08/06/2022  Cholesterol Total component of Lipid Panel   09/18/2021  Cholesterol Total component of Lipid Panel   06/19/2021  Cholesterol  Total component of Lipid Panel   10/03/2020  Cholesterol Total component of Lipid Panel   View More History     HIV Screening  Completed  06/20/2020  HIV 1/2 Ag/Ab (4th Gen)     Hepatitis C Screening  Completed  06/19/2021  Hepatitis C Ab component of Hepatitis C Antibody     Pneumococcal Vaccines (Age 0-64)  (Series Information)  Completed  11/08/2022  Imm Admin: Pneumococcal Conjugate - 20 Valent   03/12/2018  Imm Admin: Pneumococcal Polysaccharide - 23 Valent     Shingles Vaccine  (Series Information)  Completed  08/03/2023  Imm Admin: Zoster Recombinant   05/05/2023  Imm Admin: Zoster Recombinant     Influenza Vaccine  (Series Information)  Completed  10/13/2023  Imm Admin: Influenza - Quadrivalent - PF *Preferred* (6 months and older)   11/08/2022  Imm Admin: Influenza - Quadrivalent - PF *Preferred* (6 months and older)   12/30/2021  Imm Admin: Influenza - Quadrivalent - PF *Preferred* (6 months and older)   10/13/2020  Imm Admin: Influenza - Quadrivalent - PF *Preferred* (6 months and older)   10/02/2019  Imm Admin: Influenza - Quadrivalent - PF *Preferred* (6 months and older)   View More History         Essential hypertension  Stable on current meds      Class 3 severe obesity due to excess calories with serious comorbidity and body mass index (BMI) of 40.0 to 44.9 in adult  Work on diet    Mixed hyperlipidemia  Stop the Zetia and change back to Atorvastatin 10 mg daily  Recheck in 3 months - if triglycerides elevate - will add on fenofibrate.  -     atorvastatin (LIPITOR) 10 MG tablet; Take 1 tablet (10 mg total) by mouth once daily.  Dispense: 90 tablet; Refill: 0  -     Lipid Panel; Future; Expected date: 10/13/2023    ADHD (attention deficit hyperactivity disorder), inattentive type  Stable - will send refill request when needed.    Chronic GERD  On PPI  Has EGD scheduled.    IFG (impaired fasting glucose)  Work on diet and recheck in 3 months.  -     Comprehensive Metabolic Panel; Future; Expected  date: 10/13/2023  -     Hemoglobin A1C; Future; Expected date: 10/13/2023    Elevated liver enzymes  Avoid tylenol and alcohol  Recheck in 3 months.    Flu vaccine need  -     Influenza - Quadrivalent *Preferred* (6 months+) (PF)    Tobacco user      Follow up in about 3 months (around 1/13/2024) for fasting labs and follow up.     Patient's Medications   New Prescriptions    ATORVASTATIN (LIPITOR) 10 MG TABLET    Take 1 tablet (10 mg total) by mouth once daily.   Previous Medications    ADDERALL XR 30 MG 24 HR CAPSULE    Take 2 capsules (60 mg total) by mouth every morning. BRAND ONLY    AMLODIPINE (NORVASC) 10 MG TABLET    Take 1 tablet (10 mg total) by mouth once daily.    DOXAZOSIN (CARDURA) 8 MG TAB    Take 1 tablet (8 mg total) by mouth once daily.    FISH OIL-OMEGA-3 FATTY ACIDS 300-1,000 MG CAPSULE    Take 1 g by mouth once daily.    LISINOPRIL (PRINIVIL,ZESTRIL) 40 MG TABLET    Take 1 tablet (40 mg total) by mouth once daily.    NIACIN 500 MG TAB    Take 1 tablet (500 mg total) by mouth every evening.    OMEPRAZOLE (PRILOSEC) 40 MG CAPSULE    Take 1 capsule (40 mg total) by mouth once daily.    TRIAMTERENE-HYDROCHLOROTHIAZIDE 37.5-25 MG (MAXZIDE-25) 37.5-25 MG PER TABLET    Take 1 tablet by mouth once daily.   Modified Medications    No medications on file   Discontinued Medications    EZETIMIBE (ZETIA) 10 MG TABLET    Take 1 tablet (10 mg total) by mouth once daily.       Past Medical History:   Diagnosis Date    Adult ADHD     Alcohol dependency 9/2015    last alcohol intake September 2015    Colon polyp 02/02/2018    2 polyps - tubular adenoma - repeat colonoscopy in 5 years    GERD (gastroesophageal reflux disease)     Hyperlipidemia     Hypertension        Past Surgical History:   Procedure Laterality Date    COLONOSCOPY N/A 2/2/2018    Procedure: COLONOSCOPY;  Surgeon: Eduard Medley MD;  Location: [facility];  Service: Endoscopy;  Laterality: N/A;    HERNIA REPAIR      INGUINAL HERNIA REPAIR Left  2012    done at [facility]    KNEE SURGERY Right     VASECTOMY         Family History   Problem Relation Age of Onset    Hypertension Father     Arthritis Mother     Depression Mother     Drug abuse Mother     Hyperlipidemia Mother     Hypertension Mother     Mental illness Mother     Heart disease Brother         acute MI -  at at 46    Alcohol abuse Brother     Early death Brother     Mental illness Brother     Cancer Maternal Grandmother     No Known Problems Maternal Grandfather     Heart disease Paternal Grandmother     Alcohol abuse Paternal Grandfather        Social History     Socioeconomic History    Marital status:    Occupational History    Occupation:    Tobacco Use    Smoking status: Every Day     Current packs/day: 1.00     Average packs/day: 1 pack/day for 35.0 years (35.0 ttl pk-yrs)     Types: Cigarettes    Smokeless tobacco: Never   Substance and Sexual Activity    Alcohol use: Yes     Alcohol/week: 8.0 standard drinks of alcohol     Types: 8 Cans of beer per week     Comment: every weekend - a couple beers per weekend night    Drug use: No    Sexual activity: Yes     Partners: Female     Social Determinants of Health     Financial Resource Strain: Low Risk  (2023)    Overall Financial Resource Strain (CARDIA)     Difficulty of Paying Living Expenses: Not hard at all   Food Insecurity: No Food Insecurity (2023)    Hunger Vital Sign     Worried About Running Out of Food in the Last Year: Never true     Ran Out of Food in the Last Year: Never true   Transportation Needs: No Transportation Needs (2023)    PRAPARE - Transportation     Lack of Transportation (Medical): No     Lack of Transportation (Non-Medical): No   Physical Activity: Insufficiently Active (2023)    Exercise Vital Sign     Days of Exercise per Week: 2 days     Minutes of Exercise per Session: 10 min   Stress: No Stress Concern Present (2023)    [employer] [place] [employer]  - Occupational Stress Questionnaire     Feeling of Stress : Only a little   Social Connections: Unknown (8/21/2023)    Social Connection and Isolation Panel [NHANES]     Frequency of Communication with Friends and Family: Three times a week     Frequency of Social Gatherings with Friends and Family: Once a week     Active Member of Clubs or Organizations: No     Attends Club or Organization Meetings: Never     Marital Status:    Housing Stability: Low Risk  (8/21/2023)    Housing Stability Vital Sign     Unable to Pay for Housing in the Last Year: No     Number of Places Lived in the Last Year: 1     Unstable Housing in the Last Year: No

## 2023-10-24 ENCOUNTER — PATIENT MESSAGE (OUTPATIENT)
Dept: FAMILY MEDICINE | Facility: CLINIC | Age: 58
End: 2023-10-24

## 2023-10-24 DIAGNOSIS — F90.0 ADHD (ATTENTION DEFICIT HYPERACTIVITY DISORDER), INATTENTIVE TYPE: ICD-10-CM

## 2023-10-24 RX ORDER — DEXTROAMPHETAMINE SULFATE, DEXTROAMPHETAMINE SACCHARATE, AMPHETAMINE SULFATE AND AMPHETAMINE ASPARTATE 7.5; 7.5; 7.5; 7.5 MG/1; MG/1; MG/1; MG/1
60 CAPSULE, EXTENDED RELEASE ORAL EVERY MORNING
Qty: 180 CAPSULE | Refills: 0 | Status: SHIPPED | OUTPATIENT
Start: 2023-10-24 | End: 2024-01-19 | Stop reason: SDUPTHER

## 2023-11-28 ENCOUNTER — ANESTHESIA EVENT (OUTPATIENT)
Dept: ENDOSCOPY | Facility: HOSPITAL | Age: 58
End: 2023-11-28
Payer: COMMERCIAL

## 2023-11-29 ENCOUNTER — HOSPITAL ENCOUNTER (OUTPATIENT)
Facility: HOSPITAL | Age: 58
Discharge: HOME | End: 2023-11-29
Attending: INTERNAL MEDICINE | Admitting: INTERNAL MEDICINE
Payer: COMMERCIAL

## 2023-11-29 ENCOUNTER — ANESTHESIA (OUTPATIENT)
Dept: ENDOSCOPY | Facility: HOSPITAL | Age: 58
End: 2023-11-29
Payer: COMMERCIAL

## 2023-11-29 VITALS
HEIGHT: 69 IN | BODY MASS INDEX: 36.29 KG/M2 | WEIGHT: 245 LBS | HEART RATE: 59 BPM | DIASTOLIC BLOOD PRESSURE: 73 MMHG | RESPIRATION RATE: 17 BRPM | SYSTOLIC BLOOD PRESSURE: 114 MMHG | TEMPERATURE: 98 F | OXYGEN SATURATION: 98 %

## 2023-11-29 DIAGNOSIS — K21.9 GASTROESOPHAGEAL REFLUX DISEASE, UNSPECIFIED WHETHER ESOPHAGITIS PRESENT: ICD-10-CM

## 2023-11-29 DIAGNOSIS — Z86.010 HISTORY OF COLON POLYPS: Primary | ICD-10-CM

## 2023-11-29 DIAGNOSIS — Z12.11 ENCOUNTER FOR COLONOSCOPY DUE TO HISTORY OF COLONIC POLYP: ICD-10-CM

## 2023-11-29 DIAGNOSIS — Z86.010 ENCOUNTER FOR COLONOSCOPY DUE TO HISTORY OF COLONIC POLYP: ICD-10-CM

## 2023-11-29 PROCEDURE — 45380 COLONOSCOPY AND BIOPSY: CPT | Mod: 33,59,, | Performed by: INTERNAL MEDICINE

## 2023-11-29 PROCEDURE — 45385 COLONOSCOPY W/LESION REMOVAL: CPT | Mod: 33,,, | Performed by: INTERNAL MEDICINE

## 2023-11-29 PROCEDURE — 45385 PR COLONOSCOPY,REMV LESN,SNARE: ICD-10-PCS | Mod: 33,,, | Performed by: INTERNAL MEDICINE

## 2023-11-29 PROCEDURE — 45380 PR COLONOSCOPY,BIOPSY: ICD-10-PCS | Mod: 33,59,, | Performed by: INTERNAL MEDICINE

## 2023-11-29 PROCEDURE — 27201012 HC FORCEPS, HOT/COLD, DISP: Performed by: INTERNAL MEDICINE

## 2023-11-29 PROCEDURE — 37000008 HC ANESTHESIA 1ST 15 MINUTES: Performed by: INTERNAL MEDICINE

## 2023-11-29 PROCEDURE — 45385 COLONOSCOPY W/LESION REMOVAL: CPT | Mod: PT | Performed by: INTERNAL MEDICINE

## 2023-11-29 PROCEDURE — 27201089 HC SNARE, DISP (ANY): Performed by: INTERNAL MEDICINE

## 2023-11-29 PROCEDURE — 88305 TISSUE EXAM BY PATHOLOGIST: ICD-10-PCS | Mod: 26,,, | Performed by: STUDENT IN AN ORGANIZED HEALTH CARE EDUCATION/TRAINING PROGRAM

## 2023-11-29 PROCEDURE — 88305 TISSUE EXAM BY PATHOLOGIST: CPT | Performed by: STUDENT IN AN ORGANIZED HEALTH CARE EDUCATION/TRAINING PROGRAM

## 2023-11-29 PROCEDURE — 43239 EGD BIOPSY SINGLE/MULTIPLE: CPT | Mod: 51,,, | Performed by: INTERNAL MEDICINE

## 2023-11-29 PROCEDURE — D9220A PRA ANESTHESIA: Mod: 33,ANES,, | Performed by: ANESTHESIOLOGY

## 2023-11-29 PROCEDURE — 88342 IMHCHEM/IMCYTCHM 1ST ANTB: CPT | Mod: 26,,, | Performed by: STUDENT IN AN ORGANIZED HEALTH CARE EDUCATION/TRAINING PROGRAM

## 2023-11-29 PROCEDURE — D9220A PRA ANESTHESIA: ICD-10-PCS | Mod: 33,CRNA,, | Performed by: NURSE ANESTHETIST, CERTIFIED REGISTERED

## 2023-11-29 PROCEDURE — D9220A PRA ANESTHESIA: ICD-10-PCS | Mod: 33,ANES,, | Performed by: ANESTHESIOLOGY

## 2023-11-29 PROCEDURE — D9220A PRA ANESTHESIA: Mod: 33,CRNA,, | Performed by: NURSE ANESTHETIST, CERTIFIED REGISTERED

## 2023-11-29 PROCEDURE — 25000003 PHARM REV CODE 250: Performed by: NURSE ANESTHETIST, CERTIFIED REGISTERED

## 2023-11-29 PROCEDURE — 45380 COLONOSCOPY AND BIOPSY: CPT | Mod: PT,59 | Performed by: INTERNAL MEDICINE

## 2023-11-29 PROCEDURE — 43239 PR EGD, FLEX, W/BIOPSY, SGL/MULTI: ICD-10-PCS | Mod: 51,,, | Performed by: INTERNAL MEDICINE

## 2023-11-29 PROCEDURE — 88305 TISSUE EXAM BY PATHOLOGIST: CPT | Mod: 26,,, | Performed by: STUDENT IN AN ORGANIZED HEALTH CARE EDUCATION/TRAINING PROGRAM

## 2023-11-29 PROCEDURE — 88342 CHG IMMUNOCYTOCHEMISTRY: ICD-10-PCS | Mod: 26,,, | Performed by: STUDENT IN AN ORGANIZED HEALTH CARE EDUCATION/TRAINING PROGRAM

## 2023-11-29 PROCEDURE — 43239 EGD BIOPSY SINGLE/MULTIPLE: CPT | Performed by: INTERNAL MEDICINE

## 2023-11-29 PROCEDURE — 63600175 PHARM REV CODE 636 W HCPCS: Performed by: NURSE ANESTHETIST, CERTIFIED REGISTERED

## 2023-11-29 PROCEDURE — 37000009 HC ANESTHESIA EA ADD 15 MINS: Performed by: INTERNAL MEDICINE

## 2023-11-29 PROCEDURE — 88342 IMHCHEM/IMCYTCHM 1ST ANTB: CPT | Performed by: STUDENT IN AN ORGANIZED HEALTH CARE EDUCATION/TRAINING PROGRAM

## 2023-11-29 RX ORDER — PROPOFOL 10 MG/ML
VIAL (ML) INTRAVENOUS
Status: DISCONTINUED | OUTPATIENT
Start: 2023-11-29 | End: 2023-11-29

## 2023-11-29 RX ORDER — LIDOCAINE HYDROCHLORIDE 20 MG/ML
INJECTION INTRAVENOUS
Status: DISCONTINUED | OUTPATIENT
Start: 2023-11-29 | End: 2023-11-29

## 2023-11-29 RX ORDER — DEXMEDETOMIDINE HYDROCHLORIDE 100 UG/ML
INJECTION, SOLUTION INTRAVENOUS
Status: DISCONTINUED | OUTPATIENT
Start: 2023-11-29 | End: 2023-11-29

## 2023-11-29 RX ORDER — PHENYLEPHRINE HYDROCHLORIDE 10 MG/ML
INJECTION INTRAVENOUS
Status: DISCONTINUED | OUTPATIENT
Start: 2023-11-29 | End: 2023-11-29

## 2023-11-29 RX ORDER — SODIUM CHLORIDE 0.9 % (FLUSH) 0.9 %
3 SYRINGE (ML) INJECTION
Status: DISCONTINUED | OUTPATIENT
Start: 2023-11-29 | End: 2023-11-29 | Stop reason: HOSPADM

## 2023-11-29 RX ADMIN — PROPOFOL 120 MG: 10 INJECTION, EMULSION INTRAVENOUS at 08:11

## 2023-11-29 RX ADMIN — SODIUM CHLORIDE: 0.9 INJECTION, SOLUTION INTRAVENOUS at 08:11

## 2023-11-29 RX ADMIN — PROPOFOL 60 MG: 10 INJECTION, EMULSION INTRAVENOUS at 08:11

## 2023-11-29 RX ADMIN — LIDOCAINE HYDROCHLORIDE 100 MG: 20 INJECTION INTRAVENOUS at 08:11

## 2023-11-29 RX ADMIN — DEXMEDETOMIDINE 8 MCG: 100 INJECTION, SOLUTION, CONCENTRATE INTRAVENOUS at 08:11

## 2023-11-29 RX ADMIN — PHENYLEPHRINE HYDROCHLORIDE 200 MCG: 10 INJECTION INTRAVENOUS at 08:11

## 2023-11-29 RX ADMIN — PHENYLEPHRINE HYDROCHLORIDE 200 MCG: 10 INJECTION INTRAVENOUS at 09:11

## 2023-11-29 RX ADMIN — SODIUM CHLORIDE, SODIUM ACETATE ANHYDROUS, SODIUM GLUCONATE, POTASSIUM CHLORIDE, AND MAGNESIUM CHLORIDE: 526; 222; 502; 37; 30 INJECTION, SOLUTION INTRAVENOUS at 08:11

## 2023-11-29 RX ADMIN — DEXMEDETOMIDINE 4 MCG: 100 INJECTION, SOLUTION, CONCENTRATE INTRAVENOUS at 08:11

## 2023-11-29 NOTE — ANESTHESIA PREPROCEDURE EVALUATION
11/29/2023  Nick Mcdonough Sr. is a 58 y.o., male.      Pre-op Assessment    I have reviewed the Patient Summary Reports.       I have reviewed the Medications.     Review of Systems  Anesthesia Hx:   History of prior surgery of interest to airway management or planning:            Denies Personal Hx of Anesthesia complications.                    Social:  Alcohol Use       Cardiovascular:     Hypertension           hyperlipidemia                             Hepatic/GI:     GERD             Psych:  Psychiatric History                  Physical Exam  General: Well nourished    Airway:  Mallampati: III / II  Mouth Opening: Normal  TM Distance: Normal  Tongue: Normal  Neck ROM: Normal ROM    Chest/Lungs:  Normal Respiratory Rate    Heart:  Rate: Normal        Anesthesia Plan  Type of Anesthesia, risks & benefits discussed:    Anesthesia Type: Gen Natural Airway  Intra-op Monitoring Plan: Standard ASA Monitors  Post Op Pain Control Plan: multimodal analgesia  Induction:  IV  Informed Consent: Informed consent signed with the Patient and all parties understand the risks and agree with anesthesia plan.  All questions answered.   ASA Score: 3  Day of Surgery Review of History & Physical: H&P Update referred to the surgeon/provider.  Anesthesia Plan Notes: NPO confirmed.  BMI 36 Noted  No history of anesthesia problems.    Ready For Surgery From Anesthesia Perspective.     .

## 2023-11-29 NOTE — TRANSFER OF CARE
"Anesthesia Transfer of Care Note    Patient: Nick Mcdonough Sr.    Procedure(s) Performed: Procedure(s) (LRB):  COLONOSCOPY (N/A)  EGD (ESOPHAGOGASTRODUODENOSCOPY) (N/A)    Patient location: PACU    Anesthesia Type: general    Transport from OR: Transported from OR on 6-10 L/min O2 by face mask with adequate spontaneous ventilation    Post pain: adequate analgesia    Post assessment: no apparent anesthetic complications and tolerated procedure well    Post vital signs: stable    Level of consciousness: awake    Nausea/Vomiting: no nausea/vomiting    Complications: none    Transfer of care protocol was followed      Last vitals: Visit Vitals  BP (!) 141/83   Pulse 73   Temp 36.8 °C (98.2 °F) (Skin)   Resp 18   Ht 5' 9" (1.753 m)   Wt 111.1 kg (245 lb)   SpO2 99%   BMI 36.18 kg/m²     "

## 2023-11-29 NOTE — ANESTHESIA POSTPROCEDURE EVALUATION
Anesthesia Post Evaluation    Patient: Nick Mcdonough Sr.    Procedure(s) Performed: Procedure(s) (LRB):  COLONOSCOPY (N/A)  EGD (ESOPHAGOGASTRODUODENOSCOPY) (N/A)    Final Anesthesia Type: general      Patient location during evaluation: PACU  Patient participation: Yes- Able to Participate  Level of consciousness: awake and alert  Post-procedure vital signs: reviewed and stable  Pain management: adequate  Airway patency: patent    PONV status at discharge: No PONV  Anesthetic complications: yes  Perioperative Events: other periop events (see comments)      Leydi-operative Events Comments: Minor spontaneous nosebleed.  Cardiovascular status: blood pressure returned to baseline  Respiratory status: unassisted  Hydration status: euvolemic  Follow-up not needed.      Final Billing Type: Medically Directed        Vitals Value Taken Time   BP 98/59 11/29/23 0932   Temp 36.8 °C (98.2 °F) 11/29/23 0910   Pulse 61 11/29/23 0938   Resp 13 11/29/23 0938   SpO2 98 % 11/29/23 0938   Vitals shown include unvalidated device data.      No case tracking events are documented in the log.      Pain/Inocente Score: Inocente Score: 9 (11/29/2023  9:15 AM)

## 2023-11-29 NOTE — PROVATION PATIENT INSTRUCTIONS
Discharge Summary/Instructions after an Endoscopic Procedure  Patient Name: Nick Mcdonough  Patient MRN: [id number]  Patient YOB: 1965 Wednesday, November 29, 2023  Jace Prince MD  Dear patient,  As a result of recent federal legislation (The Federal Cures Act), you may   receive lab or pathology results from your procedure in your [facility]   account before your physician is able to contact you. Your physician or   their representative will relay the results to you with their   recommendations at their soonest availability.  Thank you,  RESTRICTIONS:  During your procedure today, you received medications for sedation.  These   medications may affect your judgment, balance and coordination.  Therefore,   for 24 hours, you have the following restrictions:   - DO NOT drive a car, operate machinery, make legal/financial decisions,   sign important papers or drink alcohol.    ACTIVITY:  Today: no heavy lifting, straining or running due to procedural   sedation/anesthesia.  The following day: return to full activity including work.  DIET:  Eat and drink normally unless instructed otherwise.     TREATMENT FOR COMMON SIDE EFFECTS:  - Mild abdominal pain, nausea, belching, bloating or excessive gas:  rest,   eat lightly and use a heating pad.  - Sore Throat: treat with throat lozenges and/or gargle with warm salt   water.  - Because air was used during the procedure, expelling large amounts of air   from your rectum or belching is normal.  - If a bowel prep was taken, you may not have a bowel movement for 1-3 days.    This is normal.  SYMPTOMS TO WATCH FOR AND REPORT TO YOUR PHYSICIAN:  1. Abdominal pain or bloating, other than gas cramps.  2. Chest pain.  3. Back pain.  4. Signs of infection such as: chills or fever occurring within 24 hours   after the procedure.  5. Rectal bleeding, which would show as bright red, maroon, or black stools.   (A tablespoon of blood from the rectum is not serious, especially  if   hemorrhoids are present.)  6. Vomiting.  7. Weakness or dizziness.  GO DIRECTLY TO THE NEAREST EMERGENCY ROOM IF YOU HAVE ANY OF THE FOLLOWING:      Difficulty breathing              Chills and/or fever over 101 F   Persistent vomiting and/or vomiting blood   Severe abdominal pain   Severe chest pain   Black, tarry stools   Bleeding- more than one tablespoon   Any other symptom or condition that you feel may need urgent attention  Your doctor recommends these additional instructions:  If any biopsies were taken, your doctors clinic will contact you in 1 to 2   weeks with any results.  - Patient has a contact number available for emergencies.  The signs and   symptoms of potential delayed complications were discussed with the   patient.  Return to normal activities tomorrow.  Written discharge   instructions were provided to the patient.   - Discharge patient to home (with escort).   - High fiber diet.   - Continue present medications.   - Await pathology results.   - Repeat colonoscopy in 3 years for surveillance.   - Return to referring physician as previously scheduled.   For questions, problems or results please call your physician - Jace Prince MD at Work:  [phone].  [facility] [place], EMERGENCY ROOM PHONE NUMBER: [phone]  IF A COMPLICATION OR EMERGENCY SITUATION ARISES AND YOU ARE UNABLE TO REACH   YOUR PHYSICIAN - GO DIRECTLY TO THE EMERGENCY ROOM.  Jace Prince MD  11/29/2023 9:08:39 AM  This report has been verified and signed electronically.  Dear patient,  As a result of recent federal legislation (The Federal Cures Act), you may   receive lab or pathology results from your procedure in your [facility]   account before your physician is able to contact you. Your physician or   their representative will relay the results to you with their   recommendations at their soonest availability.  Thank you,  PROVATION

## 2023-11-29 NOTE — PROVATION PATIENT INSTRUCTIONS
Discharge Summary/Instructions after an Endoscopic Procedure  Patient Name: Nick Mcdonough  Patient MRN: [id number]  Patient YOB: 1965 Wednesday, November 29, 2023  Jace Prince MD  Dear patient,  As a result of recent federal legislation (The Federal Cures Act), you may   receive lab or pathology results from your procedure in your [facility]   account before your physician is able to contact you. Your physician or   their representative will relay the results to you with their   recommendations at their soonest availability.  Thank you,  RESTRICTIONS:  During your procedure today, you received medications for sedation.  These   medications may affect your judgment, balance and coordination.  Therefore,   for 24 hours, you have the following restrictions:   - DO NOT drive a car, operate machinery, make legal/financial decisions,   sign important papers or drink alcohol.    ACTIVITY:  Today: no heavy lifting, straining or running due to procedural   sedation/anesthesia.  The following day: return to full activity including work.  DIET:  Eat and drink normally unless instructed otherwise.     TREATMENT FOR COMMON SIDE EFFECTS:  - Mild abdominal pain, nausea, belching, bloating or excessive gas:  rest,   eat lightly and use a heating pad.  - Sore Throat: treat with throat lozenges and/or gargle with warm salt   water.  - Because air was used during the procedure, expelling large amounts of air   from your rectum or belching is normal.  - If a bowel prep was taken, you may not have a bowel movement for 1-3 days.    This is normal.  SYMPTOMS TO WATCH FOR AND REPORT TO YOUR PHYSICIAN:  1. Abdominal pain or bloating, other than gas cramps.  2. Chest pain.  3. Back pain.  4. Signs of infection such as: chills or fever occurring within 24 hours   after the procedure.  5. Rectal bleeding, which would show as bright red, maroon, or black stools.   (A tablespoon of blood from the rectum is not serious, especially  if   hemorrhoids are present.)  6. Vomiting.  7. Weakness or dizziness.  GO DIRECTLY TO THE NEAREST EMERGENCY ROOM IF YOU HAVE ANY OF THE FOLLOWING:      Difficulty breathing              Chills and/or fever over 101 F   Persistent vomiting and/or vomiting blood   Severe abdominal pain   Severe chest pain   Black, tarry stools   Bleeding- more than one tablespoon   Any other symptom or condition that you feel may need urgent attention  Your doctor recommends these additional instructions:  If any biopsies were taken, your doctors clinic will contact you in 1 to 2   weeks with any results.  - Patient has a contact number available for emergencies.  The signs and   symptoms of potential delayed complications were discussed with the   patient.  Return to normal activities tomorrow.  Written discharge   instructions were provided to the patient.   - Discharge patient to home.   - Resume previous diet.   - Continue present medications.   - Await pathology results.  For questions, problems or results please call your physician - Jace Prince MD at Work:  [phone].  [facility] [place], EMERGENCY ROOM PHONE NUMBER: [phone]  IF A COMPLICATION OR EMERGENCY SITUATION ARISES AND YOU ARE UNABLE TO REACH   YOUR PHYSICIAN - GO DIRECTLY TO THE EMERGENCY ROOM.  Jace Prince MD  11/29/2023 8:32:53 AM  This report has been verified and signed electronically.  Dear patient,  As a result of recent federal legislation (The Federal Cures Act), you may   receive lab or pathology results from your procedure in your [facility]   account before your physician is able to contact you. Your physician or   their representative will relay the results to you with their   recommendations at their soonest availability.  Thank you,  PROVATION

## 2023-11-29 NOTE — H&P
Short Stay Endoscopy History and Physical    PCP - Giulia Solis NP    Procedure - EGD & Colonoscopy  ASA - per anesthesia  Mallampati - per anesthesia  History of Anesthesia problems - no  Family history Anesthesia problems -  no   Plan of anesthesia - General    HPI:  This is a 58 y.o. male here for evaluation of : Chronic GERD (no prior EGD) & hx of colon polyp.     Last colonoscopy in 2018 showed:                        - One 5 mm polyp in the ascending colon, removed                         with a hot snare. Resected and retrieved.                         - Diverticulosis in the ascending colon.                         - One 5 mm polyp in the descending colon, removed                         with a hot snare. Resected and retrieved.                         - Internal hemorrhoids.     ROS:  Constitutional: No fevers, chills  CV: No chest pain  Pulm: No shortness of breath  GI: see HPI  Derm: No rash    Medical History:  has a past medical history of Adult ADHD, Alcohol dependency (9/2015), Colon polyp (02/02/2018), GERD (gastroesophageal reflux disease), Hyperlipidemia, and Hypertension.    Surgical History:  has a past surgical history that includes Knee surgery (Right); Vasectomy; Hernia repair; Inguinal hernia repair (Left, 2012); and Colonoscopy (N/A, 2/2/2018).    Family History: family history includes Alcohol abuse in his brother and paternal grandfather; Arthritis in his mother; Cancer in his maternal grandmother; Depression in his mother; Drug abuse in his mother; Early death in his brother; Heart disease in his brother and paternal grandmother; Hyperlipidemia in his mother; Hypertension in his father and mother; Mental illness in his brother and mother; No Known Problems in his maternal grandfather.. Otherwise no colon cancer, inflammatory bowel disease, or GI malignancies.    Social History:  reports that he has been smoking cigarettes. He has a 35.0 pack-year smoking history. He has never  used smokeless tobacco. He reports current alcohol use of about 8.0 standard drinks of alcohol per week. He reports that he does not use drugs.    Review of patient's allergies indicates:   Allergen Reactions    Pcn [penicillins]        Medications:   Medications Prior to Admission   Medication Sig Dispense Refill Last Dose    ADDERALL XR 30 mg 24 hr capsule Take 2 capsules (60 mg total) by mouth every morning. BRAND ONLY 180 capsule 0     amLODIPine (NORVASC) 10 MG tablet Take 1 tablet (10 mg total) by mouth once daily. 90 tablet 1     atorvastatin (LIPITOR) 10 MG tablet Take 1 tablet (10 mg total) by mouth once daily. 90 tablet 0     doxazosin (CARDURA) 8 MG Tab Take 1 tablet (8 mg total) by mouth once daily. 90 tablet 1     fish oil-omega-3 fatty acids 300-1,000 mg capsule Take 1 g by mouth once daily.       lisinopriL (PRINIVIL,ZESTRIL) 40 MG tablet Take 1 tablet (40 mg total) by mouth once daily. 90 tablet 1     niacin 500 MG Tab Take 1 tablet (500 mg total) by mouth every evening. (Patient taking differently: Take 1,000 mg by mouth every evening.) 90 tablet 1     omeprazole (PRILOSEC) 40 MG capsule Take 1 capsule (40 mg total) by mouth once daily. 90 capsule 1     triamterene-hydrochlorothiazide 37.5-25 mg (MAXZIDE-25) 37.5-25 mg per tablet Take 1 tablet by mouth once daily. 90 tablet 3          Physical Exam:    Vital Signs: There were no vitals filed for this visit.    General Appearance: Well appearing in no acute distress  Eyes:    No scleral icterus  ENT: lips and tongue normal  Lungs: no use of accessory muscles  Heart:  normal rate, regular rhythm  Abdomen: Soft, non-tender, non distended   Extremities: no edema  Skin: No rash      Labs:  Lab Results   Component Value Date    WBC 8.15 09/22/2023    HGB 14.7 09/22/2023    HCT 43.4 09/22/2023     09/22/2023    CHOL 151 09/22/2023    TRIG 159 (H) 09/22/2023    HDL 26 (L) 09/22/2023     (H) 09/22/2023    AST 81 (H) 09/22/2023      09/22/2023    K 3.3 (L) 09/22/2023     09/22/2023    CREATININE 0.89 09/22/2023    BUN 18 09/22/2023    CO2 28 09/22/2023    TSH 3.060 09/22/2023    PSA 0.67 09/22/2023    HGBA1C 5.6 09/22/2023       I have explained the risks and benefits of endoscopy procedures to the patient including but not limited to bleeding, perforation, infection, and death.  The patient was asked if they understand and allowed to ask any further questions to their satisfaction.    Stephen Avalos MD

## 2023-12-04 LAB
FINAL PATHOLOGIC DIAGNOSIS: NORMAL
GROSS: NORMAL
Lab: NORMAL

## 2023-12-05 ENCOUNTER — TELEPHONE (OUTPATIENT)
Dept: GASTROENTEROLOGY | Facility: CLINIC | Age: 58
End: 2023-12-05

## 2023-12-05 NOTE — TELEPHONE ENCOUNTER
----- Message from Jace Prince MD sent at 12/4/2023  3:46 PM CST -----  Please call and notify patient, the colon polyps were benign.

## 2023-12-14 DIAGNOSIS — I10 ESSENTIAL HYPERTENSION: ICD-10-CM

## 2023-12-14 DIAGNOSIS — E78.2 MIXED HYPERLIPIDEMIA: ICD-10-CM

## 2023-12-14 DIAGNOSIS — E78.5 HYPERLIPIDEMIA, UNSPECIFIED HYPERLIPIDEMIA TYPE: ICD-10-CM

## 2023-12-14 DIAGNOSIS — K21.9 CHRONIC GERD: ICD-10-CM

## 2023-12-14 RX ORDER — ATORVASTATIN CALCIUM 10 MG/1
10 TABLET, FILM COATED ORAL DAILY
Qty: 90 TABLET | Refills: 3 | Status: SHIPPED | OUTPATIENT
Start: 2023-12-14 | End: 2024-12-13

## 2023-12-14 RX ORDER — GLUCOSAMINE/CHONDR SU A SOD 167-133 MG
500 CAPSULE ORAL NIGHTLY
Qty: 100 TABLET | Refills: 3 | Status: SHIPPED | OUTPATIENT
Start: 2023-12-14 | End: 2024-12-13

## 2023-12-14 RX ORDER — DOXAZOSIN 8 MG/1
8 TABLET ORAL DAILY
Qty: 90 TABLET | Refills: 3 | Status: SHIPPED | OUTPATIENT
Start: 2023-12-14

## 2023-12-14 RX ORDER — LISINOPRIL 40 MG/1
40 TABLET ORAL DAILY
Qty: 90 TABLET | Refills: 3 | Status: SHIPPED | OUTPATIENT
Start: 2023-12-14

## 2023-12-14 RX ORDER — AMLODIPINE BESYLATE 10 MG/1
10 TABLET ORAL DAILY
Qty: 90 TABLET | Refills: 3 | Status: SHIPPED | OUTPATIENT
Start: 2023-12-14

## 2023-12-14 RX ORDER — OMEPRAZOLE 40 MG/1
40 CAPSULE, DELAYED RELEASE ORAL DAILY
Qty: 90 CAPSULE | Refills: 3 | Status: SHIPPED | OUTPATIENT
Start: 2023-12-14 | End: 2024-12-13

## 2024-01-19 ENCOUNTER — OFFICE VISIT (OUTPATIENT)
Dept: FAMILY MEDICINE | Facility: CLINIC | Age: 59
End: 2024-01-19
Payer: COMMERCIAL

## 2024-01-19 VITALS
SYSTOLIC BLOOD PRESSURE: 126 MMHG | BODY MASS INDEX: 39.61 KG/M2 | OXYGEN SATURATION: 98 % | TEMPERATURE: 98 F | DIASTOLIC BLOOD PRESSURE: 82 MMHG | HEART RATE: 64 BPM | WEIGHT: 267.44 LBS | HEIGHT: 69 IN

## 2024-01-19 DIAGNOSIS — E66.01 CLASS 2 SEVERE OBESITY DUE TO EXCESS CALORIES WITH SERIOUS COMORBIDITY AND BODY MASS INDEX (BMI) OF 39.0 TO 39.9 IN ADULT: ICD-10-CM

## 2024-01-19 DIAGNOSIS — K21.9 GASTROESOPHAGEAL REFLUX DISEASE, UNSPECIFIED WHETHER ESOPHAGITIS PRESENT: ICD-10-CM

## 2024-01-19 DIAGNOSIS — Z86.010 HISTORY OF COLON POLYPS: ICD-10-CM

## 2024-01-19 DIAGNOSIS — F90.0 ADHD (ATTENTION DEFICIT HYPERACTIVITY DISORDER), INATTENTIVE TYPE: ICD-10-CM

## 2024-01-19 DIAGNOSIS — I10 PRIMARY HYPERTENSION: Primary | ICD-10-CM

## 2024-01-19 DIAGNOSIS — Z12.2 SCREENING FOR LUNG CANCER: ICD-10-CM

## 2024-01-19 DIAGNOSIS — R74.8 ELEVATED LIVER ENZYMES: ICD-10-CM

## 2024-01-19 DIAGNOSIS — E78.2 MIXED HYPERLIPIDEMIA: ICD-10-CM

## 2024-01-19 DIAGNOSIS — Z72.0 TOBACCO USER: ICD-10-CM

## 2024-01-19 PROBLEM — E66.812 CLASS 2 SEVERE OBESITY DUE TO EXCESS CALORIES WITH SERIOUS COMORBIDITY AND BODY MASS INDEX (BMI) OF 39.0 TO 39.9 IN ADULT: Status: ACTIVE | Noted: 2024-01-19

## 2024-01-19 PROBLEM — E66.813 CLASS 3 SEVERE OBESITY DUE TO EXCESS CALORIES WITH SERIOUS COMORBIDITY AND BODY MASS INDEX (BMI) OF 40.0 TO 44.9 IN ADULT: Status: RESOLVED | Noted: 2019-03-15 | Resolved: 2024-01-19

## 2024-01-19 PROCEDURE — 3074F SYST BP LT 130 MM HG: CPT | Mod: CPTII,S$GLB,, | Performed by: NURSE PRACTITIONER

## 2024-01-19 PROCEDURE — 3079F DIAST BP 80-89 MM HG: CPT | Mod: CPTII,S$GLB,, | Performed by: NURSE PRACTITIONER

## 2024-01-19 PROCEDURE — 99999 PR PBB SHADOW E&M-EST. PATIENT-LVL IV: CPT | Mod: PBBFAC,,, | Performed by: NURSE PRACTITIONER

## 2024-01-19 PROCEDURE — 99214 OFFICE O/P EST MOD 30 MIN: CPT | Mod: S$GLB,,, | Performed by: NURSE PRACTITIONER

## 2024-01-19 PROCEDURE — 3044F HG A1C LEVEL LT 7.0%: CPT | Mod: CPTII,S$GLB,, | Performed by: NURSE PRACTITIONER

## 2024-01-19 PROCEDURE — 1159F MED LIST DOCD IN RCRD: CPT | Mod: CPTII,S$GLB,, | Performed by: NURSE PRACTITIONER

## 2024-01-19 PROCEDURE — 1160F RVW MEDS BY RX/DR IN RCRD: CPT | Mod: CPTII,S$GLB,, | Performed by: NURSE PRACTITIONER

## 2024-01-19 PROCEDURE — 3008F BODY MASS INDEX DOCD: CPT | Mod: CPTII,S$GLB,, | Performed by: NURSE PRACTITIONER

## 2024-01-19 RX ORDER — DEXTROAMPHETAMINE SULFATE, DEXTROAMPHETAMINE SACCHARATE, AMPHETAMINE SULFATE AND AMPHETAMINE ASPARTATE 7.5; 7.5; 7.5; 7.5 MG/1; MG/1; MG/1; MG/1
60 CAPSULE, EXTENDED RELEASE ORAL EVERY MORNING
Qty: 180 CAPSULE | Refills: 0 | Status: SHIPPED | OUTPATIENT
Start: 2024-01-19 | End: 2024-04-22 | Stop reason: SDUPTHER

## 2024-01-19 NOTE — PROGRESS NOTES
"Subjective:       Patient ID: Nick Mcdonough Sr. is a 58 y.o. male.    Chief Complaint: Follow-up (3 months F/U)    Follow-up        Patient is a 58-year-old white male with Hypertension, Hyperlipidemia, chronic GERD, personal history of colon polyps, ADHD, tobacco user, fluid retention to lower extremities with venous stasis dermatitis, and obesity that is here today for medication refill. Last wellness exam in August 2022. here today for follow up with fasting lab results.     Hypertension  Currently taking Lisinopril 40 mg daily, Maxzide 25 mg daily, Doxazosin 8 mg daily and Amlodipine 10 mg daily.   Blood pressure is well controlled on these medications with no fluid retention.  /82 (BP Location: Left arm, Patient Position: Sitting, BP Method: Large (Manual))   Pulse 64   Temp 98.2 °F (36.8 °C) (Temporal)   Ht 5' 9" (1.753 m)   Wt 121.3 kg (267 lb 6.7 oz)   SpO2 98%   BMI 39.49 kg/m²              Hyperlipidemia  Intolerance to Rosuvastatin due to acute itching  On Pravastatin 2019 - June 2020 - triglycerides too high  Change to Atorvastatin June 2020 and levels in October 2020 much improved.  But in June 2021 - triglycerides again high and LDL running low 30s and so tried change to Zetia 10 mg daily  Initial recheck in August 2022 improved but now LDL increasing and liver enzymes high  CHANGE BACK TO ATORVASTATIN 10 mg daily in October 2023  LDL 54.4  Recheck in October 2024          Obese  Body mass index is 39.49 kg/m².  Working on lifestyle modifications  Recheck in October 2024         ADHD   stable on current regimen Adderall XR 30 mg 2 capsules daily.    Patient is able to focus and complete tasks effectively.  No side effects reported.   Patient has intolerance to GENERIC Adderall XR in the past due to ineffectiveness and crash effect but has been taking the BRAND Adderall XR 30 mg 2 capsule daily since prior to 2017 without issue.     Chronic GERD  Patient reports on PPI for multiple " years  States he never had upper EGD  Reports Nexium 40 mg no longer covered - has to change to Omeprazole  Taking Omeprazole 40 mg daily  saw GI for EGD 8/21/23  EGD 11/29/23:  Mild Erythematous Gastropathy  Continue PPI     Personal History of Colon Polyps  Last Colonoscopy 11/29/2023 - Dr. Alvarez  + polyps - repeat in 3 years.     Current smoker   for over 30 years - 1ppd smoker.    States he started smoking around age 21.    He declines the smoking cessation program.    CT lung screen up to date 2/17/2023 - will schedule for 2/2024      IFG   with HgbA1C 5.6% in September 2023  No history of elevations  FBG now 110 and A1C 5.6%  RESOLVED     Elevated liver enzymes  AST 81 and  in September 2023 = no history of elevations  No recent changes in medication  No recent illness reported  Levels improved but still elevated  Will get repeat hepatic function, hepatitis panel and liver ultrasound in 3 months and follow up      Lab Visit on 01/12/2024   Component Date Value Ref Range Status    Sodium 01/12/2024 146 (H)  136 - 145 mmol/L Final    Potassium 01/12/2024 4.0  3.5 - 5.1 mmol/L Final    Chloride 01/12/2024 107  95 - 110 mmol/L Final    CO2 01/12/2024 28  23 - 29 mmol/L Final    Glucose 01/12/2024 110  70 - 110 mg/dL Final    BUN 01/12/2024 22 (H)  2 - 20 mg/dL Final    Creatinine 01/12/2024 0.94  0.50 - 1.40 mg/dL Final    Calcium 01/12/2024 9.3  8.7 - 10.5 mg/dL Final    Total Protein 01/12/2024 7.2  6.0 - 8.4 g/dL Final    Albumin 01/12/2024 4.4  3.5 - 5.2 g/dL Final    Total Bilirubin 01/12/2024 0.7  0.1 - 1.0 mg/dL Final    Comment: For infants and newborns, interpretation of results should be based  on gestational age, weight and in agreement with clinical  observations.    Premature Infant recommended reference ranges:  Up to 24 hours.............<8.0 mg/dL  Up to 48 hours............<12.0 mg/dL  3-5 days..................<15.0 mg/dL  6-29 days.................<15.0 mg/dL      Alkaline  Phosphatase 01/12/2024 84  38 - 126 U/L Final    AST 01/12/2024 68 (H)  15 - 46 U/L Final    ALT 01/12/2024 94 (H)  10 - 44 U/L Final    Anion Gap 01/12/2024 11  8 - 16 mmol/L Final    eGFR 01/12/2024 >60.0  >60 mL/min/1.73 m^2 Final    Hemoglobin A1C 01/12/2024 5.6  4.0 - 5.6 % Final    Comment: ADA Screening Guidelines:  5.7-6.4%  Consistent with prediabetes  >or=6.5%  Consistent with diabetes    High levels of fetal hemoglobin interfere with the HbA1C  assay. Heterozygous hemoglobin variants (HbS, HgC, etc)do  not significantly interfere with this assay.   However, presence of multiple variants may affect accuracy.      Estimated Avg Glucose 01/12/2024 114  68 - 131 mg/dL Final    Cholesterol 01/12/2024 105 (L)  120 - 199 mg/dL Final    Comment: The National Cholesterol Education Program (NCEP) has set the  following guidelines (reference ranges) for Cholesterol:  Optimal.....................<200 mg/dL  Borderline High.............200-239 mg/dL  High........................> or = 240 mg/dL      Triglycerides 01/12/2024 68  30 - 150 mg/dL Final    Comment: The National Cholesterol Education Program (NCEP) has set the  following guidelines (reference values) for triglycerides:  Normal......................<150 mg/dL  Borderline High.............150-199 mg/dL  High........................200-499 mg/dL      HDL 01/12/2024 37 (L)  40 - 75 mg/dL Final    Comment: The National Cholesterol Education Program (NCEP) has set the  following guidelines (reference values) for HDL Cholesterol:  Low...............<40 mg/dL  Optimal...........>60 mg/dL      LDL Cholesterol 01/12/2024 54.4 (L)  63.0 - 159.0 mg/dL Final    Comment: The National Cholesterol Education Program (NCEP) has set the  following guidelines (reference values) for LDL Cholesterol:  Optimal.......................<130 mg/dL  Borderline High...............130-159 mg/dL  High..........................160-189 mg/dL  Very High.....................>190 mg/dL       "HDL/Cholesterol Ratio 01/12/2024 35.2  20.0 - 50.0 % Final    Total Cholesterol/HDL Ratio 01/12/2024 2.8  2.0 - 5.0 Final    Non-HDL Cholesterol 01/12/2024 68  mg/dL Final    Comment: Risk category and Non-HDL cholesterol goals:  Coronary heart disease (CHD)or equivalent (10-year risk of CHD >20%):  Non-HDL cholesterol goal     <130 mg/dL  Two or more CHD risk factors and 10-year risk of CHD <= 20%:  Non-HDL cholesterol goal     <160 mg/dL  0 to 1 CHD risk factor:  Non-HDL cholesterol goal     <190 mg/dL               Review of Systems   HENT: Negative.     Respiratory: Negative.     Cardiovascular: Negative.    Gastrointestinal: Negative.          Objective:     Vitals:    01/19/24 0703   BP: 126/82   BP Location: Left arm   Patient Position: Sitting   BP Method: Large (Manual)   Pulse: 64   Temp: 98.2 °F (36.8 °C)   TempSrc: Temporal   SpO2: 98%   Weight: 121.3 kg (267 lb 6.7 oz)   Height: 5' 9" (1.753 m)          Physical Exam  Constitutional:       General: He is not in acute distress.     Appearance: Normal appearance. He is obese. He is not toxic-appearing or diaphoretic.      Comments: Body mass index is 39.49 kg/m².       HENT:      Head: Normocephalic and atraumatic.   Eyes:      Extraocular Movements: Extraocular movements intact.      Conjunctiva/sclera: Conjunctivae normal.   Cardiovascular:      Rate and Rhythm: Normal rate and regular rhythm.      Heart sounds: Normal heart sounds.   Pulmonary:      Effort: Pulmonary effort is normal. No respiratory distress.      Breath sounds: Normal breath sounds.   Abdominal:      General: There is no distension.   Skin:     General: Skin is warm and dry.   Neurological:      Mental Status: He is alert and oriented to person, place, and time.   Psychiatric:         Mood and Affect: Mood normal.         Behavior: Behavior normal.         Thought Content: Thought content normal.           Assessment:         ICD-10-CM ICD-9-CM   1. Primary hypertension  I10 401.9 " "  2. Mixed hyperlipidemia  E78.2 272.2   3. Class 2 severe obesity due to excess calories with serious comorbidity and body mass index (BMI) of 39.0 to 39.9 in adult  E66.01 278.01    Z68.39 V85.39   4. ADHD (attention deficit hyperactivity disorder), inattentive type  F90.0 314.00   5. Gastroesophageal reflux disease, unspecified whether esophagitis present  K21.9 530.81   6. History of colon polyps  Z86.010 V12.72   7. Tobacco user  Z72.0 305.1   8. Elevated liver enzymes  R74.8 790.5       Plan:       1. Primary hypertension  Overview:  Currently taking Lisinopril 40 mg daily, Maxzide 25 mg daily, Doxazosin 8 mg daily and Amlodipine 10 mg daily.   Blood pressure is well controlled on these medications with no fluid retention.  /82 (BP Location: Left arm, Patient Position: Sitting, BP Method: Large (Manual))   Pulse 64   Temp 98.2 °F (36.8 °C) (Temporal)   Ht 5' 9" (1.753 m)   Wt 121.3 kg (267 lb 6.7 oz)   SpO2 98%   BMI 39.49 kg/m²       2. Mixed hyperlipidemia  Overview:  Intolerance to Rosuvastatin due to acute itching  On Pravastatin 2019 - June 2020 - triglycerides too high  Change to Atorvastatin June 2020 and levels in October 2020 much improved.  But in June 2021 - triglycerides again high and LDL running low 30s and so tried change to Zetia 10 mg daily  Initial recheck in August 2022 improved but now LDL increasing and liver enzymes high  CHANGE BACK TO ATORVASTATIN 10 mg daily in October 2023  LDL 54.4  Recheck in October 2024        3. Class 2 severe obesity due to excess calories with serious comorbidity and body mass index (BMI) of 39.0 to 39.9 in adult  Overview:  Body mass index is 39.49 kg/m².  Working on lifestyle modifications  Recheck in October 2024      4. ADHD (attention deficit hyperactivity disorder), inattentive type  Overview:  stable on current regimen Adderall XR 30 mg 2 capsules daily.    Patient is able to focus and complete tasks effectively.  No side effects reported.   " Patient has intolerance to GENERIC Adderall XR in the past due to ineffectiveness and crash effect but has been taking the BRAND Adderall XR 30 mg 2 capsule daily since prior to 2017 without issue.    Orders:  -     ADDERALL XR 30 mg 24 hr capsule; Take 2 capsules (60 mg total) by mouth every morning. BRAND ONLY  Dispense: 180 capsule; Refill: 0    5. Gastroesophageal reflux disease, unspecified whether esophagitis present  Overview:  Patient reports on PPI for multiple years  States he never had upper EGD  Reports Nexium 40 mg no longer covered - has to change to Omeprazole  Taking Omeprazole 40 mg daily  saw GI for EGD 8/21/23  EGD 11/29/23:  Mild Erythematous Gastropathy  Continue PPI      6. History of colon polyps  Overview:  Last Colonoscopy 11/29/2023 - Dr. Alvarez  + polyps - repeat in 3 years.      7. Tobacco user  Overview:  for over 30 years - 1ppd smoker.    States he started smoking around age 21.    He declines the smoking cessation program.    CT lung screen up to date 2/17/2023 - will schedule for 2/2024      8. Elevated liver enzymes  Overview:  AST 81 and  in September 2023 = no history of elevations  No recent changes in medication  No recent illness reported  Levels improved but still elevated  Will get repeat hepatic function, hepatitis panel and liver ultrasound in 3 months and follow up      Orders:  -     Hepatic Function Panel; Future; Expected date: 01/19/2024  -     HEPATITIS PANEL, ACUTE; Future; Expected date: 01/19/2024  -     US Abdomen Limited; Future; Expected date: 01/19/2024       Follow up in about 3 months (around 4/19/2024) for labs, ultrasound and follow up.     Patient's Medications   New Prescriptions    No medications on file   Previous Medications    AMLODIPINE (NORVASC) 10 MG TABLET    Take 1 tablet (10 mg total) by mouth once daily.    ATORVASTATIN (LIPITOR) 10 MG TABLET    Take 1 tablet (10 mg total) by mouth once daily.    DOXAZOSIN (CARDURA) 8 MG TAB    Take 1  tablet (8 mg total) by mouth once daily.    FISH OIL-OMEGA-3 FATTY ACIDS 300-1,000 MG CAPSULE    Take 1 g by mouth once daily.    LISINOPRIL (PRINIVIL,ZESTRIL) 40 MG TABLET    Take 1 tablet (40 mg total) by mouth once daily.    NIACIN 500 MG TAB    Take 1 tablet (500 mg total) by mouth every evening.    OMEPRAZOLE (PRILOSEC) 40 MG CAPSULE    Take 1 capsule (40 mg total) by mouth once daily.    TRIAMTERENE-HYDROCHLOROTHIAZIDE 37.5-25 MG (MAXZIDE-25) 37.5-25 MG PER TABLET    Take 1 tablet by mouth once daily.   Modified Medications    Modified Medication Previous Medication    ADDERALL XR 30 MG 24 HR CAPSULE ADDERALL XR 30 mg 24 hr capsule       Take 2 capsules (60 mg total) by mouth every morning. BRAND ONLY    Take 2 capsules (60 mg total) by mouth every morning. BRAND ONLY   Discontinued Medications    No medications on file       Past Medical History:   Diagnosis Date    Adult ADHD     Alcohol dependency 9/2015    last alcohol intake September 2015    Colon polyp 02/02/2018    2 polyps - tubular adenoma - repeat colonoscopy in 5 years    GERD (gastroesophageal reflux disease)     Hyperlipidemia     Hypertension        Past Surgical History:   Procedure Laterality Date    COLONOSCOPY N/A 2/2/2018    Procedure: COLONOSCOPY;  Surgeon: Eduard Medley MD;  Location: [facility];  Service: Endoscopy;  Laterality: N/A;    COLONOSCOPY N/A 11/29/2023    Procedure: COLONOSCOPY;  Surgeon: Jace Prince MD;  Location: [address]);  Service: Endoscopy;  Laterality: N/A;  Dr. Ren refer, pt requesting this date, ext/constipation prepMiralax instru via portal-  2nd flr-airway, >BMI possible sleep apnea per Dr. Ren  11/22-precall complete-MS    ESOPHAGOGASTRODUODENOSCOPY N/A 11/29/2023    Procedure: EGD (ESOPHAGOGASTRODUODENOSCOPY);  Surgeon: Jace Prince MD;  Location: [address]);  Service: Endoscopy;  Laterality: N/A;    HERNIA REPAIR      INGUINAL HERNIA REPAIR Left 2012    done at [facility]     KNEE SURGERY Right     VASECTOMY         Family History   Problem Relation Age of Onset    Hypertension Father     Arthritis Mother     Depression Mother     Drug abuse Mother     Hyperlipidemia Mother     Hypertension Mother     Mental illness Mother     Heart disease Brother         acute MI -  at at 46    Alcohol abuse Brother     Early death Brother     Mental illness Brother     Cancer Maternal Grandmother     No Known Problems Maternal Grandfather     Heart disease Paternal Grandmother     Alcohol abuse Paternal Grandfather        Social History     Socioeconomic History    Marital status:    Occupational History    Occupation:    Tobacco Use    Smoking status: Every Day     Current packs/day: 1.00     Average packs/day: 1 pack/day for 35.0 years (35.0 ttl pk-yrs)     Types: Cigarettes    Smokeless tobacco: Never   Substance and Sexual Activity    Alcohol use: Yes     Alcohol/week: 8.0 standard drinks of alcohol     Types: 8 Cans of beer per week     Comment: every weekend - a couple beers per weekend night    Drug use: No    Sexual activity: Yes     Partners: Female     Social Determinants of Health     Financial Resource Strain: Low Risk  (2023)    Overall Financial Resource Strain (CARDIA)     Difficulty of Paying Living Expenses: Not hard at all   Food Insecurity: No Food Insecurity (2023)    Hunger Vital Sign     Worried About Running Out of Food in the Last Year: Never true     Ran Out of Food in the Last Year: Never true   Transportation Needs: No Transportation Needs (2023)    PRAPARE - Transportation     Lack of Transportation (Medical): No     Lack of Transportation (Non-Medical): No   Physical Activity: Insufficiently Active (2023)    Exercise Vital Sign     Days of Exercise per Week: 2 days     Minutes of Exercise per Session: 10 min   Stress: No Stress Concern Present (2023)    [place] [employer] - Occupational Stress  Questionnaire     Feeling of Stress : Only a little   Social Connections: Unknown (8/21/2023)    Social Connection and Isolation Panel [NHANES]     Frequency of Communication with Friends and Family: Three times a week     Frequency of Social Gatherings with Friends and Family: Once a week     Active Member of Clubs or Organizations: No     Attends Club or Organization Meetings: Never     Marital Status:    Housing Stability: Low Risk  (8/21/2023)    Housing Stability Vital Sign     Unable to Pay for Housing in the Last Year: No     Number of Places Lived in the Last Year: 1     Unstable Housing in the Last Year: No

## 2024-03-18 ENCOUNTER — PATIENT MESSAGE (OUTPATIENT)
Dept: FAMILY MEDICINE | Facility: CLINIC | Age: 59
End: 2024-03-18
Payer: COMMERCIAL

## 2024-04-17 ENCOUNTER — TELEPHONE (OUTPATIENT)
Dept: FAMILY MEDICINE | Facility: CLINIC | Age: 59
End: 2024-04-17
Payer: COMMERCIAL

## 2024-04-17 NOTE — TELEPHONE ENCOUNTER
----- Message from Rica Butterfield sent at 4/17/2024 10:07 AM CDT -----  Type: Appointment Request    When is the first available appointment?n/a  Would the patient rather a call back or a response via [facility]? ANDREW  Best Call Back Number:[phone]  Additional Information:   Pt received a missed call, I see there is a note to reschedule his appointment.  Friday afternoon or Thursday afternoon if Friday is not available works best

## 2024-04-17 NOTE — TELEPHONE ENCOUNTER
Left message on patient voicemail that his labs appt appt was reschedule from 04/26 until end of May.

## 2024-04-22 ENCOUNTER — OFFICE VISIT (OUTPATIENT)
Dept: FAMILY MEDICINE | Facility: CLINIC | Age: 59
End: 2024-04-22
Payer: COMMERCIAL

## 2024-04-22 VITALS
HEART RATE: 103 BPM | DIASTOLIC BLOOD PRESSURE: 70 MMHG | OXYGEN SATURATION: 97 % | WEIGHT: 256.06 LBS | TEMPERATURE: 98 F | SYSTOLIC BLOOD PRESSURE: 110 MMHG | HEIGHT: 69 IN | BODY MASS INDEX: 37.93 KG/M2

## 2024-04-22 DIAGNOSIS — Z72.0 TOBACCO USER: ICD-10-CM

## 2024-04-22 DIAGNOSIS — F90.0 ADHD (ATTENTION DEFICIT HYPERACTIVITY DISORDER), INATTENTIVE TYPE: ICD-10-CM

## 2024-04-22 DIAGNOSIS — I10 PRIMARY HYPERTENSION: Primary | ICD-10-CM

## 2024-04-22 DIAGNOSIS — E78.2 MIXED HYPERLIPIDEMIA: ICD-10-CM

## 2024-04-22 DIAGNOSIS — E66.01 CLASS 2 SEVERE OBESITY DUE TO EXCESS CALORIES WITH SERIOUS COMORBIDITY AND BODY MASS INDEX (BMI) OF 37.0 TO 37.9 IN ADULT: ICD-10-CM

## 2024-04-22 DIAGNOSIS — Z86.010 HISTORY OF COLON POLYPS: ICD-10-CM

## 2024-04-22 DIAGNOSIS — K21.9 GASTROESOPHAGEAL REFLUX DISEASE, UNSPECIFIED WHETHER ESOPHAGITIS PRESENT: ICD-10-CM

## 2024-04-22 DIAGNOSIS — R74.8 ELEVATED LIVER ENZYMES: ICD-10-CM

## 2024-04-22 PROBLEM — Z77.098 CHLORINE GAS EXPOSURE: Status: RESOLVED | Noted: 2017-07-30 | Resolved: 2024-04-22

## 2024-04-22 PROCEDURE — 1160F RVW MEDS BY RX/DR IN RCRD: CPT | Mod: CPTII,S$GLB,, | Performed by: NURSE PRACTITIONER

## 2024-04-22 PROCEDURE — 1159F MED LIST DOCD IN RCRD: CPT | Mod: CPTII,S$GLB,, | Performed by: NURSE PRACTITIONER

## 2024-04-22 PROCEDURE — 3074F SYST BP LT 130 MM HG: CPT | Mod: CPTII,S$GLB,, | Performed by: NURSE PRACTITIONER

## 2024-04-22 PROCEDURE — 4010F ACE/ARB THERAPY RXD/TAKEN: CPT | Mod: CPTII,S$GLB,, | Performed by: NURSE PRACTITIONER

## 2024-04-22 PROCEDURE — 3044F HG A1C LEVEL LT 7.0%: CPT | Mod: CPTII,S$GLB,, | Performed by: NURSE PRACTITIONER

## 2024-04-22 PROCEDURE — 3078F DIAST BP <80 MM HG: CPT | Mod: CPTII,S$GLB,, | Performed by: NURSE PRACTITIONER

## 2024-04-22 PROCEDURE — 99214 OFFICE O/P EST MOD 30 MIN: CPT | Mod: S$GLB,,, | Performed by: NURSE PRACTITIONER

## 2024-04-22 PROCEDURE — 3008F BODY MASS INDEX DOCD: CPT | Mod: CPTII,S$GLB,, | Performed by: NURSE PRACTITIONER

## 2024-04-22 PROCEDURE — 99999 PR PBB SHADOW E&M-EST. PATIENT-LVL IV: CPT | Mod: PBBFAC,,, | Performed by: NURSE PRACTITIONER

## 2024-04-22 RX ORDER — DEXTROAMPHETAMINE SULFATE, DEXTROAMPHETAMINE SACCHARATE, AMPHETAMINE SULFATE AND AMPHETAMINE ASPARTATE 7.5; 7.5; 7.5; 7.5 MG/1; MG/1; MG/1; MG/1
60 CAPSULE, EXTENDED RELEASE ORAL EVERY MORNING
Qty: 180 CAPSULE | Refills: 0 | Status: SHIPPED | OUTPATIENT
Start: 2024-04-22

## 2024-04-22 NOTE — PROGRESS NOTES
"Subjective:       Patient ID: Nick Mcdonough Sr. is a 58 y.o. male.    Chief Complaint: Follow-up (3 months F/U)    HPI    Patient is a 58-year-old white male with Hypertension, Hyperlipidemia, chronic GERD, personal history of colon polyps, ADHD, tobacco user, fluid retention to lower extremities with venous stasis dermatitis, and obesity that is here today for medication refill and lab results.     Hypertension  Currently taking Lisinopril 40 mg daily, Maxzide 25 mg daily, Doxazosin 8 mg daily and Amlodipine 10 mg daily.   Blood pressure is well controlled on these medications with no fluid retention.  /70 (BP Location: Left arm, Patient Position: Sitting, BP Method: Large (Manual))   Pulse 103   Temp 98.1 °F (36.7 °C) (Temporal)   Ht 5' 9" (1.753 m)   Wt 116.2 kg (256 lb 1 oz)   SpO2 97%   BMI 37.81 kg/m²   Stable.           Hyperlipidemia  Intolerance to Rosuvastatin due to acute itching  On Pravastatin 2019 - June 2020 - triglycerides too high  Change to Atorvastatin June 2020 and levels in October 2020 much improved.  But in June 2021 - triglycerides again high and LDL running low 30s and so tried change to Zetia 10 mg daily  Initial recheck in August 2022 improved but LDL increasing and liver enzymes high in September 2023  CHANGE BACK TO ATORVASTATIN 10 mg daily in October 2023  LDL 54.4 - levels much better.  Recheck in October 2024          Obese  Body mass index is 37.81 kg/m².  Working on lifestyle modifications  Recheck in October 2024         ADHD   stable on current regimen Adderall XR 30 mg 2 capsules daily.    Patient is able to focus and complete tasks effectively.  No side effects reported.   Patient has intolerance to GENERIC Adderall XR in the past due to ineffectiveness and crash effect but has been taking the BRAND Adderall XR 30 mg 2 capsule daily since prior to 2017 without issue.  Stable.     Chronic GERD  Patient reports on PPI for multiple years  States he never had upper " "EGD  Reports Nexium 40 mg no longer covered - has to change to Omeprazole  Taking Omeprazole 40 mg daily  saw GI for EGD 8/21/23  EGD 11/29/23:  Mild Erythematous Gastropathy  Continue PPI  Stable.     Personal History of Colon Polyps  Last Colonoscopy 11/29/2023 - Dr. Alvarez  + polyps - repeat in 3 years - November 2026     Current smoker   for over 30 years - 1ppd smoker.    States he started smoking around age 23.    He declines the smoking cessation program.    CT lung screen due - will schedule now.     IFG   with HgbA1C 5.6% in September 2023  No history of elevations   and A1C 5.6% latest labs  RESOLVED           Elevated liver enzymes  AST 81 and  in September 2023 = no history of elevations since 2018 and before.  No recent changes in medication  No recent illness reported  Levels improved but still elevated in January 2024  Plan to repeat hepatic function, hepatitis panel and liver ultrasound in 3 months   LIVER ENZYMES are now NORMAL  Hepatitis panel is negative  Liver ultrasound was NOT done but since labs are okay - we can hold off on imaging.      Component      Latest Ref Rng 4/19/2024   Hepatitis B Surface Ag      Non-reactive  Non-reactive    Hep B C IgM      Non-reactive  Non-reactive    Hep A IgM      Non-reactive  Non-reactive    Hepatitis C Ab      Non-reactive  Non-reactive                Vitals:    04/22/24 1337   BP: 110/70   BP Location: Left arm   Patient Position: Sitting   BP Method: Large (Manual)   Pulse: 103   Temp: 98.1 °F (36.7 °C)   TempSrc: Temporal   SpO2: 97%   Weight: 116.2 kg (256 lb 1 oz)   Height: 5' 9" (1.753 m)       Assessment:         ICD-10-CM ICD-9-CM   1. Primary hypertension  I10 401.9   2. Mixed hyperlipidemia  E78.2 272.2   3. Class 2 severe obesity due to excess calories with serious comorbidity and body mass index (BMI) of 37.0 to 37.9 in adult  E66.01 278.01    Z68.37 V85.37   4. Elevated liver enzymes  R74.8 790.5   5. ADHD (attention " "deficit hyperactivity disorder), inattentive type  F90.0 314.00   6. Gastroesophageal reflux disease, unspecified whether esophagitis present  K21.9 530.81   7. History of colon polyps  Z86.010 V12.72   8. Tobacco user  Z72.0 305.1       Plan:       1. Primary hypertension  Overview:  Currently taking Lisinopril 40 mg daily, Maxzide 25 mg daily, Doxazosin 8 mg daily and Amlodipine 10 mg daily.   Blood pressure is well controlled on these medications with no fluid retention.  /70 (BP Location: Left arm, Patient Position: Sitting, BP Method: Large (Manual))   Pulse 103   Temp 98.1 °F (36.7 °C) (Temporal)   Ht 5' 9" (1.753 m)   Wt 116.2 kg (256 lb 1 oz)   SpO2 97%   BMI 37.81 kg/m²   Stable.      2. Mixed hyperlipidemia  Overview:  Intolerance to Rosuvastatin due to acute itching  On Pravastatin 2019 - June 2020 - triglycerides too high  Change to Atorvastatin June 2020 and levels in October 2020 much improved.  But in June 2021 - triglycerides again high and LDL running low 30s and so tried change to Zetia 10 mg daily  Initial recheck in August 2022 improved but LDL increasing and liver enzymes high in September 2023  CHANGE BACK TO ATORVASTATIN 10 mg daily in October 2023  LDL 54.4 - levels much better.  Recheck in October 2024        3. Class 2 severe obesity due to excess calories with serious comorbidity and body mass index (BMI) of 37.0 to 37.9 in adult  Overview:  Body mass index is 37.81 kg/m².  Working on lifestyle modifications  Recheck in October 2024             4. Elevated liver enzymes  Overview:  AST 81 and  in September 2023 = no history of elevations since 2018 and before.  No recent changes in medication  No recent illness reported  Levels improved but still elevated in January 2024  Plan to repeat hepatic function, hepatitis panel and liver ultrasound in 3 months   LIVER ENZYMES are now NORMAL  Hepatitis panel is negative  Liver ultrasound was NOT done but since labs are okay - we " can hold off on imaging.    Component      Latest Ref Rng 4/19/2024   Hepatitis B Surface Ag      Non-reactive  Non-reactive    Hep B C IgM      Non-reactive  Non-reactive    Hep A IgM      Non-reactive  Non-reactive    Hepatitis C Ab      Non-reactive  Non-reactive            5. ADHD (attention deficit hyperactivity disorder), inattentive type  Overview:  stable on current regimen Adderall XR 30 mg 2 capsules daily.    Patient is able to focus and complete tasks effectively.  No side effects reported.   Patient has intolerance to GENERIC Adderall XR in the past due to ineffectiveness and crash effect but has been taking the BRAND Adderall XR 30 mg 2 capsule daily since prior to 2017 without issue.  Stable.    Orders:  -     ADDERALL XR 30 mg 24 hr capsule; Take 2 capsules (60 mg total) by mouth every morning. BRAND ONLY  Dispense: 180 capsule; Refill: 0    6. Gastroesophageal reflux disease, unspecified whether esophagitis present  Overview:  Patient reports on PPI for multiple years  States he never had upper EGD  Reports Nexium 40 mg no longer covered - has to change to Omeprazole  Taking Omeprazole 40 mg daily  saw GI for EGD 8/21/23  EGD 11/29/23:  Mild Erythematous Gastropathy  Continue PPI  Stable.      7. History of colon polyps  Overview:  Last Colonoscopy 11/29/2023 - Dr. Alvarez  + polyps - repeat in 3 years - November 2026      8. Tobacco user  Overview:  for over 30 years - 1ppd smoker.    States he started smoking around age 23.    He declines the smoking cessation program.    CT lung screen due - will schedule now.         Follow up in about 3 months (around 7/22/2024) for med check only; labs and wellness exam due in October..     Patient's Medications   New Prescriptions    No medications on file   Previous Medications    AMLODIPINE (NORVASC) 10 MG TABLET    Take 1 tablet (10 mg total) by mouth once daily.    ATORVASTATIN (LIPITOR) 10 MG TABLET    Take 1 tablet (10 mg total) by mouth once daily.     DOXAZOSIN (CARDURA) 8 MG TAB    Take 1 tablet (8 mg total) by mouth once daily.    FISH OIL-OMEGA-3 FATTY ACIDS 300-1,000 MG CAPSULE    Take 1 g by mouth once daily.    LISINOPRIL (PRINIVIL,ZESTRIL) 40 MG TABLET    Take 1 tablet (40 mg total) by mouth once daily.    NIACIN 500 MG TAB    Take 1 tablet (500 mg total) by mouth every evening.    OMEPRAZOLE (PRILOSEC) 40 MG CAPSULE    Take 1 capsule (40 mg total) by mouth once daily.    TRIAMTERENE-HYDROCHLOROTHIAZIDE 37.5-25 MG (MAXZIDE-25) 37.5-25 MG PER TABLET    Take 1 tablet by mouth once daily.   Modified Medications    Modified Medication Previous Medication    ADDERALL XR 30 MG 24 HR CAPSULE ADDERALL XR 30 mg 24 hr capsule       Take 2 capsules (60 mg total) by mouth every morning. BRAND ONLY    Take 2 capsules (60 mg total) by mouth every morning. BRAND ONLY   Discontinued Medications    No medications on file         Review of Systems   HENT: Negative.     Respiratory: Negative.     Cardiovascular: Negative.    Gastrointestinal: Negative.          Objective:        Physical Exam  Constitutional:       General: He is not in acute distress.     Appearance: Normal appearance. He is obese. He is not toxic-appearing or diaphoretic.      Comments: Body mass index is 37.81 kg/m².         HENT:      Head: Normocephalic and atraumatic.   Eyes:      Extraocular Movements: Extraocular movements intact.      Conjunctiva/sclera: Conjunctivae normal.   Cardiovascular:      Rate and Rhythm: Normal rate and regular rhythm.      Heart sounds: Normal heart sounds.   Pulmonary:      Effort: Pulmonary effort is normal. No respiratory distress.      Breath sounds: Normal breath sounds.   Abdominal:      General: There is no distension.   Skin:     General: Skin is warm and dry.   Neurological:      Mental Status: He is alert and oriented to person, place, and time.   Psychiatric:         Mood and Affect: Mood normal.         Behavior: Behavior normal.         Thought Content:  Thought content normal.             Past Medical History:   Diagnosis Date    Adult ADHD     Alcohol dependency 2015    last alcohol intake 2015    Colon polyp 2018    2 polyps - tubular adenoma - repeat colonoscopy in 5 years    GERD (gastroesophageal reflux disease)     Hyperlipidemia     Hypertension        Past Surgical History:   Procedure Laterality Date    COLONOSCOPY N/A 2018    Procedure: COLONOSCOPY;  Surgeon: dEuard Medley MD;  Location: [facility] ENDO;  Service: Endoscopy;  Laterality: N/A;    COLONOSCOPY N/A 2023    Procedure: COLONOSCOPY;  Surgeon: Jace Prince MD;  Location: [facility] (2ND FLR);  Service: Endoscopy;  Laterality: N/A;  Dr. Ren refer, pt requesting this date, ext/constipation prepMiralax instru via portal-  2nd flr-airway, >BMI possible sleep apnea per Dr. Ren  -precall complete-MS    ESOPHAGOGASTRODUODENOSCOPY N/A 2023    Procedure: EGD (ESOPHAGOGASTRODUODENOSCOPY);  Surgeon: Jace Prince MD;  Location: [facility] ([address] [place] [address]);  Service: Endoscopy;  Laterality: N/A;    HERNIA REPAIR      INGUINAL HERNIA REPAIR Left     done at [facility]    KNEE SURGERY Right     VASECTOMY         Family History   Problem Relation Name Age of Onset    Hypertension Father Jose     Arthritis Mother Gwendolyn     Depression Mother Gwendolyn     Drug abuse Mother Gwendolyn     Hyperlipidemia Mother Gwendolyn     Hypertension Mother Gwendolyn     Mental illness Mother Gwendolyn     Heart disease Brother Luiz         acute MI -  at at 46    Alcohol abuse Brother Luiz     Early death Brother Luiz     Mental illness Brother Luiz     Cancer Maternal Grandmother Danielle     No Known Problems Maternal Grandfather      Heart disease Paternal Grandmother Danielle     Alcohol abuse Paternal Grandfather Jose        Social History     Socioeconomic History    Marital status:    Occupational History    Occupation:    Tobacco Use    Smoking  status: Every Day     Current packs/day: 0.50     Average packs/day: 1 pack/day for 35.5 years (35.5 ttl pk-yrs)     Types: Cigarettes     Start date: 10/12/1988    Smokeless tobacco: Never   Substance and Sexual Activity    Alcohol use: Yes     Alcohol/week: 8.0 standard drinks of alcohol     Types: 8 Cans of beer per week     Comment: every weekend - a couple beers per weekend night    Drug use: No    Sexual activity: Yes     Partners: Female     Social Determinants of Health     Financial Resource Strain: Low Risk  (8/21/2023)    Overall Financial Resource Strain (CARDIA)     Difficulty of Paying Living Expenses: Not hard at all   Food Insecurity: No Food Insecurity (8/21/2023)    Hunger Vital Sign     Worried About Running Out of Food in the Last Year: Never true     Ran Out of Food in the Last Year: Never true   Transportation Needs: No Transportation Needs (8/21/2023)    PRAPARE - Transportation     Lack of Transportation (Medical): No     Lack of Transportation (Non-Medical): No   Physical Activity: Insufficiently Active (8/21/2023)    Exercise Vital Sign     Days of Exercise per Week: 2 days     Minutes of Exercise per Session: 10 min   Stress: No Stress Concern Present (8/21/2023)    [employer] [place] [employer] - Occupational Stress Questionnaire     Feeling of Stress : Only a little   Social Connections: Unknown (8/21/2023)    Social Connection and Isolation Panel [NHANES]     Frequency of Communication with Friends and Family: Three times a week     Frequency of Social Gatherings with Friends and Family: Once a week     Active Member of Clubs or Organizations: No     Attends Club or Organization Meetings: Never     Marital Status:    Housing Stability: Low Risk  (8/21/2023)    Housing Stability Vital Sign     Unable to Pay for Housing in the Last Year: No     Number of Places Lived in the Last Year: 1     Unstable Housing in the Last Year: No

## 2024-05-02 ENCOUNTER — TELEPHONE (OUTPATIENT)
Dept: FAMILY MEDICINE | Facility: CLINIC | Age: 59
End: 2024-05-02
Payer: COMMERCIAL

## 2024-05-02 DIAGNOSIS — I70.0 AORTIC ATHEROSCLEROSIS: Primary | ICD-10-CM

## 2024-05-02 NOTE — TELEPHONE ENCOUNTER
----- Message from Arnoldo Rhodes Jr., MD sent at 11/14/2019  8:58 AM CST -----  I am pleased to notify you of your normal laboratory results. Normal CBC, urinalysis, chem profile with normal liver and kidney function    E-mailed result note.      Please advise patient that lung screening is okay - no suspicious lung nodules - repeat in 1 year.    Advise patient that it did show aortic atherosclerosis which is mild plaque buildup on  the aorta.  Start ASA 81 mg daily and stay on Atorvastatin cholesterol medication.

## 2024-07-02 ENCOUNTER — TELEPHONE (OUTPATIENT)
Dept: FAMILY MEDICINE | Facility: CLINIC | Age: 59
End: 2024-07-02
Payer: COMMERCIAL

## 2024-07-02 NOTE — TELEPHONE ENCOUNTER
----- Message from Irina Cortez sent at 7/2/2024 12:45 PM CDT -----  Type:  Patient Returning Call    Who Called: Pt   Who Left Message for Patient: Jordana   Does the patient know what this is regarding?: Returning a missed call   Would the patient rather a call back or a response via tapvivaner? Call back   Best Call Back Number: [phone]

## 2024-07-03 ENCOUNTER — OFFICE VISIT (OUTPATIENT)
Dept: FAMILY MEDICINE | Facility: CLINIC | Age: 59
End: 2024-07-03
Payer: COMMERCIAL

## 2024-07-03 VITALS
WEIGHT: 263.25 LBS | TEMPERATURE: 98 F | OXYGEN SATURATION: 98 % | SYSTOLIC BLOOD PRESSURE: 124 MMHG | HEIGHT: 69 IN | HEART RATE: 75 BPM | BODY MASS INDEX: 38.99 KG/M2 | DIASTOLIC BLOOD PRESSURE: 82 MMHG

## 2024-07-03 DIAGNOSIS — Z13.29 THYROID DISORDER SCREEN: ICD-10-CM

## 2024-07-03 DIAGNOSIS — E66.01 CLASS 2 SEVERE OBESITY DUE TO EXCESS CALORIES WITH SERIOUS COMORBIDITY AND BODY MASS INDEX (BMI) OF 38.0 TO 38.9 IN ADULT: ICD-10-CM

## 2024-07-03 DIAGNOSIS — Z00.00 ENCOUNTER FOR BLOOD TEST FOR ROUTINE GENERAL PHYSICAL EXAMINATION: ICD-10-CM

## 2024-07-03 DIAGNOSIS — Z13.1 DIABETES MELLITUS SCREENING: ICD-10-CM

## 2024-07-03 DIAGNOSIS — Z12.5 SCREENING PSA (PROSTATE SPECIFIC ANTIGEN): ICD-10-CM

## 2024-07-03 DIAGNOSIS — Z13.0 SCREENING FOR DEFICIENCY ANEMIA: ICD-10-CM

## 2024-07-03 DIAGNOSIS — I70.0 AORTIC ATHEROSCLEROSIS: ICD-10-CM

## 2024-07-03 DIAGNOSIS — F90.0 ADHD (ATTENTION DEFICIT HYPERACTIVITY DISORDER), INATTENTIVE TYPE: Primary | ICD-10-CM

## 2024-07-03 DIAGNOSIS — I10 PRIMARY HYPERTENSION: ICD-10-CM

## 2024-07-03 DIAGNOSIS — Z72.0 TOBACCO USER: ICD-10-CM

## 2024-07-03 DIAGNOSIS — Z86.010 HISTORY OF COLON POLYPS: ICD-10-CM

## 2024-07-03 DIAGNOSIS — K21.9 GASTROESOPHAGEAL REFLUX DISEASE, UNSPECIFIED WHETHER ESOPHAGITIS PRESENT: ICD-10-CM

## 2024-07-03 DIAGNOSIS — E78.2 MIXED HYPERLIPIDEMIA: ICD-10-CM

## 2024-07-03 DIAGNOSIS — R74.8 ELEVATED LIVER ENZYMES: ICD-10-CM

## 2024-07-03 PROCEDURE — 1160F RVW MEDS BY RX/DR IN RCRD: CPT | Mod: CPTII,S$GLB,, | Performed by: NURSE PRACTITIONER

## 2024-07-03 PROCEDURE — 99999 PR PBB SHADOW E&M-EST. PATIENT-LVL IV: CPT | Mod: PBBFAC,,, | Performed by: NURSE PRACTITIONER

## 2024-07-03 PROCEDURE — 3074F SYST BP LT 130 MM HG: CPT | Mod: CPTII,S$GLB,, | Performed by: NURSE PRACTITIONER

## 2024-07-03 PROCEDURE — 99214 OFFICE O/P EST MOD 30 MIN: CPT | Mod: S$GLB,,, | Performed by: NURSE PRACTITIONER

## 2024-07-03 PROCEDURE — 3044F HG A1C LEVEL LT 7.0%: CPT | Mod: CPTII,S$GLB,, | Performed by: NURSE PRACTITIONER

## 2024-07-03 PROCEDURE — 3008F BODY MASS INDEX DOCD: CPT | Mod: CPTII,S$GLB,, | Performed by: NURSE PRACTITIONER

## 2024-07-03 PROCEDURE — 3079F DIAST BP 80-89 MM HG: CPT | Mod: CPTII,S$GLB,, | Performed by: NURSE PRACTITIONER

## 2024-07-03 PROCEDURE — 1159F MED LIST DOCD IN RCRD: CPT | Mod: CPTII,S$GLB,, | Performed by: NURSE PRACTITIONER

## 2024-07-03 PROCEDURE — 4010F ACE/ARB THERAPY RXD/TAKEN: CPT | Mod: CPTII,S$GLB,, | Performed by: NURSE PRACTITIONER

## 2024-07-03 RX ORDER — DEXTROAMPHETAMINE SULFATE, DEXTROAMPHETAMINE SACCHARATE, AMPHETAMINE SULFATE AND AMPHETAMINE ASPARTATE 7.5; 7.5; 7.5; 7.5 MG/1; MG/1; MG/1; MG/1
60 CAPSULE, EXTENDED RELEASE ORAL EVERY MORNING
Qty: 180 CAPSULE | Refills: 0 | Status: SHIPPED | OUTPATIENT
Start: 2024-07-03

## 2024-07-03 NOTE — PROGRESS NOTES
"Subjective:       Patient ID: Nick Mcdonough Sr. is a 58 y.o. male.    Chief Complaint: ADHD (3 months Med Check)    HPI    Patient is a 58-year-old white male with Aortic Atherosclerosis, Hypertension, Hyperlipidemia, chronic GERD, personal history of colon polyps, ADHD, tobacco user, fluid retention to lower extremities with venous stasis dermatitis, and obesity that is here today for medication refill and lab results.       Aortic Atherosclerosis  Seen on CT lung screening 4/2024  Taking ASA 81 mg daily  Already on Statin therapy  with LDL 54.4    Hypertension  Currently taking Lisinopril 40 mg daily, Maxzide 25 mg daily, Doxazosin 8 mg daily and Amlodipine 10 mg daily.   Blood pressure is well controlled on these medications with no fluid retention.  /82 (BP Location: Left arm, Patient Position: Sitting, BP Method: Large (Manual))   Pulse 75   Temp 98.4 °F (36.9 °C) (Temporal)   Ht 5' 9" (1.753 m)   Wt 119.4 kg (263 lb 3.7 oz)   SpO2 98%   BMI 38.87 kg/m²   Stable.           Hyperlipidemia  Intolerance to Rosuvastatin due to acute itching  On Pravastatin 2019 - June 2020 - triglycerides too high  Change to Atorvastatin June 2020 and levels in October 2020 much improved.  But in June 2021 - triglycerides again high and LDL running low 30s and so tried change to Zetia 10 mg daily  Initial recheck in August 2022 improved but LDL increasing and liver enzymes high in September 2023  CHANGE BACK TO ATORVASTATIN 10 mg daily in October 2023  LDL 54.4 - levels much better.  Recheck in October 2024          Obese  Body mass index is 38.87 kg/m².  Working on lifestyle modifications           ADHD   stable on current regimen Adderall XR 30 mg 2 capsules daily.    Patient is able to focus and complete tasks effectively.  No side effects reported.   Patient has intolerance to GENERIC Adderall XR in the past due to ineffectiveness and crash effect but has been taking the BRAND Adderall XR 30 mg 2 capsule daily " "since prior to 2017 without issue.  Stable.       Chronic GERD  Patient reports on PPI for multiple years  States he never had upper EGD  Reports Nexium 40 mg no longer covered - has to change to Omeprazole  Taking Omeprazole 40 mg daily  saw GI for EGD 8/21/23  EGD 11/29/23:  Mild Erythematous Gastropathy  Continue PPI  Stable.       Personal History of Colon Polyps  Last Colonoscopy 11/29/2023 - Dr. Alvarez  + polyps - repeat in 3 years - November 2026     Current smoker   for over 30 years - 1ppd smoker.    States he started smoking around age 23.    He declines the smoking cessation program.    CT lung screen 4/22/2024:  FINDINGS: Exam is negative. No significant pulmonary nodule identified.   Repeat April 2025       IFG   with HgbA1C 5.6% in September 2023  No history of elevations   and A1C 5.6% January 2024  RESOLVED - recheck in October 2024             Elevated liver enzymes  AST 81 and  in September 2023 = no history of elevations since 2018 and before.  No recent changes in medication  No recent illness reported  Levels improved but still elevated in January 2024  Plan to repeat hepatic function, hepatitis panel and liver ultrasound in 3 months   LIVER ENZYMES are now NORMAL in April 2024  Hepatitis panel is negative  Liver ultrasound was NOT done but since labs are okay - we can hold off on imaging.  Repeat labs in October 2024       Component      Latest Ref Rng 4/19/2024   Hepatitis B Surface Ag      Non-reactive  Non-reactive    Hep B C IgM      Non-reactive  Non-reactive    Hep A IgM      Non-reactive  Non-reactive    Hepatitis C Ab      Non-reactive  Non-reactive            Vitals:    07/03/24 0709   BP: 124/82   BP Location: Left arm   Patient Position: Sitting   BP Method: Large (Manual)   Pulse: 75   Temp: 98.4 °F (36.9 °C)   TempSrc: Temporal   SpO2: 98%   Weight: 119.4 kg (263 lb 3.7 oz)   Height: 5' 9" (1.753 m)       Assessment:         ICD-10-CM ICD-9-CM   1. ADHD " "(attention deficit hyperactivity disorder), inattentive type  F90.0 314.00   2. Aortic atherosclerosis  I70.0 440.0   3. Primary hypertension  I10 401.9   4. Mixed hyperlipidemia  E78.2 272.2   5. Class 2 severe obesity due to excess calories with serious comorbidity and body mass index (BMI) of 38.0 to 38.9 in adult  E66.01 278.01    Z68.38 V85.38   6. History of colon polyps  Z86.010 V12.72   7. Gastroesophageal reflux disease, unspecified whether esophagitis present  K21.9 530.81   8. Elevated liver enzymes  R74.8 790.5   9. Tobacco user  Z72.0 305.1   10. Encounter for blood test for routine general physical examination  Z00.00 V72.62   11. Screening for deficiency anemia  Z13.0 V78.1   12. Thyroid disorder screen  Z13.29 V77.0   13. Screening PSA (prostate specific antigen)  Z12.5 V76.44   14. Diabetes mellitus screening  Z13.1 V77.1       Plan:       1. ADHD (attention deficit hyperactivity disorder), inattentive type  Overview:  stable on current regimen Adderall XR 30 mg 2 capsules daily.    Patient is able to focus and complete tasks effectively.  No side effects reported.   Patient has intolerance to GENERIC Adderall XR in the past due to ineffectiveness and crash effect but has been taking the BRAND Adderall XR 30 mg 2 capsule daily since prior to 2017 without issue.  Stable.  /82 (BP Location: Left arm, Patient Position: Sitting, BP Method: Large (Manual))   Pulse 75   Temp 98.4 °F (36.9 °C) (Temporal)   Ht 5' 9" (1.753 m)   Wt 119.4 kg (263 lb 3.7 oz)   SpO2 98%   BMI 38.87 kg/m²       Orders:  -     ADDERALL XR 30 mg 24 hr capsule; Take 2 capsules (60 mg total) by mouth every morning. BRAND ONLY  Dispense: 180 capsule; Refill: 0    2. Aortic atherosclerosis  Overview:  Seen on CT lung screening 4/2024  Taking ASA 81 mg daily  Already on Statin therapy  with LDL 54.4      3. Primary hypertension  Overview:  Intolerance to Rosuvastatin due to acute itching  On Pravastatin 2019 - June 2020 - " triglycerides too high  Change to Atorvastatin June 2020 and levels in October 2020 much improved.  But in June 2021 - triglycerides again high and LDL running low 30s and so tried change to Zetia 10 mg daily  Initial recheck in August 2022 improved but LDL increasing and liver enzymes high in September 2023  CHANGE BACK TO ATORVASTATIN 10 mg daily in October 2023  LDL 54.4 - levels much better.  Recheck in October 2024        4. Mixed hyperlipidemia  Overview:  Intolerance to Rosuvastatin due to acute itching  On Pravastatin 2019 - June 2020 - triglycerides too high  Change to Atorvastatin June 2020 and levels in October 2020 much improved.  But in June 2021 - triglycerides again high and LDL running low 30s and so tried change to Zetia 10 mg daily  Initial recheck in August 2022 improved but LDL increasing and liver enzymes high in September 2023  CHANGE BACK TO ATORVASTATIN 10 mg daily in October 2023  LDL 54.4 - levels much better.  Recheck in October 2024      Orders:  -     Lipid Panel; Future; Expected date: 07/03/2024    5. Class 2 severe obesity due to excess calories with serious comorbidity and body mass index (BMI) of 38.0 to 38.9 in adult  Overview:   Body mass index is 38.87 kg/m².  Working on lifestyle modifications      6. History of colon polyps  Overview:  Last Colonoscopy 11/29/2023 - Dr. Alvarez  + polyps - repeat in 3 years - November 2026        7. Gastroesophageal reflux disease, unspecified whether esophagitis present  Overview:  Patient reports on PPI for multiple years  States he never had upper EGD  Reports Nexium 40 mg no longer covered - has to change to Omeprazole  Taking Omeprazole 40 mg daily  saw GI for EGD 8/21/23  EGD 11/29/23:  Mild Erythematous Gastropathy  Continue PPI  Stable.        8. Elevated liver enzymes  Overview:  AST 81 and  in September 2023 = no history of elevations since 2018 and before.  No recent changes in medication  No recent illness reported  Levels  improved but still elevated in January 2024  Plan to repeat hepatic function, hepatitis panel and liver ultrasound in 3 months   LIVER ENZYMES are now NORMAL in April 2024  Hepatitis panel is negative  Liver ultrasound was NOT done but since labs are okay - we can hold off on imaging.  Repeat labs in October 2024       Component      Latest Ref Rng 4/19/2024   Hepatitis B Surface Ag      Non-reactive  Non-reactive    Hep B C IgM      Non-reactive  Non-reactive    Hep A IgM      Non-reactive  Non-reactive    Hepatitis C Ab      Non-reactive  Non-reactive            9. Tobacco user  Overview:  Current smoker   for over 30 years - 1ppd smoker.    States he started smoking around age 23.    He declines the smoking cessation program.    CT lung screen 4/22/2024:  FINDINGS: Exam is negative. No significant pulmonary nodule identified.   Repeat April 2025      10. Encounter for blood test for routine general physical examination  -     CBC Auto Differential; Future; Expected date: 07/03/2024  -     Comprehensive Metabolic Panel; Future; Expected date: 07/03/2024  -     Hemoglobin A1C; Future; Expected date: 07/03/2024  -     Lipid Panel; Future; Expected date: 07/03/2024  -     TSH; Future; Expected date: 07/03/2024  -     PSA, Screening; Future; Expected date: 07/03/2024    11. Screening for deficiency anemia  -     CBC Auto Differential; Future; Expected date: 07/03/2024    12. Thyroid disorder screen  -     TSH; Future; Expected date: 07/03/2024    13. Screening PSA (prostate specific antigen)  -     PSA, Screening; Future; Expected date: 07/03/2024    14. Diabetes mellitus screening  -     Comprehensive Metabolic Panel; Future; Expected date: 07/03/2024  -     Hemoglobin A1C; Future; Expected date: 07/03/2024       Follow up in about 3 months (around 10/3/2024) for fasting labs and WELLNESS exam.     Patient's Medications   New Prescriptions    No medications on file   Previous Medications    AMLODIPINE (NORVASC) 10 MG  TABLET    Take 1 tablet (10 mg total) by mouth once daily.    ATORVASTATIN (LIPITOR) 10 MG TABLET    Take 1 tablet (10 mg total) by mouth once daily.    DOXAZOSIN (CARDURA) 8 MG TAB    Take 1 tablet (8 mg total) by mouth once daily.    FISH OIL-OMEGA-3 FATTY ACIDS 300-1,000 MG CAPSULE    Take 1 g by mouth once daily.    LISINOPRIL (PRINIVIL,ZESTRIL) 40 MG TABLET    Take 1 tablet (40 mg total) by mouth once daily.    NIACIN 500 MG TAB    Take 1 tablet (500 mg total) by mouth every evening.    OMEPRAZOLE (PRILOSEC) 40 MG CAPSULE    Take 1 capsule (40 mg total) by mouth once daily.    TRIAMTERENE-HYDROCHLOROTHIAZIDE 37.5-25 MG (MAXZIDE-25) 37.5-25 MG PER TABLET    Take 1 tablet by mouth once daily.   Modified Medications    Modified Medication Previous Medication    ADDERALL XR 30 MG 24 HR CAPSULE ADDERALL XR 30 mg 24 hr capsule       Take 2 capsules (60 mg total) by mouth every morning. BRAND ONLY    Take 2 capsules (60 mg total) by mouth every morning. BRAND ONLY   Discontinued Medications    No medications on file         Review of Systems   HENT: Negative.     Respiratory: Negative.     Cardiovascular: Negative.    Gastrointestinal: Negative.          Objective:        Physical Exam  Constitutional:       General: He is not in acute distress.     Appearance: Normal appearance. He is obese. He is not toxic-appearing or diaphoretic.      Comments: Body mass index is 38.87 kg/m².           HENT:      Head: Normocephalic and atraumatic.   Eyes:      Extraocular Movements: Extraocular movements intact.      Conjunctiva/sclera: Conjunctivae normal.   Cardiovascular:      Rate and Rhythm: Normal rate and regular rhythm.      Heart sounds: Normal heart sounds.   Pulmonary:      Effort: Pulmonary effort is normal. No respiratory distress.      Breath sounds: Normal breath sounds.   Abdominal:      General: There is no distension.   Skin:     General: Skin is warm and dry.   Neurological:      Mental Status: He is alert and  oriented to person, place, and time.   Psychiatric:         Mood and Affect: Mood normal.         Behavior: Behavior normal.         Thought Content: Thought content normal.             Past Medical History:   Diagnosis Date    Adult ADHD     Alcohol dependency 2015    last alcohol intake 2015    Aortic atherosclerosis 2024    Seen on CT lung screening 2024  Advised to start ASA 81 mg daily  Already on Statin therapy and stable.      Colon polyp 2018    2 polyps - tubular adenoma - repeat colonoscopy in 5 years    GERD (gastroesophageal reflux disease)     Hyperlipidemia     Hypertension        Past Surgical History:   Procedure Laterality Date    COLONOSCOPY N/A 2018    Procedure: COLONOSCOPY;  Surgeon: Eduard Medley MD;  Location: [facility] ENDO;  Service: Endoscopy;  Laterality: N/A;    COLONOSCOPY N/A 2023    Procedure: COLONOSCOPY;  Surgeon: Jace Prince MD;  Location: [facility] (2ND FLR);  Service: Endoscopy;  Laterality: N/A;  Dr. Ren refer, pt requesting this date, ext/constipation prepMiralax instru via portal-KP  2nd flr-airway, >BMI possible sleep apnea per Dr. Ren  -precall complete-MS    ESOPHAGOGASTRODUODENOSCOPY N/A 2023    Procedure: EGD (ESOPHAGOGASTRODUODENOSCOPY);  Surgeon: Jace Prince MD;  [facility]  Service: Endoscopy;  Laterality: N/A;    HERNIA REPAIR      INGUINAL HERNIA REPAIR Left     done at [facility]    KNEE SURGERY Right     VASECTOMY         Family History   Problem Relation Name Age of Onset    Hypertension Father Jose     Arthritis Mother Gwendolyn     Depression Mother Gwendolyn     Drug abuse Mother Gwendolyn     Hyperlipidemia Mother Gwendolyn     Hypertension Mother Gwendolyn     Mental illness Mother Gwendolyn     Heart disease Brother Luiz         acute MI -  at at 46    Alcohol abuse Brother Luiz     Early death Brother Luiz     Mental illness Brother Luiz     Cancer Maternal Grandmother  Danielle     No Known Problems Maternal Grandfather      Heart disease Paternal Grandmother Danielle     Alcohol abuse Paternal Grandfather Jose        Social History     Socioeconomic History    Marital status:    Occupational History    Occupation:    Tobacco Use    Smoking status: Every Day     Current packs/day: 0.50     Average packs/day: 1 pack/day for 35.7 years (35.6 ttl pk-yrs)     Types: Cigarettes     Start date: 10/12/1988    Smokeless tobacco: Never   Substance and Sexual Activity    Alcohol use: Yes     Alcohol/week: 8.0 standard drinks of alcohol     Types: 8 Cans of beer per week     Comment: every weekend - a couple beers per weekend night    Drug use: No    Sexual activity: Yes     Partners: Female     Social Determinants of Health     Financial Resource Strain: Low Risk  (7/2/2024)    Overall Financial Resource Strain (CARDIA)     Difficulty of Paying Living Expenses: Not hard at all   Food Insecurity: No Food Insecurity (7/2/2024)    Hunger Vital Sign     Worried About Running Out of Food in the Last Year: Never true     Ran Out of Food in the Last Year: Never true   Transportation Needs: No Transportation Needs (8/21/2023)    PRAPARE - Transportation     Lack of Transportation (Medical): No     Lack of Transportation (Non-Medical): No   Physical Activity: Insufficiently Active (7/2/2024)    Exercise Vital Sign     Days of Exercise per Week: 3 days     Minutes of Exercise per Session: 30 min   Stress: No Stress Concern Present (7/2/2024)    [employer] - Occupational Stress Questionnaire     Feeling of Stress : Not at all   Housing Stability: Low Risk  (8/21/2023)    Housing Stability Vital Sign     Unable to Pay for Housing in the Last Year: No     Number of Places Lived in the Last Year: 1     Unstable Housing in the Last Year: No

## 2024-08-15 DIAGNOSIS — I10 ESSENTIAL HYPERTENSION: ICD-10-CM

## 2024-08-16 RX ORDER — TRIAMTERENE/HYDROCHLOROTHIAZID 37.5-25 MG
1 TABLET ORAL DAILY
Qty: 90 TABLET | Refills: 3 | Status: SHIPPED | OUTPATIENT
Start: 2024-08-16 | End: 2025-08-16

## 2024-10-02 ENCOUNTER — TELEPHONE (OUTPATIENT)
Dept: FAMILY MEDICINE | Facility: CLINIC | Age: 59
End: 2024-10-02
Payer: COMMERCIAL

## 2024-10-02 NOTE — TELEPHONE ENCOUNTER
----- Message from Aye sent at 10/2/2024  2:03 PM CDT -----  Type:  Sooner Apoointment Request    Caller is requesting a sooner appointment.  Caller declined first available appointment listed below.  Caller will not accept being placed on the waitlist and is requesting a message be sent to doctor.  Name of Caller: Pt   When is the first available appointment? 11/04  Symptoms: refills / annual   Would the patient rather a call back or a response via [facility]? Call   Best Call Back Number:[phone]   Additional Information:

## 2024-10-02 NOTE — TELEPHONE ENCOUNTER
----- Message from Wisam sent at 10/2/2024  1:56 PM CDT -----  Contact: pt  Type:  Patient Returning Call    Who Called:PT   Who Left Message for Patient: Jordana  Does the patient know what this is regarding?: yes  Would the patient rather a call back or a response via [facility]? call  Best Call Back Number:[phone]   Additional Information:

## 2024-10-21 DIAGNOSIS — F90.0 ADHD (ATTENTION DEFICIT HYPERACTIVITY DISORDER), INATTENTIVE TYPE: ICD-10-CM

## 2024-10-21 DIAGNOSIS — K21.9 CHRONIC GERD: ICD-10-CM

## 2024-10-21 DIAGNOSIS — I10 ESSENTIAL HYPERTENSION: ICD-10-CM

## 2024-10-21 DIAGNOSIS — E78.2 MIXED HYPERLIPIDEMIA: ICD-10-CM

## 2024-10-21 RX ORDER — DOXAZOSIN 8 MG/1
8 TABLET ORAL DAILY
Qty: 90 TABLET | Refills: 3 | Status: SHIPPED | OUTPATIENT
Start: 2024-10-21

## 2024-10-21 RX ORDER — DEXTROAMPHETAMINE SULFATE, DEXTROAMPHETAMINE SACCHARATE, AMPHETAMINE SULFATE AND AMPHETAMINE ASPARTATE 7.5; 7.5; 7.5; 7.5 MG/1; MG/1; MG/1; MG/1
60 CAPSULE, EXTENDED RELEASE ORAL EVERY MORNING
Qty: 180 CAPSULE | Refills: 0 | Status: CANCELLED | OUTPATIENT
Start: 2024-10-21

## 2024-10-21 RX ORDER — AMLODIPINE BESYLATE 10 MG/1
10 TABLET ORAL DAILY
Qty: 90 TABLET | Refills: 3 | Status: SHIPPED | OUTPATIENT
Start: 2024-10-21

## 2024-10-21 RX ORDER — LISINOPRIL 40 MG/1
40 TABLET ORAL DAILY
Qty: 90 TABLET | Refills: 3 | Status: SHIPPED | OUTPATIENT
Start: 2024-10-21

## 2024-10-21 RX ORDER — OMEPRAZOLE 40 MG/1
40 CAPSULE, DELAYED RELEASE ORAL DAILY
Qty: 90 CAPSULE | Refills: 3 | Status: SHIPPED | OUTPATIENT
Start: 2024-10-21 | End: 2025-10-21

## 2024-10-21 RX ORDER — ATORVASTATIN CALCIUM 10 MG/1
10 TABLET, FILM COATED ORAL DAILY
Qty: 90 TABLET | Refills: 3 | Status: SHIPPED | OUTPATIENT
Start: 2024-10-21 | End: 2025-10-21

## 2024-10-21 RX ORDER — TRIAMTERENE/HYDROCHLOROTHIAZID 37.5-25 MG
1 TABLET ORAL DAILY
Qty: 90 TABLET | Refills: 3 | Status: SHIPPED | OUTPATIENT
Start: 2024-10-21 | End: 2025-10-21

## 2024-10-29 ENCOUNTER — OFFICE VISIT (OUTPATIENT)
Dept: FAMILY MEDICINE | Facility: CLINIC | Age: 59
End: 2024-10-29
Payer: COMMERCIAL

## 2024-10-29 VITALS
HEIGHT: 69 IN | WEIGHT: 264.88 LBS | TEMPERATURE: 98 F | DIASTOLIC BLOOD PRESSURE: 78 MMHG | SYSTOLIC BLOOD PRESSURE: 116 MMHG | BODY MASS INDEX: 39.23 KG/M2 | HEART RATE: 100 BPM | OXYGEN SATURATION: 96 %

## 2024-10-29 DIAGNOSIS — I70.0 AORTIC ATHEROSCLEROSIS: ICD-10-CM

## 2024-10-29 DIAGNOSIS — K21.9 GASTROESOPHAGEAL REFLUX DISEASE, UNSPECIFIED WHETHER ESOPHAGITIS PRESENT: ICD-10-CM

## 2024-10-29 DIAGNOSIS — Z86.0100 HISTORY OF COLON POLYPS: ICD-10-CM

## 2024-10-29 DIAGNOSIS — E66.812 CLASS 2 SEVERE OBESITY DUE TO EXCESS CALORIES WITH SERIOUS COMORBIDITY AND BODY MASS INDEX (BMI) OF 38.0 TO 38.9 IN ADULT: ICD-10-CM

## 2024-10-29 DIAGNOSIS — E87.0 SERUM SODIUM ELEVATED: ICD-10-CM

## 2024-10-29 DIAGNOSIS — Z23 FLU VACCINE NEED: ICD-10-CM

## 2024-10-29 DIAGNOSIS — Z00.00 ANNUAL PHYSICAL EXAM: Primary | ICD-10-CM

## 2024-10-29 DIAGNOSIS — F90.0 ADHD (ATTENTION DEFICIT HYPERACTIVITY DISORDER), INATTENTIVE TYPE: ICD-10-CM

## 2024-10-29 DIAGNOSIS — B37.0 ORAL THRUSH: ICD-10-CM

## 2024-10-29 DIAGNOSIS — I10 PRIMARY HYPERTENSION: ICD-10-CM

## 2024-10-29 DIAGNOSIS — E66.01 CLASS 2 SEVERE OBESITY DUE TO EXCESS CALORIES WITH SERIOUS COMORBIDITY AND BODY MASS INDEX (BMI) OF 38.0 TO 38.9 IN ADULT: ICD-10-CM

## 2024-10-29 DIAGNOSIS — Z72.0 TOBACCO USER: ICD-10-CM

## 2024-10-29 DIAGNOSIS — E78.2 MIXED HYPERLIPIDEMIA: ICD-10-CM

## 2024-10-29 PROBLEM — R74.8 ELEVATED LIVER ENZYMES: Status: RESOLVED | Noted: 2024-01-19 | Resolved: 2024-10-29

## 2024-10-29 PROCEDURE — 90471 IMMUNIZATION ADMIN: CPT | Mod: S$GLB,,, | Performed by: NURSE PRACTITIONER

## 2024-10-29 PROCEDURE — 3008F BODY MASS INDEX DOCD: CPT | Mod: CPTII,S$GLB,, | Performed by: NURSE PRACTITIONER

## 2024-10-29 PROCEDURE — 99396 PREV VISIT EST AGE 40-64: CPT | Mod: 25,S$GLB,, | Performed by: NURSE PRACTITIONER

## 2024-10-29 PROCEDURE — 87070 CULTURE OTHR SPECIMN AEROBIC: CPT | Performed by: NURSE PRACTITIONER

## 2024-10-29 PROCEDURE — 90656 IIV3 VACC NO PRSV 0.5 ML IM: CPT | Mod: S$GLB,,, | Performed by: NURSE PRACTITIONER

## 2024-10-29 PROCEDURE — 3044F HG A1C LEVEL LT 7.0%: CPT | Mod: CPTII,S$GLB,, | Performed by: NURSE PRACTITIONER

## 2024-10-29 PROCEDURE — 4010F ACE/ARB THERAPY RXD/TAKEN: CPT | Mod: CPTII,S$GLB,, | Performed by: NURSE PRACTITIONER

## 2024-10-29 PROCEDURE — 3078F DIAST BP <80 MM HG: CPT | Mod: CPTII,S$GLB,, | Performed by: NURSE PRACTITIONER

## 2024-10-29 PROCEDURE — 99999 PR PBB SHADOW E&M-EST. PATIENT-LVL IV: CPT | Mod: PBBFAC,,, | Performed by: NURSE PRACTITIONER

## 2024-10-29 PROCEDURE — 1160F RVW MEDS BY RX/DR IN RCRD: CPT | Mod: CPTII,S$GLB,, | Performed by: NURSE PRACTITIONER

## 2024-10-29 PROCEDURE — 1159F MED LIST DOCD IN RCRD: CPT | Mod: CPTII,S$GLB,, | Performed by: NURSE PRACTITIONER

## 2024-10-29 PROCEDURE — 3074F SYST BP LT 130 MM HG: CPT | Mod: CPTII,S$GLB,, | Performed by: NURSE PRACTITIONER

## 2024-10-29 RX ORDER — NYSTATIN 100000 [USP'U]/ML
6 SUSPENSION ORAL
Qty: 336 ML | Refills: 0 | Status: SHIPPED | OUTPATIENT
Start: 2024-10-29 | End: 2024-11-12

## 2024-10-29 RX ORDER — HYDROCHLOROTHIAZIDE 25 MG/1
25 TABLET ORAL DAILY
Qty: 30 TABLET | Refills: 0 | Status: SHIPPED | OUTPATIENT
Start: 2024-10-29 | End: 2025-10-29

## 2024-10-29 RX ORDER — DEXTROAMPHETAMINE SULFATE, DEXTROAMPHETAMINE SACCHARATE, AMPHETAMINE SULFATE AND AMPHETAMINE ASPARTATE 7.5; 7.5; 7.5; 7.5 MG/1; MG/1; MG/1; MG/1
60 CAPSULE, EXTENDED RELEASE ORAL EVERY MORNING
Qty: 180 CAPSULE | Refills: 0 | Status: SHIPPED | OUTPATIENT
Start: 2024-10-29

## 2024-10-31 LAB — BACTERIA THROAT CULT: NORMAL

## 2024-11-14 NOTE — ANESTHESIA POSTPROCEDURE EVALUATION
"Anesthesia Post Evaluation    Patient: Nick Mcdonough     Procedure(s) Performed: Procedure(s) (LRB):  COLONOSCOPY (N/A)    Final Anesthesia Type: MAC  Patient location during evaluation: GI PACU  Patient participation: Yes- Able to Participate  Level of consciousness: awake and alert  Post-procedure vital signs: reviewed and stable  Pain management: adequate  Airway patency: patent  PONV status at discharge: No PONV  Anesthetic complications: no      Cardiovascular status: hemodynamically stable  Respiratory status: unassisted and spontaneous ventilation  Hydration status: euvolemic  Follow-up not needed.        Visit Vitals  /74   Pulse 61   Temp 37.2 °C (98.9 °F) (Oral)   Resp 20   Ht 5' 10" (1.778 m)   Wt 106.6 kg (235 lb)   SpO2 98%   BMI 33.72 kg/m²       Pain/Inocente Score: Presence of Pain: bernadette (2/2/2018  9:20 AM)      "
Universal Safety Interventions

## 2024-11-21 ENCOUNTER — OFFICE VISIT (OUTPATIENT)
Dept: FAMILY MEDICINE | Facility: CLINIC | Age: 59
End: 2024-11-21
Payer: COMMERCIAL

## 2024-11-21 VITALS
OXYGEN SATURATION: 98 % | HEIGHT: 69 IN | HEART RATE: 85 BPM | DIASTOLIC BLOOD PRESSURE: 76 MMHG | TEMPERATURE: 98 F | BODY MASS INDEX: 39.82 KG/M2 | WEIGHT: 268.88 LBS | SYSTOLIC BLOOD PRESSURE: 126 MMHG

## 2024-11-21 DIAGNOSIS — E66.01 CLASS 2 SEVERE OBESITY DUE TO EXCESS CALORIES WITH SERIOUS COMORBIDITY AND BODY MASS INDEX (BMI) OF 39.0 TO 39.9 IN ADULT: ICD-10-CM

## 2024-11-21 DIAGNOSIS — E87.6 LOW SERUM POTASSIUM: ICD-10-CM

## 2024-11-21 DIAGNOSIS — E66.812 CLASS 2 SEVERE OBESITY DUE TO EXCESS CALORIES WITH SERIOUS COMORBIDITY AND BODY MASS INDEX (BMI) OF 39.0 TO 39.9 IN ADULT: ICD-10-CM

## 2024-11-21 DIAGNOSIS — I10 PRIMARY HYPERTENSION: Primary | ICD-10-CM

## 2024-11-21 DIAGNOSIS — I70.0 AORTIC ATHEROSCLEROSIS: ICD-10-CM

## 2024-11-21 DIAGNOSIS — E78.2 MIXED HYPERLIPIDEMIA: ICD-10-CM

## 2024-11-21 PROCEDURE — 3074F SYST BP LT 130 MM HG: CPT | Mod: CPTII,S$GLB,, | Performed by: NURSE PRACTITIONER

## 2024-11-21 PROCEDURE — 99214 OFFICE O/P EST MOD 30 MIN: CPT | Mod: S$GLB,,, | Performed by: NURSE PRACTITIONER

## 2024-11-21 PROCEDURE — 3044F HG A1C LEVEL LT 7.0%: CPT | Mod: CPTII,S$GLB,, | Performed by: NURSE PRACTITIONER

## 2024-11-21 PROCEDURE — 99999 PR PBB SHADOW E&M-EST. PATIENT-LVL IV: CPT | Mod: PBBFAC,,, | Performed by: NURSE PRACTITIONER

## 2024-11-21 PROCEDURE — 1159F MED LIST DOCD IN RCRD: CPT | Mod: CPTII,S$GLB,, | Performed by: NURSE PRACTITIONER

## 2024-11-21 PROCEDURE — 3078F DIAST BP <80 MM HG: CPT | Mod: CPTII,S$GLB,, | Performed by: NURSE PRACTITIONER

## 2024-11-21 PROCEDURE — 3008F BODY MASS INDEX DOCD: CPT | Mod: CPTII,S$GLB,, | Performed by: NURSE PRACTITIONER

## 2024-11-21 PROCEDURE — 4010F ACE/ARB THERAPY RXD/TAKEN: CPT | Mod: CPTII,S$GLB,, | Performed by: NURSE PRACTITIONER

## 2024-11-21 PROCEDURE — 1160F RVW MEDS BY RX/DR IN RCRD: CPT | Mod: CPTII,S$GLB,, | Performed by: NURSE PRACTITIONER

## 2024-11-23 RX ORDER — POTASSIUM CHLORIDE 20 MEQ/1
20 TABLET, EXTENDED RELEASE ORAL DAILY
Qty: 90 TABLET | Refills: 0 | Status: SHIPPED | OUTPATIENT
Start: 2024-11-23

## 2024-11-23 RX ORDER — HYDROCHLOROTHIAZIDE 25 MG/1
25 TABLET ORAL DAILY
Qty: 90 TABLET | Refills: 0 | Status: SHIPPED | OUTPATIENT
Start: 2024-11-23 | End: 2025-11-23

## 2024-11-23 NOTE — PROGRESS NOTES
"Subjective:       Patient ID: Nick Mcdonough Sr. is a 59 y.o. male.    Chief Complaint: Follow-up (4 weeks F/U)        HPI WITH ASSESSMENT AND PLAN OF CARE:      Patient is a 59-year-old white male with Aortic Atherosclerosis, Hypertension, Hyperlipidemia, chronic GERD, personal history of colon polyps, ADHD, tobacco user, fluid retention to lower extremities with venous stasis dermatitis, and obesity that is here today for  4 week follow up.        Aortic Atherosclerosis  Seen on CT lung screening 4/2024  Taking ASA 81 mg daily  Already on Statin therapy with LDL 58  Stable.        Hypertension  Currently taking Lisinopril 40 mg daily, HCT 25 mg daily, Doxazosin 8 mg daily and Amlodipine 10 mg daily.   STOPPED the MAXZIDE 25 mg daily and change to HCTZ 25 mg daily on 10/2/24 due to elevated sodium levels.  Sodium level is now improved but potassium level is low  Will start K-Dur 20 meq daily supplement  Recheck in 8 weeks.  /76 (BP Location: Left arm, Patient Position: Sitting)   Pulse 85   Temp 98.2 °F (36.8 °C) (Temporal)   Ht 5' 9" (1.753 m)   Wt 122 kg (268 lb 13.6 oz)   SpO2 98%   BMI 39.70 kg/m²              Elevated Serum Sodium levels  Sodium 147 in October 2024  STOPPED the MAXZIDE 25 mg daily and changed to HCTZ 25 mg daily  Decreased sodium in diet  11/21/24 - sodium level improved.  Recheck in 8 weeks.            LOW POTASSIUM  STOPPED the MAXZIDE 25 mg daily and change to HCTZ 25 mg daily on 10/2/24 due to elevated sodium levels.  Sodium level is now improved but potassium level is low  Will start K-Dur 20 meq daily supplement  Recheck in 8 weeks.        Hyperlipidemia  Intolerance to Rosuvastatin due to acute itching  On Pravastatin 2019 - June 2020 - triglycerides too high  Change to Atorvastatin June 2020 and levels in October 2020 much improved.  Zetia with LDL uncontrolled with elevated liver enzymes 2022/2023  CHANGE BACK TO ATORVASTATIN 10 mg daily in October 2023  LDL " 58  Stable  Recheck in October 2025             Obese  Body mass index is 39.7 kg/m².  Working on lifestyle modifications            ADHD   stable on current regimen Adderall XR 30 mg 2 capsules daily.    Patient is able to focus and complete tasks effectively.  No side effects reported.   Patient has intolerance to GENERIC Adderall XR in the past due to ineffectiveness and crash effect but has been taking the BRAND Adderall XR 30 mg 2 capsule daily since prior to 2017 without issue.  Stable.  Recheck in 2 months.        Chronic GERD  Patient reports on PPI for multiple years  Reports Nexium 40 mg no longer covered - has to change to Omeprazole  Taking Omeprazole 40 mg daily  saw GI for EGD 8/21/23  EGD 11/29/23:  Mild Erythematous Gastropathy  Continue PPI  Stable.        Personal History of Colon Polyps  Last Colonoscopy 11/29/2023 - Dr. Alvarez  + polyps - repeat in 3 years - November 2026        Current smoker   for over 30 years - 1ppd smoker.    States he started smoking around age 23.    He declines the smoking cessation program.    CT lung screen 4/22/2024:  FINDINGS: Exam is negative. No significant pulmonary nodule identified.   Repeat April 2025        IFG   with HgbA1C 5.6% in September 2023  No history of elevations  FBG 98 and A1C 5.3%   Stable.             History of Elevated liver enzymes  AST 81 and  in September 2023 = no history of elevations since 2018 and before.  No recent changes in medication  No recent illness reported  Levels improved but still elevated in January 2024  Plan to repeat hepatic function, hepatitis panel and liver ultrasound in 3 months   LIVER ENZYMES are now NORMAL in April 2024  Hepatitis panel is negative  Liver ultrasound was NOT done but since labs are okay - we can hold off on imaging.  Repeat labs in October 2024 STABLE  Can now monitor yearly               Vitals:    11/21/24 1550   BP: 126/76   BP Location: Left arm   Patient Position: Sitting   Pulse:  "85   Temp: 98.2 °F (36.8 °C)   TempSrc: Temporal   SpO2: 98%   Weight: 122 kg (268 lb 13.6 oz)   Height: 5' 9" (1.753 m)         Diagnoses this Encounter:         ICD-10-CM ICD-9-CM   1. Primary hypertension  I10 401.9   2. Low serum potassium  E87.6 276.8   3. Aortic atherosclerosis  I70.0 440.0   4. Mixed hyperlipidemia  E78.2 272.2   5. Class 2 severe obesity due to excess calories with serious comorbidity and body mass index (BMI) of 39.0 to 39.9 in adult  E66.812 278.01    E66.01 V85.39    Z68.39        Orders Placed This Encounter    BASIC METABOLIC PANEL    potassium chloride SA (K-DUR,KLOR-CON) 20 MEQ tablet    hydroCHLOROthiazide (HYDRODIURIL) 25 MG tablet        Follow up in about 8 weeks (around 1/16/2025) for fasting lab and follow up.     Patient's Medications   New Prescriptions    POTASSIUM CHLORIDE SA (K-DUR,KLOR-CON) 20 MEQ TABLET    Take 1 tablet (20 mEq total) by mouth once daily.   Previous Medications    ADDERALL XR 30 MG 24 HR CAPSULE    Take 2 capsules (60 mg total) by mouth every morning. BRAND ONLY    AMLODIPINE (NORVASC) 10 MG TABLET    Take 1 tablet (10 mg total) by mouth once daily.    ATORVASTATIN (LIPITOR) 10 MG TABLET    Take 1 tablet (10 mg total) by mouth once daily.    DOXAZOSIN (CARDURA) 8 MG TAB    Take 1 tablet (8 mg total) by mouth once daily.    FISH OIL-OMEGA-3 FATTY ACIDS 300-1,000 MG CAPSULE    Take 1 g by mouth once daily.    LISINOPRIL (PRINIVIL,ZESTRIL) 40 MG TABLET    Take 1 tablet (40 mg total) by mouth once daily.    NIACIN 500 MG TAB    Take 1 tablet (500 mg total) by mouth every evening.    OMEPRAZOLE (PRILOSEC) 40 MG CAPSULE    Take 1 capsule (40 mg total) by mouth once daily.   Modified Medications    Modified Medication Previous Medication    HYDROCHLOROTHIAZIDE (HYDRODIURIL) 25 MG TABLET hydroCHLOROthiazide (HYDRODIURIL) 25 MG tablet       Take 1 tablet (25 mg total) by mouth once daily.    Take 1 tablet (25 mg total) by mouth once daily.   Discontinued " Medications    No medications on file         Review of Systems   HENT: Negative.     Respiratory: Negative.     Cardiovascular: Negative.    Gastrointestinal: Negative.          Objective:        Physical Exam  Constitutional:       General: He is not in acute distress.     Appearance: Normal appearance. He is obese. He is not toxic-appearing or diaphoretic.      Comments: Body mass index is 39.7 kg/m².             HENT:      Head: Normocephalic and atraumatic.   Eyes:      Extraocular Movements: Extraocular movements intact.      Conjunctiva/sclera: Conjunctivae normal.   Cardiovascular:      Rate and Rhythm: Normal rate and regular rhythm.      Heart sounds: Normal heart sounds.   Pulmonary:      Effort: Pulmonary effort is normal. No respiratory distress.      Breath sounds: Normal breath sounds.   Abdominal:      General: There is no distension.   Skin:     General: Skin is warm and dry.   Neurological:      Mental Status: He is alert and oriented to person, place, and time.   Psychiatric:         Mood and Affect: Mood normal.         Behavior: Behavior normal.         Thought Content: Thought content normal.             Past Medical History:   Diagnosis Date    Adult ADHD     Alcohol dependency 09/2015    last alcohol intake September 2015    Aortic atherosclerosis 05/02/2024    Seen on CT lung screening 4/2024  Advised to start ASA 81 mg daily  Already on Statin therapy and stable.      Colon polyp 02/02/2018    2 polyps - tubular adenoma - repeat colonoscopy in 5 years    GERD (gastroesophageal reflux disease)     Hyperlipidemia     Hypertension        Past Surgical History:   Procedure Laterality Date    COLONOSCOPY N/A 2/2/2018    Procedure: COLONOSCOPY;  Surgeon: Eduard Medley MD;  [facility];  Service: Endoscopy;  Laterality: N/A;    COLONOSCOPY N/A 11/29/2023    Procedure: COLONOSCOPY;  Surgeon: Jace Prince MD;  Location: [facility] ([address] [place] [address];  Service: Endoscopy;  Laterality: N/A;   Dr. Ren refer, pt requesting this date, ext/constipation prepMiralax instru via portal-KP  2nd flr-airway, >BMI possible sleep apnea per Dr. Ren  -precall complete-MS    ESOPHAGOGASTRODUODENOSCOPY N/A 2023    Procedure: EGD (ESOPHAGOGASTRODUODENOSCOPY);  Surgeon: Jace Prince MD;  Location: [facility] ENDO (2ND FLR);  Service: Endoscopy;  Laterality: N/A;    HERNIA REPAIR      INGUINAL HERNIA REPAIR Left     done at [facility]    KNEE SURGERY Right     VASECTOMY         Family History   Problem Relation Name Age of Onset    Heart disease Mother Gwendolyn         AFIB    Arthritis Mother Gwendolyn     Depression Mother Gwendolyn     Drug abuse Mother Gwendolyn     Hyperlipidemia Mother Gwendolyn     Hypertension Mother Gwendolyn     Mental illness Mother Gwendolyn     Hypertension Father Jose     Heart disease Brother Luiz         acute MI -  at at 46    Alcohol abuse Brother Luiz     Early death Brother Luiz     Mental illness Brother Luiz     Cancer Maternal Grandmother Danielle     No Known Problems Maternal Grandfather      Heart disease Paternal Grandmother Danielle     Alcohol abuse Paternal Grandfather Jose        Social History     Socioeconomic History    Marital status:    Occupational History    Occupation:    Tobacco Use    Smoking status: Every Day     Current packs/day: 0.50     Average packs/day: 1 pack/day for 36.1 years (35.8 ttl pk-yrs)     Types: Cigarettes     Start date: 10/12/1988    Smokeless tobacco: Never   Substance and Sexual Activity    Alcohol use: Yes     Alcohol/week: 8.0 standard drinks of alcohol     Types: 8 Cans of beer per week     Comment: every weekend - a couple beers per weekend night    Drug use: No    Sexual activity: Yes     Partners: Female     Social Drivers of Health     Financial Resource Strain: Low Risk  (2024)    Overall Financial Resource Strain (CARDIA)     Difficulty of Paying Living Expenses: Not hard at all   Food  Insecurity: No Food Insecurity (7/2/2024)    Hunger Vital Sign     Worried About Running Out of Food in the Last Year: Never true     Ran Out of Food in the Last Year: Never true   Transportation Needs: No Transportation Needs (8/21/2023)    PRAPARE - Transportation     Lack of Transportation (Medical): No     Lack of Transportation (Non-Medical): No   Physical Activity: Insufficiently Active (7/2/2024)    Exercise Vital Sign     Days of Exercise per Week: 3 days     Minutes of Exercise per Session: 30 min   Stress: No Stress Concern Present (7/2/2024)    [employer] - Occupational Stress Questionnaire     Feeling of Stress : Not at all   Housing Stability: Low Risk  (8/21/2023)    Housing Stability Vital Sign     Unable to Pay for Housing in the Last Year: No     Number of Places Lived in the Last Year: 1     Unstable Housing in the Last Year: No

## 2024-11-25 ENCOUNTER — TELEPHONE (OUTPATIENT)
Dept: FAMILY MEDICINE | Facility: CLINIC | Age: 59
End: 2024-11-25
Payer: COMMERCIAL

## 2024-11-25 NOTE — TELEPHONE ENCOUNTER
----- Message from Giulia Solis NP sent at 11/23/2024 10:50 AM CST -----  Call patient and set up BMP lab and then follow up visit the week of January 16th.

## 2024-12-03 ENCOUNTER — OFFICE VISIT (OUTPATIENT)
Dept: URGENT CARE | Facility: CLINIC | Age: 59
End: 2024-12-03
Payer: COMMERCIAL

## 2024-12-03 VITALS
HEART RATE: 60 BPM | SYSTOLIC BLOOD PRESSURE: 126 MMHG | RESPIRATION RATE: 16 BRPM | OXYGEN SATURATION: 97 % | TEMPERATURE: 98 F | WEIGHT: 268.94 LBS | HEIGHT: 69 IN | DIASTOLIC BLOOD PRESSURE: 84 MMHG | BODY MASS INDEX: 39.83 KG/M2

## 2024-12-03 DIAGNOSIS — I10 HYPERTENSION, UNSPECIFIED TYPE: ICD-10-CM

## 2024-12-03 DIAGNOSIS — F90.9 ATTENTION DEFICIT HYPERACTIVITY DISORDER (ADHD), UNSPECIFIED ADHD TYPE: ICD-10-CM

## 2024-12-03 DIAGNOSIS — S99.922A FOOT INJURY, LEFT, INITIAL ENCOUNTER: ICD-10-CM

## 2024-12-03 DIAGNOSIS — M10.9 PODAGRA: Primary | ICD-10-CM

## 2024-12-03 DIAGNOSIS — E78.2 MIXED HYPERLIPIDEMIA: ICD-10-CM

## 2024-12-03 PROCEDURE — 73630 X-RAY EXAM OF FOOT: CPT | Mod: LT,S$GLB,, | Performed by: RADIOLOGY

## 2024-12-03 PROCEDURE — 84550 ASSAY OF BLOOD/URIC ACID: CPT | Performed by: PHYSICIAN ASSISTANT

## 2024-12-03 PROCEDURE — 99214 OFFICE O/P EST MOD 30 MIN: CPT | Mod: S$GLB,,, | Performed by: PHYSICIAN ASSISTANT

## 2024-12-03 RX ORDER — METHYLPREDNISOLONE 4 MG/1
TABLET ORAL
Qty: 21 EACH | Refills: 0 | Status: SHIPPED | OUTPATIENT
Start: 2024-12-03 | End: 2024-12-24

## 2024-12-03 NOTE — PROGRESS NOTES
"Subjective:      Patient ID: Nick Mcdonough Sr. is a 59 y.o. male.    Vitals:  height is 5' 9" (1.753 m) and weight is 122 kg (268 lb 15.4 oz). His oral temperature is 98 °F (36.7 °C). His blood pressure is 126/84 and his pulse is 60. His respiration is 16 and oxygen saturation is 97%.     Chief Complaint: Foot Swelling    Pt presents with left foot pain and swelling. Symptoms started last Tuesday. Pt states that his 4 smith bruised the top of his foot at home.    Patient provider note starts here:    Patient presents to the clinic with complaints of pain and swelling in the left 1st MTP joint for over a week now.  He believes he may have injured his foot when the shifter on his 4 smith became stuck and he had to push hard with his foot.  Denies associated numbness or tingling in the extremity.  Reports that the swelling has not gone down since the injury.  Denies a history of gout but does intermittently drank alcohol.  After I mentioned it, he reports that his right foot also get slightly swollen at times but he always thought it was due to the throttle as well.    Foot Injury   The incident occurred 5 to 7 days ago. The incident occurred at home. The pain is present in the left foot. The quality of the pain is described as aching. The pain is at a severity of 8/10. Pertinent negatives include no numbness. The symptoms are aggravated by palpation, weight bearing and movement.       Constitution: Negative for chills and fever.   HENT:  Negative for sore throat.    Neck: Negative for neck pain and neck stiffness.   Cardiovascular:  Negative for chest pain.   Respiratory:  Negative for chest tightness, cough and wheezing.    Gastrointestinal:  Negative for abdominal pain, vomiting and diarrhea.   Musculoskeletal:  Positive for pain and trauma (possible, unsure). Negative for gout.   Skin:  Positive for erythema. Negative for rash and wound.   Allergic/Immunologic: Negative for itching.   Neurological:  " Negative for numbness and tingling.      Objective:     Physical Exam   Constitutional: He is oriented to person, place, and time. He appears well-developed.  Non-toxic appearance. He does not appear ill. No distress.   HENT:   Head: Normocephalic and atraumatic. Head is without abrasion, without contusion and without laceration.   Ears:   Right Ear: External ear normal.   Left Ear: External ear normal.   Nose: Nose normal.   Mouth/Throat: Oropharynx is clear and moist and mucous membranes are normal.   Eyes: Conjunctivae, EOM and lids are normal. Pupils are equal, round, and reactive to light.   Neck: Trachea normal and phonation normal. Neck supple.   Cardiovascular: Normal rate.   Pulmonary/Chest: Effort normal. No stridor. No respiratory distress.   Musculoskeletal: Normal range of motion.         General: Swelling and tenderness present. Normal range of motion.      Comments: Left foot: there is moderate swelling and TTP over the 1st MTP joint. There is overlying warmth and erythema in this location as well. NV intact.    Neurological: He is alert and oriented to person, place, and time.   Skin: Skin is warm, dry, intact and no rash. Capillary refill takes less than 2 seconds. erythema No abrasion, No burn, No bruising and No ecchymosis         Comments: Erythema, warmth and swelling noted to the left 1st MTP joint.      Psychiatric: His speech is normal and behavior is normal. Judgment and thought content normal.   Nursing note and vitals reviewed.      Assessment:     1. Podagra    2. Foot injury, left, initial encounter    3. Hypertension, unspecified type    4. Mixed hyperlipidemia    5. Attention deficit hyperactivity disorder (ADHD), unspecified ADHD type      X-Ray Foot Complete 3 view Left  Result Date: 12/3/2024  EXAMINATION: XR FOOT COMPLETE 3 VIEW LEFT CLINICAL HISTORY: .  Unspecified injury of left foot, initial encounter TECHNIQUE: AP, lateral and oblique views of the left foot were performed.  COMPARISON: None FINDINGS: There is some degenerative change at the 1st MP joint and a small bony fragment is seen adjacent to the proximal and of the proximal phalanx I am not sure this is developmental or small avulsion.  Soft tissue swelling is seen in this area.  The remainder of the foot is intact with a plantar calcaneal spur present.   See above Electronically signed by: Rohan Niño MD Date: 12/03/2024 Time: 09:42     Plan:       Podagra  -     URIC ACID  -     methylPREDNISolone (MEDROL DOSEPACK) 4 mg tablet; use as directed  Dispense: 21 each; Refill: 0    Foot injury, left, initial encounter  -     X-Ray Foot Complete 3 view Left; Future; Expected date: 12/03/2024  -     Walking Boot For Home Use    Hypertension, unspecified type    Mixed hyperlipidemia    Attention deficit hyperactivity disorder (ADHD), unspecified ADHD type          Medical Decision Making:   History:   Old Medical Records: I decided to obtain old medical records.  Old Records Summarized: records from clinic visits.  Independently Interpreted Test(s):   I have ordered and independently interpreted X-rays - see summary below.  Clinical Tests:   Radiological Study: Ordered and Reviewed  Urgent Care Management:  A. Problem List:   -Acute: Podagra of left foot, left foot injury    -Chronic: ADHD, HTN, HLD, GERD   B. Differential diagnosis: fracture, dislocation, soft tissue injury, gout   C. Diagnostic Testing Ordered: Plain films, uric acid   D. Diagnostic Testing Considered: None  E. Independent Historians: None  F. Urgent Care Midlevel Independent Results Interpretation: Plain films reviewed and interpreted by me- no definitive fracture noted   G. Radiology: See radiology report above   H. Review of Previous Medical Records:  I. Home Medications Reviewed  J. Social Determinants of Health considered  K. Medical Decision Making and Disposition:   Patient presents to the clinic with complaints of pain, swelling and erythema to the left  1st MTP joint after a possible injury over a week ago.  On exam, he is afebrile and nontoxic in appearance.  His exam findings are most consistent with podagra secondary to gout however, he does report a possible injury on his 4 smith throttle recently and the radiologist does feel that there may be a slight avulsion fracture in this area as well.  He was given a Medrol Dosepak for inflammation in addition to put in a walking boot and obtaining a uric acid level at this time.  Should his uric acid level be normal, I advised patient to follow-up with orthopedics as these symptoms are likely related to a small avulsion fracture.  ED precautions were discussed.  The patient verbalized understanding and agreed with this plan.         Patient Instructions   Thank you for choosing [facility]!     Our goal in the Urgent Care is to always provide outstanding medical care. You may receive a survey by mail or e-mail in the next week regarding your experience today. We would greatly appreciate you completing and returning the survey. Your feedback provides us with a way to recognize our staff who provide very good care, and it helps us learn how to improve when your experience was below our aspiration of excellence.       We appreciate you trusting us with your medical care. We hope you feel better soon. We will be happy to take care of you for all of your future medical needs.    You must understand that you've received an Urgent Care treatment only and that you may be released before all your medical problems are known or treated. You, the patient, will arrange for follow up care as instructed.      Follow up with your PCP or specialty clinic as instructed in the next 2-3 days if not improved or as needed. You can call [phone] to schedule an appointment with appropriate provider.      If you condition worsens, we recommend that you receive another evaluation at the emergency room immediately or contact your  primary medical clinic's after hours call service to discuss your concerns.      Please return here or go to the Emergency Department for any concerns or worsening condition.      You have been prescribed a steroid today. Take the prescription as directed. Steroids can increase blood sugar. You can also have the following when taking steroids: flushing, jitteriness, weight gain, fluid retention, bone weakening. If you develop any adverse symptoms, stop taking the medication immediately.

## 2024-12-03 NOTE — PATIENT INSTRUCTIONS
Thank you for choosing [facility]!     Our goal in the Urgent Care is to always provide outstanding medical care. You may receive a survey by mail or e-mail in the next week regarding your experience today. We would greatly appreciate you completing and returning the survey. Your feedback provides us with a way to recognize our staff who provide very good care, and it helps us learn how to improve when your experience was below our aspiration of excellence.       We appreciate you trusting us with your medical care. We hope you feel better soon. We will be happy to take care of you for all of your future medical needs.    You must understand that you've received an Urgent Care treatment only and that you may be released before all your medical problems are known or treated. You, the patient, will arrange for follow up care as instructed.      Follow up with your PCP or specialty clinic as instructed in the next 2-3 days if not improved or as needed. You can call [phone] to schedule an appointment with appropriate provider.      If you condition worsens, we recommend that you receive another evaluation at the emergency room immediately or contact your primary medical clinic's after hours call service to discuss your concerns.      Please return here or go to the Emergency Department for any concerns or worsening condition.      You have been prescribed a steroid today. Take the prescription as directed. Steroids can increase blood sugar. You can also have the following when taking steroids: flushing, jitteriness, weight gain, fluid retention, bone weakening. If you develop any adverse symptoms, stop taking the medication immediately.

## 2024-12-04 ENCOUNTER — PATIENT MESSAGE (OUTPATIENT)
Dept: URGENT CARE | Facility: CLINIC | Age: 59
End: 2024-12-04
Payer: COMMERCIAL

## 2024-12-04 LAB — URATE SERPL-MCNC: 9 MG/DL (ref 3.4–7)

## 2025-01-16 ENCOUNTER — OFFICE VISIT (OUTPATIENT)
Dept: FAMILY MEDICINE | Facility: CLINIC | Age: 60
End: 2025-01-16
Payer: COMMERCIAL

## 2025-01-16 VITALS
DIASTOLIC BLOOD PRESSURE: 64 MMHG | WEIGHT: 260.81 LBS | BODY MASS INDEX: 38.63 KG/M2 | HEIGHT: 69 IN | OXYGEN SATURATION: 96 % | TEMPERATURE: 99 F | SYSTOLIC BLOOD PRESSURE: 122 MMHG | HEART RATE: 90 BPM

## 2025-01-16 DIAGNOSIS — E87.6 HYPOKALEMIA: ICD-10-CM

## 2025-01-16 DIAGNOSIS — I10 PRIMARY HYPERTENSION: Primary | ICD-10-CM

## 2025-01-16 DIAGNOSIS — F90.0 ADHD (ATTENTION DEFICIT HYPERACTIVITY DISORDER), INATTENTIVE TYPE: ICD-10-CM

## 2025-01-16 PROCEDURE — 99999 PR PBB SHADOW E&M-EST. PATIENT-LVL IV: CPT | Mod: PBBFAC,,, | Performed by: NURSE PRACTITIONER

## 2025-01-16 PROCEDURE — 1159F MED LIST DOCD IN RCRD: CPT | Mod: CPTII,S$GLB,, | Performed by: NURSE PRACTITIONER

## 2025-01-16 PROCEDURE — 3074F SYST BP LT 130 MM HG: CPT | Mod: CPTII,S$GLB,, | Performed by: NURSE PRACTITIONER

## 2025-01-16 PROCEDURE — 99214 OFFICE O/P EST MOD 30 MIN: CPT | Mod: S$GLB,,, | Performed by: NURSE PRACTITIONER

## 2025-01-16 PROCEDURE — 1160F RVW MEDS BY RX/DR IN RCRD: CPT | Mod: CPTII,S$GLB,, | Performed by: NURSE PRACTITIONER

## 2025-01-16 PROCEDURE — 3078F DIAST BP <80 MM HG: CPT | Mod: CPTII,S$GLB,, | Performed by: NURSE PRACTITIONER

## 2025-01-16 PROCEDURE — 3008F BODY MASS INDEX DOCD: CPT | Mod: CPTII,S$GLB,, | Performed by: NURSE PRACTITIONER

## 2025-01-16 RX ORDER — POTASSIUM CHLORIDE 20 MEQ/1
20 TABLET, EXTENDED RELEASE ORAL 2 TIMES DAILY
Qty: 180 TABLET | Refills: 3 | Status: SHIPPED | OUTPATIENT
Start: 2025-01-16

## 2025-01-16 RX ORDER — DEXTROAMPHETAMINE SULFATE, DEXTROAMPHETAMINE SACCHARATE, AMPHETAMINE SULFATE AND AMPHETAMINE ASPARTATE 7.5; 7.5; 7.5; 7.5 MG/1; MG/1; MG/1; MG/1
60 CAPSULE, EXTENDED RELEASE ORAL EVERY MORNING
Qty: 180 CAPSULE | Refills: 0 | Status: SHIPPED | OUTPATIENT
Start: 2025-01-16

## 2025-01-16 RX ORDER — HYDROCHLOROTHIAZIDE 25 MG/1
25 TABLET ORAL DAILY
Qty: 90 TABLET | Refills: 3 | Status: SHIPPED | OUTPATIENT
Start: 2025-01-16 | End: 2026-01-16

## 2025-01-16 NOTE — PROGRESS NOTES
"Subjective:       Patient ID: Nick Mcdonough Sr. is a 59 y.o. male.    Chief Complaint: Follow-up (Pt here for a 2 month follow up )        HPI WITH ASSESSMENT AND PLAN OF CARE:      Patient is a 59-year-old white male with Aortic Atherosclerosis, Hypertension, Hyperlipidemia, chronic GERD, personal history of colon polyps, ADHD, tobacco user, fluid retention to lower extremities with venous stasis dermatitis, and obesity that is here today for follow up with fasting lab results.        Aortic Atherosclerosis  Seen on CT lung screening 4/2024  Taking ASA 81 mg daily  Already on Statin therapy with LDL 58  Stable.        Hypertension  Currently taking Lisinopril 40 mg daily, HCT 25 mg daily, Doxazosin 8 mg daily and Amlodipine 10 mg daily.   STOPPED the MAXZIDE 25 mg daily and change to HCTZ 25 mg daily on 10/2/24 due to elevated sodium levels.  Sodium level is improved but potassium level is low  Start K-Dur 20 meq daily supplement 11/21/24  Potassium still low - INCREASE K-Dur to 40 meq daily   Recheck in 3 months.  /64   Pulse 90   Temp 99 °F (37.2 °C) (Temporal)   Ht 5' 9" (1.753 m)   Wt 118.3 kg (260 lb 12.9 oz)   SpO2 96%   BMI 38.51 kg/m²                Elevated Serum Sodium levels  Sodium 147 in October 2024  STOPPED the MAXZIDE 25 mg daily and changed to HCTZ 25 mg daily  Decreased sodium in diet  11/21/24 - sodium level improved.  1/14/2025 - remains stable.             LOW POTASSIUM  STOPPED the MAXZIDE 25 mg daily and change to HCTZ 25 mg daily on 10/2/24 due to elevated sodium levels.  Potassium level ilow  11/21/24: start K-Dur 20 meq daily supplement  1/14/2025:  Increase K-Dur 40 meq daily supplement  Recheck in 3 months.          Hyperlipidemia  Intolerance to Rosuvastatin due to acute itching  On Pravastatin 2019 - June 2020 - triglycerides too high  Change to Atorvastatin June 2020 and levels in October 2020 much improved.  Zetia with LDL uncontrolled with elevated liver enzymes " 2022/2023  CHANGE BACK TO ATORVASTATIN 10 mg daily in October 2023  LDL 58  Stable  Recheck in October 2025             Obese  Body mass index is 38.51 kg/m².  Working on lifestyle modifications            ADHD   stable on current regimen Adderall XR 30 mg 2 capsules daily.    Patient is able to focus and complete tasks effectively.  No side effects reported.   Patient has intolerance to GENERIC Adderall XR in the past due to ineffectiveness and crash effect but has been taking the BRAND Adderall XR 30 mg 2 capsule daily since prior to 2017 without issue.  Stable.  Recheck in 3 months.        Chronic GERD  Patient reports on PPI for multiple years  Reports Nexium 40 mg no longer covered - has to change to Omeprazole  Taking Omeprazole 40 mg daily  saw GI for EGD 8/21/23  EGD 11/29/23:  Mild Erythematous Gastropathy  Continue PPI  Stable.        Personal History of Colon Polyps  Last Colonoscopy 11/29/2023 - Dr. Alvarez  + polyps - repeat in 3 years - November 2026        Current smoker   for over 30 years - 1ppd smoker.    States he started smoking around age 23.    He declines the smoking cessation program.    CT lung screen 4/22/2024:  FINDINGS: Exam is negative. No significant pulmonary nodule identified.   Repeat April 2025        IFG   with HgbA1C 5.6% in September 2023  No history of elevations  FBG 98 and A1C 5.3%   Stable.             History of Elevated liver enzymes  AST 81 and  in September 2023 = no history of elevations since 2018 and before.  No recent changes in medication  No recent illness reported  Levels improved but still elevated in January 2024  Plan to repeat hepatic function, hepatitis panel and liver ultrasound in 3 months   LIVER ENZYMES are now NORMAL in April 2024  Hepatitis panel is negative  Liver ultrasound was NOT done but since labs are okay - we can hold off on imaging.  Repeat labs in October 2024 STABLE  Can now monitor yearly          Lab Visit on 01/14/2025  "  Component Date Value Ref Range Status    Sodium 01/14/2025 143  136 - 145 mmol/L Final    Potassium 01/14/2025 3.1 (L)  3.5 - 5.1 mmol/L Final    Chloride 01/14/2025 104  95 - 110 mmol/L Final    CO2 01/14/2025 27  23 - 29 mmol/L Final    Glucose 01/14/2025 102  70 - 110 mg/dL Final    BUN 01/14/2025 10  6 - 20 mg/dL Final    Creatinine 01/14/2025 0.8  0.5 - 1.4 mg/dL Final    Calcium 01/14/2025 8.9  8.7 - 10.5 mg/dL Final    Anion Gap 01/14/2025 12  8 - 16 mmol/L Final    eGFR 01/14/2025 >60.0  >60 mL/min/1.73 m^2 Final         Vitals:    01/16/25 1552   BP: 122/64   Pulse: 90   Temp: 99 °F (37.2 °C)   TempSrc: Temporal   SpO2: 96%   Weight: 118.3 kg (260 lb 12.9 oz)   Height: 5' 9" (1.753 m)         Diagnoses this Encounter:         ICD-10-CM ICD-9-CM   1. Primary hypertension  I10 401.9   2. ADHD (attention deficit hyperactivity disorder), inattentive type  F90.0 314.00   3. Hypokalemia  E87.6 276.8       Orders Placed This Encounter    BASIC METABOLIC PANEL    potassium chloride SA (K-DUR,KLOR-CON) 20 MEQ tablet    ADDERALL XR 30 mg 24 hr capsule    hydroCHLOROthiazide (HYDRODIURIL) 25 MG tablet        Follow up in about 3 months (around 4/16/2025) for repeat BMP and follow up.     Patient's Medications   New Prescriptions    No medications on file   Previous Medications    AMLODIPINE (NORVASC) 10 MG TABLET    Take 1 tablet (10 mg total) by mouth once daily.    ATORVASTATIN (LIPITOR) 10 MG TABLET    Take 1 tablet (10 mg total) by mouth once daily.    DOXAZOSIN (CARDURA) 8 MG TAB    Take 1 tablet (8 mg total) by mouth once daily.    FISH OIL-OMEGA-3 FATTY ACIDS 300-1,000 MG CAPSULE    Take 1 g by mouth once daily.    LISINOPRIL (PRINIVIL,ZESTRIL) 40 MG TABLET    Take 1 tablet (40 mg total) by mouth once daily.    NIACIN 500 MG TAB    Take 1 tablet (500 mg total) by mouth every evening.    OMEPRAZOLE (PRILOSEC) 40 MG CAPSULE    Take 1 capsule (40 mg total) by mouth once daily.   Modified Medications    Modified " Medication Previous Medication    ADDERALL XR 30 MG 24 HR CAPSULE ADDERALL XR 30 mg 24 hr capsule       Take 2 capsules (60 mg total) by mouth every morning. BRAND ONLY    Take 2 capsules (60 mg total) by mouth every morning. BRAND ONLY    HYDROCHLOROTHIAZIDE (HYDRODIURIL) 25 MG TABLET hydroCHLOROthiazide (HYDRODIURIL) 25 MG tablet       Take 1 tablet (25 mg total) by mouth once daily.    Take 1 tablet (25 mg total) by mouth once daily.    POTASSIUM CHLORIDE SA (K-DUR,KLOR-CON) 20 MEQ TABLET potassium chloride SA (K-DUR,KLOR-CON) 20 MEQ tablet       Take 1 tablet (20 mEq total) by mouth 2 (two) times daily.    Take 1 tablet (20 mEq total) by mouth once daily.   Discontinued Medications    METHYLPREDNISOLONE (MEDROL DOSEPACK) 4 MG TABLET    use as directed         Review of Systems   HENT: Negative.     Respiratory: Negative.     Cardiovascular: Negative.    Gastrointestinal: Negative.          Objective:        Physical Exam  Constitutional:       General: He is not in acute distress.     Appearance: Normal appearance. He is obese. He is not toxic-appearing or diaphoretic.      Comments: Body mass index is 38.51 kg/m².               HENT:      Head: Normocephalic and atraumatic.   Eyes:      Extraocular Movements: Extraocular movements intact.      Conjunctiva/sclera: Conjunctivae normal.   Cardiovascular:      Rate and Rhythm: Normal rate and regular rhythm.      Heart sounds: Normal heart sounds.   Pulmonary:      Effort: Pulmonary effort is normal. No respiratory distress.      Breath sounds: Normal breath sounds.   Abdominal:      General: There is no distension.   Skin:     General: Skin is warm and dry.   Neurological:      Mental Status: He is alert and oriented to person, place, and time.   Psychiatric:         Mood and Affect: Mood normal.         Behavior: Behavior normal.         Thought Content: Thought content normal.             Past Medical History:   Diagnosis Date    Adult ADHD     Alcohol  dependency 2015    last alcohol intake 2015    Aortic atherosclerosis 2024    Seen on CT lung screening 2024  Advised to start ASA 81 mg daily  Already on Statin therapy and stable.      Colon polyp 2018    2 polyps - tubular adenoma - repeat colonoscopy in 5 years    GERD (gastroesophageal reflux disease)     Hyperlipidemia     Hypertension        Past Surgical History:   Procedure Laterality Date    COLONOSCOPY N/A 2018    Procedure: COLONOSCOPY;  Surgeon: Eduard Medley MD;  Location: [facility] ENDO;  Service: Endoscopy;  Laterality: N/A;    COLONOSCOPY N/A 2023    Procedure: COLONOSCOPY;  Surgeon: Jace Prince MD;  Location: [facility] (2ND FLR);  Service: Endoscopy;  Laterality: N/A;  Dr. Gela hale, pt requesting this date, ext/constipation prepMiralax instru via portal-  2nd flr-airway, >BMI possible sleep apnea per Dr. Ren  -precall complete-MS    ESOPHAGOGASTRODUODENOSCOPY N/A 2023    Procedure: EGD (ESOPHAGOGASTRODUODENOSCOPY);  Surgeon: Jace Prince MD;  [facility]  Service: Endoscopy;  Laterality: N/A;    HERNIA REPAIR      INGUINAL HERNIA REPAIR Left     done at [facility]    KNEE SURGERY Right     VASECTOMY         Family History   Problem Relation Name Age of Onset    Heart disease Mother Gwendolyn         AFIB    Arthritis Mother Gwendolyn     Depression Mother Gwendolyn     Drug abuse Mother Gwendolyn     Hyperlipidemia Mother Gwendolyn     Hypertension Mother Gwendolyn     Mental illness Mother Gwendolyn     Hypertension Father Jose     Heart disease Brother Luiz         acute MI -  at at 46    Alcohol abuse Brother Luiz     Early death Brother Luiz     Mental illness Brother Luiz     Cancer Maternal Grandmother Danielle     No Known Problems Maternal Grandfather      Heart disease Paternal Grandmother Danielle     Alcohol abuse Paternal Grandfather Jose        Social History     Socioeconomic History    Marital status:     Occupational History    Occupation:    Tobacco Use    Smoking status: Every Day     Current packs/day: 0.50     Average packs/day: 1 pack/day for 36.3 years (35.9 ttl pk-yrs)     Types: Cigarettes     Start date: 10/12/1988    Smokeless tobacco: Never   Substance and Sexual Activity    Alcohol use: Yes     Alcohol/week: 8.0 standard drinks of alcohol     Types: 8 Cans of beer per week     Comment: every weekend - a couple beers per weekend night    Drug use: No    Sexual activity: Yes     Partners: Female     Social Drivers of Health     Financial Resource Strain: Low Risk  (7/2/2024)    Overall Financial Resource Strain (CARDIA)     Difficulty of Paying Living Expenses: Not hard at all   Food Insecurity: No Food Insecurity (7/2/2024)    Hunger Vital Sign     Worried About Running Out of Food in the Last Year: Never true     Ran Out of Food in the Last Year: Never true   Transportation Needs: No Transportation Needs (8/21/2023)    PRAPARE - Transportation     Lack of Transportation (Medical): No     Lack of Transportation (Non-Medical): No   Physical Activity: Insufficiently Active (7/2/2024)    Exercise Vital Sign     Days of Exercise per Week: 3 days     Minutes of Exercise per Session: 30 min   Stress: No Stress Concern Present (7/2/2024)    [employer] - Occupational Stress Questionnaire     Feeling of Stress : Not at all   Housing Stability: Low Risk  (8/21/2023)    Housing Stability Vital Sign     Unable to Pay for Housing in the Last Year: No     Number of Places Lived in the Last Year: 1     Unstable Housing in the Last Year: No

## 2025-03-06 DIAGNOSIS — E78.5 HYPERLIPIDEMIA, UNSPECIFIED HYPERLIPIDEMIA TYPE: ICD-10-CM

## 2025-03-06 RX ORDER — GLUCOSAMINE/CHONDR SU A SOD 167-133 MG
500 CAPSULE ORAL NIGHTLY
Qty: 100 TABLET | Refills: 3 | Status: SHIPPED | OUTPATIENT
Start: 2025-03-06 | End: 2026-03-06

## 2025-04-14 ENCOUNTER — OFFICE VISIT (OUTPATIENT)
Dept: FAMILY MEDICINE | Facility: CLINIC | Age: 60
End: 2025-04-14
Payer: COMMERCIAL

## 2025-04-14 VITALS
OXYGEN SATURATION: 97 % | HEART RATE: 98 BPM | DIASTOLIC BLOOD PRESSURE: 70 MMHG | BODY MASS INDEX: 39.12 KG/M2 | TEMPERATURE: 99 F | SYSTOLIC BLOOD PRESSURE: 122 MMHG | WEIGHT: 264.13 LBS | HEIGHT: 69 IN

## 2025-04-14 DIAGNOSIS — F90.0 ADHD (ATTENTION DEFICIT HYPERACTIVITY DISORDER), INATTENTIVE TYPE: ICD-10-CM

## 2025-04-14 DIAGNOSIS — Z12.2 SCREENING FOR LUNG CANCER: ICD-10-CM

## 2025-04-14 DIAGNOSIS — I70.0 AORTIC ATHEROSCLEROSIS: ICD-10-CM

## 2025-04-14 DIAGNOSIS — E66.01 CLASS 2 SEVERE OBESITY DUE TO EXCESS CALORIES WITH SERIOUS COMORBIDITY AND BODY MASS INDEX (BMI) OF 39.0 TO 39.9 IN ADULT: ICD-10-CM

## 2025-04-14 DIAGNOSIS — E87.6 HYPOKALEMIA: ICD-10-CM

## 2025-04-14 DIAGNOSIS — E66.812 CLASS 2 SEVERE OBESITY DUE TO EXCESS CALORIES WITH SERIOUS COMORBIDITY AND BODY MASS INDEX (BMI) OF 39.0 TO 39.9 IN ADULT: ICD-10-CM

## 2025-04-14 DIAGNOSIS — E79.0 ELEVATED URIC ACID IN BLOOD: ICD-10-CM

## 2025-04-14 DIAGNOSIS — M10.9 ACUTE GOUT OF RIGHT FOOT, UNSPECIFIED CAUSE: ICD-10-CM

## 2025-04-14 DIAGNOSIS — I10 PRIMARY HYPERTENSION: Primary | ICD-10-CM

## 2025-04-14 DIAGNOSIS — F17.210 CIGARETTE NICOTINE DEPENDENCE WITHOUT COMPLICATION: ICD-10-CM

## 2025-04-14 PROCEDURE — 4010F ACE/ARB THERAPY RXD/TAKEN: CPT | Mod: CPTII,S$GLB,, | Performed by: NURSE PRACTITIONER

## 2025-04-14 PROCEDURE — 99214 OFFICE O/P EST MOD 30 MIN: CPT | Mod: S$GLB,,, | Performed by: NURSE PRACTITIONER

## 2025-04-14 PROCEDURE — 3074F SYST BP LT 130 MM HG: CPT | Mod: CPTII,S$GLB,, | Performed by: NURSE PRACTITIONER

## 2025-04-14 PROCEDURE — 1160F RVW MEDS BY RX/DR IN RCRD: CPT | Mod: CPTII,S$GLB,, | Performed by: NURSE PRACTITIONER

## 2025-04-14 PROCEDURE — 1159F MED LIST DOCD IN RCRD: CPT | Mod: CPTII,S$GLB,, | Performed by: NURSE PRACTITIONER

## 2025-04-14 PROCEDURE — G0296 VISIT TO DETERM LDCT ELIG: HCPCS | Mod: S$GLB,,, | Performed by: NURSE PRACTITIONER

## 2025-04-14 PROCEDURE — 3078F DIAST BP <80 MM HG: CPT | Mod: CPTII,S$GLB,, | Performed by: NURSE PRACTITIONER

## 2025-04-14 PROCEDURE — 99999 PR PBB SHADOW E&M-EST. PATIENT-LVL V: CPT | Mod: PBBFAC,,, | Performed by: NURSE PRACTITIONER

## 2025-04-14 PROCEDURE — G2211 COMPLEX E/M VISIT ADD ON: HCPCS | Mod: S$GLB,,, | Performed by: NURSE PRACTITIONER

## 2025-04-14 PROCEDURE — 3008F BODY MASS INDEX DOCD: CPT | Mod: CPTII,S$GLB,, | Performed by: NURSE PRACTITIONER

## 2025-04-14 RX ORDER — FUROSEMIDE 20 MG/1
20 TABLET ORAL DAILY
Qty: 90 TABLET | Refills: 0 | Status: SHIPPED | OUTPATIENT
Start: 2025-04-14 | End: 2026-04-14

## 2025-04-14 RX ORDER — METHYLPREDNISOLONE 4 MG/1
TABLET ORAL
Qty: 21 EACH | Refills: 0 | Status: SHIPPED | OUTPATIENT
Start: 2025-04-14 | End: 2025-05-05

## 2025-04-14 RX ORDER — COLCHICINE 0.6 MG/1
TABLET ORAL
Qty: 30 TABLET | Refills: 4 | Status: SHIPPED | OUTPATIENT
Start: 2025-04-14

## 2025-04-14 RX ORDER — DEXTROAMPHETAMINE SULFATE, DEXTROAMPHETAMINE SACCHARATE, AMPHETAMINE SULFATE AND AMPHETAMINE ASPARTATE 7.5; 7.5; 7.5; 7.5 MG/1; MG/1; MG/1; MG/1
60 CAPSULE, EXTENDED RELEASE ORAL EVERY MORNING
Qty: 180 CAPSULE | Refills: 0 | Status: SHIPPED | OUTPATIENT
Start: 2025-04-14

## 2025-04-14 NOTE — PROGRESS NOTES
Subjective:       Patient ID: Nick Mcdonough Sr. is a 59 y.o. male.    Chief Complaint: Follow-up (3 months F/U) and Gout (In right foot)        HPI WITH ASSESSMENT AND PLAN OF CARE:      Patient is a 59-year-old white male with Aortic Atherosclerosis, Hypertension, Hyperlipidemia, chronic GERD, personal history of colon polyps, ADHD, tobacco user, fluid retention to lower extremities with venous stasis dermatitis, elevated uric acid with personal history of gout and obesity that is here today for follow up with fasting lab results.        Aortic Atherosclerosis  Seen on CT lung screening 4/2024  Taking ASA 81 mg daily  Already on Statin therapy with LDL 58  Stable.  Recheck levels in October           Hypertension  Currently taking Lisinopril 40 mg daily, HCT 25 mg daily, Doxazosin 8 mg daily and Amlodipine 10 mg daily.   STOPPED the MAXZIDE 25 mg daily and change to HCTZ 25 mg daily on 10/2/24 due to elevated sodium levels.  Sodium level is improved but potassium level remained low low  Start K-Dur 20 meq daily supplement 11/21/24  INCREASED K-Dur to 40 meq daily 1/16/2025  Patient has since had 2 episodes of gout with elevation uric acid levels.  STOP the HCTZ 25 mg daily 4/14/25 and CHANGE to LASIX 20 mg daily  Continue the K-dur 20 meq BID  Recheck in 3 months.                History of Elevated Serum Sodium levels  Sodium 147 in October 2024  STOPPED the MAXZIDE 25 mg daily and changed to HCTZ 25 mg daily  Decreased sodium in diet  11/21/24 - sodium level improved.  1/14/2025 - remains stable.             LOW POTASSIUM  STOPPED the MAXZIDE 25 mg daily and change to HCTZ 25 mg daily on 10/2/24 due to elevated sodium levels.  Potassium level ilow  11/21/24: start K-Dur 20 meq daily supplement  1/14/2025:  Increase K-Dur 40 meq daily supplement  Recheck in 3 months.            Hyperlipidemia  Intolerance to Rosuvastatin due to acute itching  On Pravastatin 2019 - June 2020 - triglycerides too high  Change to  Atorvastatin June 2020 and levels in October 2020 much improved.  Zetia with LDL uncontrolled with elevated liver enzymes 2022/2023  CHANGE BACK TO ATORVASTATIN 10 mg daily in October 2023  LDL 58  Stable  Recheck in October 2025             OBESITY  Body mass index is 39 kg/m².  Working on lifestyle modifications            ADHD   stable on current regimen Adderall XR 30 mg 2 capsules daily.    Patient is able to focus and complete tasks effectively.  No side effects reported.   Patient has intolerance to GENERIC Adderall XR in the past due to ineffectiveness and crash effect but has been taking the BRAND Adderall XR 30 mg 2 capsule daily since prior to 2017 without issue.  Stable.  Recheck in 3 months.        Chronic GERD  Patient reports on PPI for multiple years  Reports Nexium 40 mg no longer covered - has to change to Omeprazole  Taking Omeprazole 40 mg daily  saw GI for EGD 8/21/23  EGD 11/29/23:  Mild Erythematous Gastropathy  Continue PPI  Stable.        Personal History of Colon Polyps  Last Colonoscopy 11/29/2023 - Dr. Alvarez  + polyps - repeat in 3 years - November 2026        Current smoker   for over 30 years - 1ppd smoker.    States he started smoking around age 23.    He declines the smoking cessation program.    CT lung screen 4/22/2024:  FINDINGS: Exam is negative. No significant pulmonary nodule identified.   Repeat April 2025        IFG   with HgbA1C 5.6% in September 2023  No history of elevations  FBG 98 and A1C 5.3%   Stable.  Recheck levels in October              History of Elevated liver enzymes  AST 81 and  in September 2023 = no history of elevations since 2018 and before.  No recent changes in medication  No recent illness reported  Levels improved but still elevated in January 2024  Plan to repeat hepatic function, hepatitis panel and liver ultrasound in 3 months   LIVER ENZYMES are now NORMAL in April 2024  Hepatitis panel is negative  Liver ultrasound was NOT done but  "since labs are okay - we can hold off on imaging.  Repeat labs in October 2024 STABLE  Can now monitor yearly - recheck in October.            ACUTE GOUT FLARE with History of Gout and ELEVATED Uric Acid Levels  1st diagnosed with gout at Urgent Care on 12/3/2024 - treated with medrol dose pack  Uric Acid level was done - high at 9  Reports started yesterday with pain and swelling to top of right foot - see picture belwo  + swelling to right foot and toes present  Good pedal pulses, cap refill < 2 sec, skin temp normal  Will treat with MEDROL DOSE PACK.  Prescribed colchicine for future gout flare-ups  Will get uric acid level in 3 months while NOT in gout flare to get baseline level.      Lab Visit on 04/12/2025   Component Date Value Ref Range Status    Sodium 04/12/2025 145  136 - 145 mmol/L Final    Potassium 04/12/2025 3.3 (L)  3.5 - 5.1 mmol/L Final    Chloride 04/12/2025 108  95 - 110 mmol/L Final    CO2 04/12/2025 28  23 - 29 mmol/L Final    Glucose 04/12/2025 96  70 - 110 mg/dL Final    BUN 04/12/2025 15  6 - 20 mg/dL Final    Creatinine 04/12/2025 0.8  0.5 - 1.4 mg/dL Final    Calcium 04/12/2025 9.1  8.7 - 10.5 mg/dL Final    Anion Gap 04/12/2025 9  8 - 16 mmol/L Final    eGFR 04/12/2025 >60  >60 mL/min/1.73/m2 Final    Estimated GFR calculated using the CKD-EPI creatinine (2021) equation.         Vitals:    04/14/25 1515   BP: 122/70   BP Location: Right arm   Patient Position: Sitting   Pulse: 98   Temp: 99 °F (37.2 °C)   TempSrc: Temporal   SpO2: 97%   Weight: 119.8 kg (264 lb 1.8 oz)   Height: 5' 9" (1.753 m)         Diagnoses this Encounter:         ICD-10-CM ICD-9-CM   1. Cigarette nicotine dependence without complication  F17.210 305.1   2. Screening for lung cancer  Z12.2 V76.0   3. Acute gout of right foot, unspecified cause  M10.9 274.01   4. Elevated uric acid in blood  E79.0 790.6       Orders Placed This Encounter    CT Chest Lung Screening Low Dose    Uric Acid    colchicine (COLCRYS) 0.6 mg " tablet    methylPREDNISolone (MEDROL DOSEPACK) 4 mg tablet        No follow-ups on file.     Patient's Medications   New Prescriptions    COLCHICINE (COLCRYS) 0.6 MG TABLET    Take 2 tablets at onset of gout flare-up and the 1 tablet 1 hour later - max dose 3 tablets/treatment course.  May repeat this in 72 hours if needed.    METHYLPREDNISOLONE (MEDROL DOSEPACK) 4 MG TABLET    use as directed   Previous Medications    ADDERALL XR 30 MG 24 HR CAPSULE    Take 2 capsules (60 mg total) by mouth every morning. BRAND ONLY    AMLODIPINE (NORVASC) 10 MG TABLET    Take 1 tablet (10 mg total) by mouth once daily.    ATORVASTATIN (LIPITOR) 10 MG TABLET    Take 1 tablet (10 mg total) by mouth once daily.    DOXAZOSIN (CARDURA) 8 MG TAB    Take 1 tablet (8 mg total) by mouth once daily.    FISH OIL-OMEGA-3 FATTY ACIDS 300-1,000 MG CAPSULE    Take 1 g by mouth once daily.    HYDROCHLOROTHIAZIDE (HYDRODIURIL) 25 MG TABLET    Take 1 tablet (25 mg total) by mouth once daily.    LISINOPRIL (PRINIVIL,ZESTRIL) 40 MG TABLET    Take 1 tablet (40 mg total) by mouth once daily.    NIACIN 500 MG TAB    Take 1 tablet (500 mg total) by mouth every evening.    OMEPRAZOLE (PRILOSEC) 40 MG CAPSULE    Take 1 capsule (40 mg total) by mouth once daily.    POTASSIUM CHLORIDE SA (K-DUR,KLOR-CON) 20 MEQ TABLET    Take 1 tablet (20 mEq total) by mouth 2 (two) times daily.   Modified Medications    No medications on file   Discontinued Medications    No medications on file         Review of Systems      Objective:        Physical Exam        Past Medical History:   Diagnosis Date    Adult ADHD     Alcohol dependency 09/2015    last alcohol intake September 2015    Aortic atherosclerosis 05/02/2024    Seen on CT lung screening 4/2024  Advised to start ASA 81 mg daily  Already on Statin therapy and stable.      Colon polyp 02/02/2018    2 polyps - tubular adenoma - repeat colonoscopy in 5 years    GERD (gastroesophageal reflux disease)     Hyperlipidemia      Hypertension        Past Surgical History:   Procedure Laterality Date    COLONOSCOPY N/A 2018    Procedure: COLONOSCOPY;  Surgeon: Eduard Medley MD;  Location: [facility] ENDO;  Service: Endoscopy;  Laterality: N/A;    COLONOSCOPY N/A 2023    Procedure: COLONOSCOPY;  Surgeon: Jace Prince MD;  Location: [facility] (2ND FLR);  Service: Endoscopy;  Laterality: N/A;  Dr. Ren refer, pt requesting this date, ext/constipation prepMiralax instru via portal-KP  2nd flr-airway, >BMI possible sleep apnea per Dr. Ren  -precall complete-MS    ESOPHAGOGASTRODUODENOSCOPY N/A 2023    Procedure: EGD (ESOPHAGOGASTRODUODENOSCOPY);  Surgeon: Jace Prince MD;  Location: [facility] (2ND FLR);  Service: Endoscopy;  Laterality: N/A;    HERNIA REPAIR      INGUINAL HERNIA REPAIR Left     done at [facility]    KNEE SURGERY Right     VASECTOMY         Family History   Problem Relation Name Age of Onset    Heart disease Mother Gwendolyn         AFIB    Arthritis Mother Gwendolyn     Depression Mother Gwendolyn     Drug abuse Mother Gwendolyn     Hyperlipidemia Mother Gwendolyn     Hypertension Mother Gwendolyn     Mental illness Mother Gwendolyn     Hypertension Father Jose     Heart disease Brother Luiz         acute MI -  at at 46    Alcohol abuse Brother Luzi     Early death Brother Luiz     Mental illness Brother Luiz     Cancer Maternal Grandmother Danielle     No Known Problems Maternal Grandfather      Heart disease Paternal Grandmother Danielle     Alcohol abuse Paternal Grandfather Jose        Social History     Socioeconomic History    Marital status:    Occupational History    Occupation:    Tobacco Use    Smoking status: Every Day     Current packs/day: 0.50     Average packs/day: 1 pack/day for 36.5 years (36.0 ttl pk-yrs)     Types: Cigarettes     Start date: 10/12/1988    Smokeless tobacco: Never   Substance and Sexual Activity    Alcohol use: Yes     Alcohol/week: 8.0  standard drinks of alcohol     Types: 8 Cans of beer per week     Comment: every weekend - a couple beers per weekend night    Drug use: No    Sexual activity: Yes     Partners: Female     Social Drivers of Health     Financial Resource Strain: Low Risk  (7/2/2024)    Overall Financial Resource Strain (CARDIA)     Difficulty of Paying Living Expenses: Not hard at all   Food Insecurity: No Food Insecurity (7/2/2024)    Hunger Vital Sign     Worried About Running Out of Food in the Last Year: Never true     Ran Out of Food in the Last Year: Never true   Transportation Needs: No Transportation Needs (8/21/2023)    PRAPARE - Transportation     Lack of Transportation (Medical): No     Lack of Transportation (Non-Medical): No   Physical Activity: Insufficiently Active (7/2/2024)    Exercise Vital Sign     Days of Exercise per Week: 3 days     Minutes of Exercise per Session: 30 min   Stress: No Stress Concern Present (7/2/2024)    [employer] - Occupational Stress Questionnaire     Feeling of Stress : Not at all   Housing Stability: Low Risk  (8/21/2023)    Housing Stability Vital Sign     Unable to Pay for Housing in the Last Year: No     Number of Places Lived in the Last Year: 1     Unstable Housing in the Last Year: No

## 2025-06-28 DIAGNOSIS — I10 PRIMARY HYPERTENSION: ICD-10-CM

## 2025-07-01 RX ORDER — FUROSEMIDE 20 MG/1
20 TABLET ORAL DAILY
Qty: 90 TABLET | Refills: 0 | Status: SHIPPED | OUTPATIENT
Start: 2025-07-01 | End: 2026-07-01

## 2025-08-01 ENCOUNTER — OFFICE VISIT (OUTPATIENT)
Dept: FAMILY MEDICINE | Facility: CLINIC | Age: 60
End: 2025-08-01
Payer: COMMERCIAL

## 2025-08-01 VITALS
BODY MASS INDEX: 39.79 KG/M2 | HEIGHT: 69 IN | DIASTOLIC BLOOD PRESSURE: 86 MMHG | SYSTOLIC BLOOD PRESSURE: 126 MMHG | WEIGHT: 268.63 LBS | OXYGEN SATURATION: 95 % | HEART RATE: 68 BPM | TEMPERATURE: 99 F

## 2025-08-01 DIAGNOSIS — E66.812 CLASS 2 SEVERE OBESITY DUE TO EXCESS CALORIES WITH SERIOUS COMORBIDITY AND BODY MASS INDEX (BMI) OF 39.0 TO 39.9 IN ADULT: ICD-10-CM

## 2025-08-01 DIAGNOSIS — E79.0 ELEVATED URIC ACID IN BLOOD: ICD-10-CM

## 2025-08-01 DIAGNOSIS — K21.9 GASTROESOPHAGEAL REFLUX DISEASE, UNSPECIFIED WHETHER ESOPHAGITIS PRESENT: ICD-10-CM

## 2025-08-01 DIAGNOSIS — Z86.0100 HISTORY OF COLON POLYPS: ICD-10-CM

## 2025-08-01 DIAGNOSIS — E78.2 MIXED HYPERLIPIDEMIA: ICD-10-CM

## 2025-08-01 DIAGNOSIS — J43.2 CENTRILOBULAR EMPHYSEMA: Primary | ICD-10-CM

## 2025-08-01 DIAGNOSIS — Z13.0 SCREENING FOR DEFICIENCY ANEMIA: ICD-10-CM

## 2025-08-01 DIAGNOSIS — F90.0 ADHD (ATTENTION DEFICIT HYPERACTIVITY DISORDER), INATTENTIVE TYPE: ICD-10-CM

## 2025-08-01 DIAGNOSIS — Z13.29 THYROID DISORDER SCREEN: ICD-10-CM

## 2025-08-01 DIAGNOSIS — Z13.1 DIABETES MELLITUS SCREENING: ICD-10-CM

## 2025-08-01 DIAGNOSIS — F17.219 CIGARETTE NICOTINE DEPENDENCE WITH NICOTINE-INDUCED DISORDER: ICD-10-CM

## 2025-08-01 DIAGNOSIS — I10 PRIMARY HYPERTENSION: ICD-10-CM

## 2025-08-01 DIAGNOSIS — Z87.39 PERSONAL HISTORY OF GOUT: ICD-10-CM

## 2025-08-01 DIAGNOSIS — Z12.5 SCREENING PSA (PROSTATE SPECIFIC ANTIGEN): ICD-10-CM

## 2025-08-01 DIAGNOSIS — I70.0 AORTIC ATHEROSCLEROSIS: ICD-10-CM

## 2025-08-01 DIAGNOSIS — E66.01 CLASS 2 SEVERE OBESITY DUE TO EXCESS CALORIES WITH SERIOUS COMORBIDITY AND BODY MASS INDEX (BMI) OF 39.0 TO 39.9 IN ADULT: ICD-10-CM

## 2025-08-01 DIAGNOSIS — Z00.00 ENCOUNTER FOR BLOOD TEST FOR ROUTINE GENERAL PHYSICAL EXAMINATION: ICD-10-CM

## 2025-08-01 PROBLEM — F17.210 CIGARETTE NICOTINE DEPENDENCE WITHOUT COMPLICATION: Status: ACTIVE | Noted: 2017-07-30

## 2025-08-01 PROBLEM — M10.9 ACUTE GOUT OF RIGHT FOOT: Status: RESOLVED | Noted: 2025-04-14 | Resolved: 2025-08-01

## 2025-08-01 PROCEDURE — 99999 PR PBB SHADOW E&M-EST. PATIENT-LVL III: CPT | Mod: PBBFAC,,, | Performed by: NURSE PRACTITIONER

## 2025-08-01 RX ORDER — ALLOPURINOL 100 MG/1
TABLET ORAL
Qty: 180 TABLET | Refills: 0 | Status: SHIPPED | OUTPATIENT
Start: 2025-08-01 | End: 2025-11-06

## 2025-08-01 RX ORDER — DEXTROAMPHETAMINE SULFATE, DEXTROAMPHETAMINE SACCHARATE, AMPHETAMINE SULFATE AND AMPHETAMINE ASPARTATE 7.5; 7.5; 7.5; 7.5 MG/1; MG/1; MG/1; MG/1
60 CAPSULE, EXTENDED RELEASE ORAL EVERY MORNING
Qty: 180 CAPSULE | Refills: 0 | Status: SHIPPED | OUTPATIENT
Start: 2025-08-01

## 2025-08-01 RX ORDER — FUROSEMIDE 20 MG/1
20 TABLET ORAL DAILY
Qty: 90 TABLET | Refills: 2 | Status: SHIPPED | OUTPATIENT
Start: 2025-08-01 | End: 2026-08-01

## 2025-08-01 NOTE — PROGRESS NOTES
Subjective:       Patient ID: Nick Mcdonough Sr. is a 59 y.o. male.    Chief Complaint: Follow-up (3 months F/U)        HPI WITH ASSESSMENT AND PLAN OF CARE:      Patient is a 59-year-old white male with Emphysema, Aortic Atherosclerosis, Hypertension, Hyperlipidemia, chronic GERD, personal history of colon polyps, ADHD, tobacco user, fluid retention to lower extremities with venous stasis dermatitis, elevated uric acid with personal history of gout and obesity that is here today for follow up with fasting lab results.       Centrilobular and Paraseptal Emphysema  4/25/2025 CT LUNG:  Lungs: There are mild centrilobular and paraseptal emphysematous changes within the lungs. There are dependent atelectatic changes within the lung bases with elevation of the right hemidiaphragm. No suspicious pulmonary nodules are identified.   ADVISED TO STOP SMOKING  Repeat CT in April 2026  Asymptomatic      Current smoker   for over 30 years - 1ppd smoker.    States he started smoking around age 23.    He declines the smoking cessation program.    CT lung screen 4/22/2024:  FINDINGS: Exam is negative. No significant pulmonary nodule identified.   4/25/2025 CT LUNG:  Lungs: There are mild centrilobular and paraseptal emphysematous changes within the lungs. There are dependent atelectatic changes within the lung bases with elevation of the right hemidiaphragm. No suspicious pulmonary nodules are identified.   ADVISED TO STOP SMOKING       Aortic Atherosclerosis  Seen on CT lung screening 4/2024  Taking ASA 81 mg daily  Already on Statin therapy with LDL 58  Stable.  Recheck levels in October             Hypertension  Currently taking Lisinopril 40 mg daily, HCT 25 mg daily, Doxazosin 8 mg daily and Amlodipine 10 mg daily.   STOPPED the MAXZIDE 25 mg daily and change to HCTZ 25 mg daily on 10/2/24 due to elevated sodium levels.  Sodium level is improved but potassium level remained low   Start K-Dur 20 meq daily supplement  11/21/24  INCREASED K-Dur to 40 meq daily 1/16/2025  Patient has since had 2 episodes of gout with elevation uric acid levels.  STOP the HCTZ 25 mg daily 4/14/25 and CHANGE to LASIX 20 mg daily  Continue the K-dur 20 meq BID  7/19/2025 lab: K+ improved with supplement, sodium slightly high 146 - will monitor - recheck in 3 months.                History of Elevated Serum Sodium levels  Sodium 147 in October 2024  STOPPED the MAXZIDE 25 mg daily and changed to HCTZ 25 mg daily  Decreased sodium in diet  11/21/24 - sodium level improved.  1/14/2025 - remains stable.  4/14/2025: STOP the HCTZ 25 mg daily 4/14/25 and CHANGE to LASIX 20 mg daily  7/19/2025 - level 146; decrease sodium in diet and monitor - may need to adjust BP meds if persists            LOW POTASSIUM  STOPPED the MAXZIDE 25 mg daily and change to HCTZ 25 mg daily on 10/2/24 due to elevated sodium levels.  Potassium level ilow  11/21/24: start K-Dur 20 meq daily supplement  1/14/2025:  Increase K-Dur 40 meq daily supplement  4/14/2025: STOP the HCTZ 25 mg daily 4/14/25 and CHANGE to LASIX 20 mg daily  7/19/2025: K+ stable on current supplement             Hyperlipidemia  Intolerance to Rosuvastatin due to acute itching  On Pravastatin 2019 - June 2020 - triglycerides too high  Change to Atorvastatin June 2020 and levels in October 2020 much improved.  Change to Zetia with LDL uncontrolled with elevated liver enzymes 2022/2023  CHANGE BACK TO ATORVASTATIN 10 mg daily in October 2023  LDL 58  Stable  Recheck in October 2025             OBESITY  Body mass index is 39.67 kg/m².  Working on lifestyle modifications            ADHD   stable on current regimen Adderall XR 30 mg 2 capsules daily.    Patient is able to focus and complete tasks effectively.  No side effects reported.   Patient has intolerance to GENERIC Adderall XR in the past due to ineffectiveness and crash effect but has been taking the BRAND Adderall XR 30 mg 2 capsule daily since prior to  2017 without issue.  Stable.  Recheck in 3 months.        Chronic GERD  Patient reports on PPI for multiple years  Reports Nexium 40 mg no longer covered - has to change to Omeprazole  Taking Omeprazole 40 mg daily  saw GI for EGD 8/21/23  EGD 11/29/23:  Mild Erythematous Gastropathy  Continue PPI  Stable.        Personal History of Colon Polyps  Last Colonoscopy 11/29/2023 - Dr. Alvarez  + polyps - repeat in 3 years - November 2026                  History of IFG   with HgbA1C 5.6% in September 2023  No history of elevations  FBG 98 and A1C 5.3%   Stable.  Recheck levels in October              History of Elevated liver enzymes  AST 81 and  in September 2023 = no history of elevations since 2018 and before.  No recent changes in medication  No recent illness reported  Levels improved but still elevated in January 2024  Plan to repeat hepatic function, hepatitis panel and liver ultrasound in 3 months   LIVER ENZYMES are now NORMAL in April 2024  Hepatitis panel is negative  Liver ultrasound was NOT done but since labs are okay - we can hold off on imaging.  Repeat labs in October 2024 STABLE  Can now monitor yearly - recheck in October.             ACUTE GOUT FLARE with History of Gout and ELEVATED Uric Acid Levels  1st diagnosed with gout at Urgent Care on 12/3/2024 - treated with medrol dose pack  Uric Acid level was done - high at 9  4/14/2025 visit:  Reports started yesterday with pain and swelling to top of right foot - see picture belwo  + swelling to right foot and toes present  Good pedal pulses, cap refill < 2 sec, skin temp normal  Will treat with MEDROL DOSE PACK.  Prescribed colchicine for future gout flare-ups  Will get uric acid level in 3 months while NOT in gout flare to get baseline level.  8/1/2025 visit:  Uric acid level 7.8 high  Start Allopurinol 100 mg and titrate to 200 mg daily  Recheck in 3 months.          Lab Visit on 07/19/2025   Component Date Value Ref Range Status     "Uric Acid 07/19/2025 7.8 (H)  3.4 - 7.0 mg/dL Final    Sodium 07/19/2025 146 (H)  136 - 145 mmol/L Final    Potassium 07/19/2025 3.9  3.5 - 5.1 mmol/L Final    Chloride 07/19/2025 110  95 - 110 mmol/L Final    CO2 07/19/2025 27  23 - 29 mmol/L Final    Glucose 07/19/2025 94  70 - 110 mg/dL Final    BUN 07/19/2025 16  6 - 20 mg/dL Final    Creatinine 07/19/2025 0.9  0.5 - 1.4 mg/dL Final    Calcium 07/19/2025 9.1  8.7 - 10.5 mg/dL Final    Protein Total 07/19/2025 7.3  6.0 - 8.4 gm/dL Final    Albumin 07/19/2025 3.9  3.5 - 5.2 g/dL Final    Bilirubin Total 07/19/2025 0.6  0.1 - 1.0 mg/dL Final    For infants and newborns, interpretation of results should be based   on gestational age, weight and in agreement with clinical   observations.    Premature Infant recommended reference ranges:   0-24 hours:  <8.0 mg/dL   24-48 hours: <12.0 mg/dL   3-5 days:    <15.0 mg/dL   6-29 days:   <15.0 mg/dL    ALP 07/19/2025 83  40 - 150 unit/L Final    AST 07/19/2025 27  11 - 45 unit/L Final    ALT 07/19/2025 29  10 - 44 unit/L Final    Anion Gap 07/19/2025 9  8 - 16 mmol/L Final    eGFR 07/19/2025 >60  >60 mL/min/1.73/m2 Final    Estimated GFR calculated using the CKD-EPI creatinine (2021) equation.         Vitals:    08/01/25 0823   BP: 126/86   BP Location: Left arm   Patient Position: Sitting   Pulse: 68   Temp: 98.6 °F (37 °C)   TempSrc: Temporal   SpO2: 95%   Weight: 121.9 kg (268 lb 10.1 oz)   Height: 5' 9" (1.753 m)         Diagnoses this Encounter:         ICD-10-CM ICD-9-CM   1. Centrilobular emphysema  J43.2 492.8   2. Cigarette nicotine dependence with nicotine-induced disorder  F17.219 292.9   3. Elevated uric acid in blood  E79.0 790.6   4. Personal history of gout  Z87.39 V12.29   5. Aortic atherosclerosis  I70.0 440.0   6. Mixed hyperlipidemia  E78.2 272.2   7. Primary hypertension  I10 401.9   8. Class 2 severe obesity due to excess calories with serious comorbidity and body mass index (BMI) of 39.0 to 39.9 in " adult  E66.812 278.01    E66.01 V85.39    Z68.39    9. ADHD (attention deficit hyperactivity disorder), inattentive type  F90.0 314.00   10. Gastroesophageal reflux disease, unspecified whether esophagitis present  K21.9 530.81   11. History of colon polyps  Z86.0100 V12.72   12. Encounter for blood test for routine general physical examination  Z00.00 V72.62   13. Screening for deficiency anemia  Z13.0 V78.1   14. Thyroid disorder screen  Z13.29 V77.0   15. Diabetes mellitus screening  Z13.1 V77.1   16. Screening PSA (prostate specific antigen)  Z12.5 V76.44       Orders Placed This Encounter    Uric Acid    CBC Auto Differential    Comprehensive Metabolic Panel    Hemoglobin A1C    Lipid Panel    TSH    PSA, Screening    allopurinoL (ZYLOPRIM) 100 MG tablet    ADDERALL XR 30 mg 24 hr capsule    furosemide (LASIX) 20 MG tablet        Follow up in about 3 months (around 11/1/2025) for fasting labs and wellness exam.     Patient's Medications   New Prescriptions    ALLOPURINOL (ZYLOPRIM) 100 MG TABLET    Take 1 tablet (100 mg total) by mouth once daily for 7 days, THEN 2 tablets (200 mg total) by mouth once daily.   Previous Medications    AMLODIPINE (NORVASC) 10 MG TABLET    Take 1 tablet (10 mg total) by mouth once daily.    ATORVASTATIN (LIPITOR) 10 MG TABLET    Take 1 tablet (10 mg total) by mouth once daily.    COLCHICINE (COLCRYS) 0.6 MG TABLET    Take 2 tablets at onset of gout flare-up and the 1 tablet 1 hour later - max dose 3 tablets/treatment course.  May repeat this in 72 hours if needed.    DOXAZOSIN (CARDURA) 8 MG TAB    Take 1 tablet (8 mg total) by mouth once daily.    FISH OIL-OMEGA-3 FATTY ACIDS 300-1,000 MG CAPSULE    Take 1 g by mouth once daily.    LISINOPRIL (PRINIVIL,ZESTRIL) 40 MG TABLET    Take 1 tablet (40 mg total) by mouth once daily.    NIACIN 500 MG TAB    Take 1 tablet (500 mg total) by mouth every evening.    OMEPRAZOLE (PRILOSEC) 40 MG CAPSULE    Take 1 capsule (40 mg total) by mouth  once daily.    POTASSIUM CHLORIDE SA (K-DUR,KLOR-CON) 20 MEQ TABLET    Take 1 tablet (20 mEq total) by mouth 2 (two) times daily.   Modified Medications    Modified Medication Previous Medication    ADDERALL XR 30 MG 24 HR CAPSULE ADDERALL XR 30 mg 24 hr capsule       Take 2 capsules (60 mg total) by mouth every morning. BRAND ONLY    Take 2 capsules (60 mg total) by mouth every morning. BRAND ONLY    FUROSEMIDE (LASIX) 20 MG TABLET furosemide (LASIX) 20 MG tablet       Take 1 tablet (20 mg total) by mouth once daily.    Take 1 tablet (20 mg total) by mouth once daily.   Discontinued Medications    No medications on file         Review of Systems   HENT: Negative.     Respiratory: Negative.     Cardiovascular: Negative.    Gastrointestinal: Negative.          Objective:        Physical Exam  Constitutional:       General: He is not in acute distress.     Appearance: He is well-developed. He is obese. He is not ill-appearing, toxic-appearing or diaphoretic.      Comments: + obesity. Body mass index is 39.67 kg/m².               HENT:      Head: Normocephalic and atraumatic.      Right Ear: External ear normal.      Left Ear: External ear normal.   Eyes:      General: No scleral icterus.        Right eye: No discharge.         Left eye: No discharge.      Extraocular Movements: Extraocular movements intact.      Conjunctiva/sclera: Conjunctivae normal.   Neck:      Thyroid: No thyromegaly.      Vascular: No JVD.      Trachea: No tracheal deviation.   Cardiovascular:      Rate and Rhythm: Normal rate and regular rhythm.      Heart sounds: Normal heart sounds. No murmur heard.  Pulmonary:      Effort: Pulmonary effort is normal. No respiratory distress.      Breath sounds: Normal breath sounds. No stridor. No wheezing or rhonchi.   Abdominal:      General: There is no distension.   Musculoskeletal:         General: Normal range of motion.      Cervical back: Normal range of motion and neck supple.   Lymphadenopathy:       Cervical: No cervical adenopathy.   Skin:     General: Skin is warm and dry.      Findings: No rash.   Neurological:      Mental Status: He is alert and oriented to person, place, and time.      Coordination: Coordination normal.   Psychiatric:         Mood and Affect: Mood normal.         Behavior: Behavior normal.         Thought Content: Thought content normal.         Judgment: Judgment normal.             Past Medical History:   Diagnosis Date    Adult ADHD     Alcohol dependency 09/2015    last alcohol intake September 2015    Aortic atherosclerosis 05/02/2024    Seen on CT lung screening 4/2024  Advised to start ASA 81 mg daily  Already on Statin therapy and stable.      Centrilobular emphysema 08/01/2025    Colon polyp 02/02/2018    2 polyps - tubular adenoma - repeat colonoscopy in 5 years    GERD (gastroesophageal reflux disease)     Hyperlipidemia     Hypertension     Personal history of gout 08/01/2025       Past Surgical History:   Procedure Laterality Date    COLONOSCOPY N/A 2/2/2018    Procedure: COLONOSCOPY;  Surgeon: Eduard Medley MD;  Location: [facility];  Service: Endoscopy;  Laterality: N/A;    COLONOSCOPY N/A 11/29/2023    Procedure: COLONOSCOPY;  Surgeon: Jace Prince MD;  Location: [facility] ([address] [place] [address]);  Service: Endoscopy;  Laterality: N/A;  Dr. Ren refer, pt requesting this date, ext/constipation prepMiralax instru via portal-  2nd flr-airway, >BMI possible sleep apnea per Dr. Ren  11/22-precall complete-MS    ESOPHAGOGASTRODUODENOSCOPY N/A 11/29/2023    Procedure: EGD (ESOPHAGOGASTRODUODENOSCOPY);  Surgeon: Jace Prince MD;  Location: [facility] ([address] [place] [address]);  Service: Endoscopy;  Laterality: N/A;    HERNIA REPAIR      INGUINAL HERNIA REPAIR Left 2012    done at [facility]    KNEE SURGERY Right     VASECTOMY         Family History   Problem Relation Name Age of Onset    Heart disease Mother Gwendolyn         AFIB    Arthritis Mother Gwendolyn     Depression Mother Gwendolyn      Drug abuse Mother Gwendolyn     Hyperlipidemia Mother Gwendolyn     Hypertension Mother Gwendolyn     Mental illness Mother Gwendolyn     Hypertension Father Jose     Heart disease Brother Luiz         acute MI -  at at 46    Alcohol abuse Brother Luiz     Early death Brother Luiz     Mental illness Brother Luiz     Cancer Maternal Grandmother Danielle     No Known Problems Maternal Grandfather      Heart disease Paternal Grandmother Danielle     Alcohol abuse Paternal Grandfather Jose        Social History     Socioeconomic History    Marital status:    Occupational History    Occupation:    Tobacco Use    Smoking status: Every Day     Current packs/day: 0.50     Average packs/day: 1 pack/day for 36.8 years (36.1 ttl pk-yrs)     Types: Cigarettes     Start date: 10/12/1988    Smokeless tobacco: Never   Substance and Sexual Activity    Alcohol use: Yes     Alcohol/week: 8.0 standard drinks of alcohol     Types: 8 Cans of beer per week     Comment: every weekend - a couple beers per weekend night    Drug use: No    Sexual activity: Yes     Partners: Female     Social Drivers of Health     Financial Resource Strain: Low Risk  (2024)    Overall Financial Resource Strain (CARDIA)     Difficulty of Paying Living Expenses: Not hard at all   Food Insecurity: No Food Insecurity (2024)    Hunger Vital Sign     Worried About Running Out of Food in the Last Year: Never true     Ran Out of Food in the Last Year: Never true   Transportation Needs: No Transportation Needs (2023)    PRAPARE - Transportation     Lack of Transportation (Medical): No     Lack of Transportation (Non-Medical): No   Physical Activity: Insufficiently Active (2024)    Exercise Vital Sign     Days of Exercise per Week: 3 days     Minutes of Exercise per Session: 30 min   Stress: No Stress Concern Present (2024)    [employer] [place] [employer] - Occupational Stress Questionnaire     Feeling of  Stress : Not at all   Housing Stability: Low Risk  (8/21/2023)    Housing Stability Vital Sign     Unable to Pay for Housing in the Last Year: No     Number of Places Lived in the Last Year: 1     Unstable Housing in the Last Year: No